# Patient Record
Sex: MALE | Race: BLACK OR AFRICAN AMERICAN | NOT HISPANIC OR LATINO | URBAN - METROPOLITAN AREA
[De-identification: names, ages, dates, MRNs, and addresses within clinical notes are randomized per-mention and may not be internally consistent; named-entity substitution may affect disease eponyms.]

---

## 2019-05-16 ENCOUNTER — INPATIENT (INPATIENT)
Facility: HOSPITAL | Age: 79
LOS: 4 days | Discharge: NURSING, REHAB OR LONG-TERM CARE | End: 2019-05-21
Attending: INTERNAL MEDICINE | Admitting: INTERNAL MEDICINE
Payer: MEDICARE

## 2019-05-16 VITALS
SYSTOLIC BLOOD PRESSURE: 180 MMHG | WEIGHT: 190.04 LBS | HEART RATE: 72 BPM | TEMPERATURE: 97 F | RESPIRATION RATE: 16 BRPM | OXYGEN SATURATION: 100 % | DIASTOLIC BLOOD PRESSURE: 90 MMHG | HEIGHT: 71 IN

## 2019-05-16 DIAGNOSIS — N39.0 URINARY TRACT INFECTION, SITE NOT SPECIFIED: ICD-10-CM

## 2019-05-16 DIAGNOSIS — I63.9 CEREBRAL INFARCTION, UNSPECIFIED: ICD-10-CM

## 2019-05-16 LAB
ALBUMIN SERPL ELPH-MCNC: 3.4 G/DL — SIGNIFICANT CHANGE UP (ref 3.3–5)
ALP SERPL-CCNC: 78 U/L — SIGNIFICANT CHANGE UP (ref 40–120)
ALT FLD-CCNC: 13 U/L — SIGNIFICANT CHANGE UP (ref 12–78)
ANION GAP SERPL CALC-SCNC: 10 MMOL/L — SIGNIFICANT CHANGE UP (ref 5–17)
APPEARANCE UR: ABNORMAL
APTT BLD: 31 SEC — SIGNIFICANT CHANGE UP (ref 28.5–37)
AST SERPL-CCNC: 19 U/L — SIGNIFICANT CHANGE UP (ref 15–37)
BACTERIA # UR AUTO: ABNORMAL
BILIRUB SERPL-MCNC: 0.7 MG/DL — SIGNIFICANT CHANGE UP (ref 0.2–1.2)
BILIRUB UR-MCNC: NEGATIVE — SIGNIFICANT CHANGE UP
BUN SERPL-MCNC: 66 MG/DL — HIGH (ref 7–23)
CALCIUM SERPL-MCNC: 8.2 MG/DL — LOW (ref 8.5–10.1)
CHLORIDE SERPL-SCNC: 116 MMOL/L — HIGH (ref 96–108)
CK MB BLD-MCNC: 2.9 % — SIGNIFICANT CHANGE UP (ref 0–3.5)
CK MB CFR SERPL CALC: 8 NG/ML — HIGH (ref 0.5–3.6)
CK SERPL-CCNC: 272 U/L — SIGNIFICANT CHANGE UP (ref 26–308)
CO2 SERPL-SCNC: 21 MMOL/L — LOW (ref 22–31)
COLOR SPEC: YELLOW — SIGNIFICANT CHANGE UP
CREAT SERPL-MCNC: 5.54 MG/DL — HIGH (ref 0.5–1.3)
DIFF PNL FLD: ABNORMAL
EPI CELLS # UR: NEGATIVE — SIGNIFICANT CHANGE UP
GLUCOSE BLDC GLUCOMTR-MCNC: 90 MG/DL — SIGNIFICANT CHANGE UP (ref 70–99)
GLUCOSE SERPL-MCNC: 102 MG/DL — HIGH (ref 70–99)
GLUCOSE UR QL: NEGATIVE MG/DL — SIGNIFICANT CHANGE UP
HCT VFR BLD CALC: 34.5 % — LOW (ref 39–50)
HGB BLD-MCNC: 10.7 G/DL — LOW (ref 13–17)
INR BLD: 1.28 RATIO — HIGH (ref 0.88–1.16)
KETONES UR-MCNC: NEGATIVE — SIGNIFICANT CHANGE UP
LACTATE SERPL-SCNC: 0.8 MMOL/L — SIGNIFICANT CHANGE UP (ref 0.7–2)
LEUKOCYTE ESTERASE UR-ACNC: ABNORMAL
MCHC RBC-ENTMCNC: 31 GM/DL — LOW (ref 32–36)
MCHC RBC-ENTMCNC: 31.2 PG — SIGNIFICANT CHANGE UP (ref 27–34)
MCV RBC AUTO: 100.6 FL — HIGH (ref 80–100)
NITRITE UR-MCNC: POSITIVE
NRBC # BLD: 0 /100 WBCS — SIGNIFICANT CHANGE UP (ref 0–0)
PH UR: 6 — SIGNIFICANT CHANGE UP (ref 5–8)
PLATELET # BLD AUTO: 140 K/UL — LOW (ref 150–400)
POTASSIUM SERPL-MCNC: 4.7 MMOL/L — SIGNIFICANT CHANGE UP (ref 3.5–5.3)
POTASSIUM SERPL-SCNC: 4.7 MMOL/L — SIGNIFICANT CHANGE UP (ref 3.5–5.3)
PROT SERPL-MCNC: 7.3 GM/DL — SIGNIFICANT CHANGE UP (ref 6–8.3)
PROT UR-MCNC: 500 MG/DL
PROTHROM AB SERPL-ACNC: 14.4 SEC — HIGH (ref 10–12.9)
RBC # BLD: 3.43 M/UL — LOW (ref 4.2–5.8)
RBC # FLD: 14 % — SIGNIFICANT CHANGE UP (ref 10.3–14.5)
RBC CASTS # UR COMP ASSIST: ABNORMAL /HPF (ref 0–4)
SODIUM SERPL-SCNC: 147 MMOL/L — HIGH (ref 135–145)
SP GR SPEC: 1.01 — SIGNIFICANT CHANGE UP (ref 1.01–1.02)
TROPONIN I SERPL-MCNC: 0.12 NG/ML — HIGH (ref 0.01–0.04)
UROBILINOGEN FLD QL: NEGATIVE MG/DL — SIGNIFICANT CHANGE UP
WBC # BLD: 5.31 K/UL — SIGNIFICANT CHANGE UP (ref 3.8–10.5)
WBC # FLD AUTO: 5.31 K/UL — SIGNIFICANT CHANGE UP (ref 3.8–10.5)
WBC UR QL: >50

## 2019-05-16 PROCEDURE — 70450 CT HEAD/BRAIN W/O DYE: CPT | Mod: 26

## 2019-05-16 PROCEDURE — 99291 CRITICAL CARE FIRST HOUR: CPT

## 2019-05-16 PROCEDURE — 93010 ELECTROCARDIOGRAM REPORT: CPT

## 2019-05-16 PROCEDURE — 71045 X-RAY EXAM CHEST 1 VIEW: CPT | Mod: 26

## 2019-05-16 PROCEDURE — 70544 MR ANGIOGRAPHY HEAD W/O DYE: CPT | Mod: 26,59

## 2019-05-16 PROCEDURE — 70547 MR ANGIOGRAPHY NECK W/O DYE: CPT | Mod: 26

## 2019-05-16 PROCEDURE — 70551 MRI BRAIN STEM W/O DYE: CPT | Mod: 26

## 2019-05-16 RX ORDER — CHLORHEXIDINE GLUCONATE 213 G/1000ML
1 SOLUTION TOPICAL
Refills: 0 | Status: DISCONTINUED | OUTPATIENT
Start: 2019-05-16 | End: 2019-05-17

## 2019-05-16 RX ORDER — NICARDIPINE HYDROCHLORIDE 30 MG/1
5 CAPSULE, EXTENDED RELEASE ORAL
Qty: 40 | Refills: 0 | Status: DISCONTINUED | OUTPATIENT
Start: 2019-05-16 | End: 2019-05-17

## 2019-05-16 RX ORDER — ALTEPLASE 100 MG
8 KIT INTRAVENOUS ONCE
Refills: 0 | Status: COMPLETED | OUTPATIENT
Start: 2019-05-16 | End: 2019-05-16

## 2019-05-16 RX ORDER — CEFTRIAXONE 500 MG/1
1 INJECTION, POWDER, FOR SOLUTION INTRAMUSCULAR; INTRAVENOUS ONCE
Refills: 0 | Status: COMPLETED | OUTPATIENT
Start: 2019-05-16 | End: 2019-05-16

## 2019-05-16 RX ORDER — ALTEPLASE 100 MG
70 KIT INTRAVENOUS ONCE
Refills: 0 | Status: COMPLETED | OUTPATIENT
Start: 2019-05-16 | End: 2019-05-16

## 2019-05-16 RX ORDER — CEFTRIAXONE 500 MG/1
1 INJECTION, POWDER, FOR SOLUTION INTRAMUSCULAR; INTRAVENOUS EVERY 24 HOURS
Refills: 0 | Status: DISCONTINUED | OUTPATIENT
Start: 2019-05-17 | End: 2019-05-21

## 2019-05-16 RX ADMIN — ALTEPLASE 480 MILLIGRAM(S): KIT at 13:58

## 2019-05-16 RX ADMIN — ALTEPLASE 70 MILLIGRAM(S): KIT at 13:58

## 2019-05-16 RX ADMIN — ALTEPLASE 70 MILLIGRAM(S): KIT at 15:15

## 2019-05-16 RX ADMIN — ALTEPLASE 8 MILLIGRAM(S): KIT at 14:24

## 2019-05-16 RX ADMIN — CHLORHEXIDINE GLUCONATE 1 APPLICATION(S): 213 SOLUTION TOPICAL at 19:30

## 2019-05-16 RX ADMIN — NICARDIPINE HYDROCHLORIDE 25 MG/HR: 30 CAPSULE, EXTENDED RELEASE ORAL at 13:39

## 2019-05-16 RX ADMIN — CEFTRIAXONE 100 GRAM(S): 500 INJECTION, POWDER, FOR SOLUTION INTRAMUSCULAR; INTRAVENOUS at 18:23

## 2019-05-16 NOTE — ED ADULT NURSE REASSESSMENT NOTE - NS ED NURSE REASSESS COMMENT FT1
tele stroke completed, as per md wylie, and neurologist, pt BP to be 18/110 before TPA can begin. Cardene drip to be initiated, awaiting pharmacy verfication.

## 2019-05-16 NOTE — ED PROVIDER NOTE - CRITICAL CARE PROVIDED
direct patient care (not related to procedure)/consult w/ pt's family directly relating to pts condition/additional history taking/documentation

## 2019-05-16 NOTE — H&P ADULT - NSHPLABSRESULTS_GEN_ALL_CORE
CBC Full  -  ( 16 May 2019 13:16 )  WBC Count : 5.31 K/uL  RBC Count : 3.43 M/uL  Hemoglobin : 10.7 g/dL  Hematocrit : 34.5 %  Platelet Count - Automated : 140 K/uL  Mean Cell Volume : 100.6 fl  Mean Cell Hemoglobin : 31.2 pg  Mean Cell Hemoglobin Concentration : 31.0 gm/dL  Auto Neutrophil # : x  Auto Lymphocyte # : x  Auto Monocyte # : x  Auto Eosinophil # : x  Auto Basophil # : x  Auto Neutrophil % : x  Auto Lymphocyte % : x  Auto Monocyte % : x  Auto Eosinophil % : x  Auto Basophil % : x          147<H>  |  116<H>  |  66<H>  ----------------------------<  102<H>  4.7   |  21<L>  |  5.54<H>    Ca    8.2<L>      16 May 2019 13:16    TPro  7.3  /  Alb  3.4  /  TBili  0.7  /  DBili  x   /  AST  19  /  ALT  13  /  AlkPhos  78  05-16    LIVER FUNCTIONS - ( 16 May 2019 13:16 )  Alb: 3.4 g/dL / Pro: 7.3 gm/dL / ALK PHOS: 78 U/L / ALT: 13 U/L / AST: 19 U/L / GGT: x           CAPILLARY BLOOD GLUCOSE  90 (16 May 2019 12:53)      POCT Blood Glucose.: 90 mg/dL (16 May 2019 12:41)    PT/INR - ( 16 May 2019 13:16 )   PT: 14.4 sec;   INR: 1.28 ratio    PTT - ( 16 May 2019 13:16 )  PTT:31.0 sec  Urinalysis Basic - ( 16 May 2019 15:42 )    Color: Yellow / Appearance: Slightly Turbid / S.010 / pH: x  Gluc: x / Ketone: Negative  / Bili: Negative / Urobili: Negative mg/dL   Blood: x / Protein: 500 mg/dL / Nitrite: Positive   Leuk Esterase: Moderate / RBC: 3-5 /HPF / WBC >50   Sq Epi: x / Non Sq Epi: Negative / Bacteria: Few    < from: CT Head No Cont (19 @ 13:01) >    IMPRESSION:  Age-indeterminate infarct within the left parieto-occipital lobe. Dense   cortical vessel anteriorly which may be thrombosed. Further evaluation   with MRI is recommended.  No intracranial hemorrhage, mass, or midline shift.  < end of copied text >  < from: MR Angio Head No Cont (19 @ 16:56) >  IMPRESSION: Moderate stenosis involving the right PCA P3 segment.   Moderate stenosis involving the distal left vertebral artery intracranial   V4 segment. No large vessel occlusion.  < end of copied text >  < from: MR Head No Cont (19 @ 16:36) >    IMPRESSION:    Small completed subacute left-sided infarctas above. Trace petechial   hemorrhage versus developing laminar necrosis    < end of copied text >

## 2019-05-16 NOTE — ED ADULT NURSE NOTE - CHIEF COMPLAINT QUOTE
Per EMS pt was in [employer] with spouse and stated not feeling well about 12 felt weak brought down to floor no head injury, since then pt is aphasic and not following commands

## 2019-05-16 NOTE — ED PROVIDER NOTE - OBJECTIVE STATEMENT
79 year old male presents today [facility] from [employer] accompanied with his wife who reports that he was in his normal state of health until 12pm, pt only had complaints of fatigue the last two days, while at [employer] pt was hold some walker when his wife noticed he did not appear to be himself, he was staring and not responding to questions, he then appeared to become very weak and assisted to the floor, EMS found him hypertensive and in atrial fibrillation

## 2019-05-16 NOTE — H&P ADULT - PROBLEM SELECTOR PLAN 1
admit to icu as d/w intensivist, neuro checks q1 hour per protocol,   -follow up CT after 24 hours or if neurological deterioration STAT CT,  - MRI/MRA  done  check lipid panel - start statin,   -check A1C, PT, OT, Speech and swallow evaluation,   - start dvt prophylaxis after 24 hours if no bleed, start aspirin if no bleed, 2decho r/o thrombus, carotid doppler r/o stenosis,   Neurology consult ed Dr. Patricio to see patient

## 2019-05-16 NOTE — ED PROVIDER NOTE - CARE PLAN
Principal Discharge DX:	Aphasia Principal Discharge DX:	Aphasia  Secondary Diagnosis:	CVA (cerebral vascular accident)

## 2019-05-16 NOTE — H&P ADULT - HISTORY OF PRESENT ILLNESS
79 years old man was reported to be last known well and around NOON when he was shopping with his wife at [facility]. He was reported to have a normal conversation with his wife at that time. Soon after that, he was reported to have language disturbance in the form of inability to answer any questions or follow any commands. He was brought to ER  further evaluation. Family denies that he had  any episodes of focal neurological symptoms in the past even transient.

## 2019-05-16 NOTE — STROKE CODE NOTE - ASSESSMENT/PLAN
79 years old man with vascular risk factors of age and HTN is evaluated at OSH for an acute onset of language disturbance. He was reported to be last known well and around NOON when he had a normal conversation with his wife. Soon after that, he was noted to have language disturbance in the form of aphasia, prompting his presentation to OSH. Examination through telestroke shows severe mixed aphasia (expressive>receptive), inability to follow some simple commands respecting the midline and disorientation to his age/months. CT brain to my eye showed age indeterminate left MCA distribution infarct (chronic to my eye). Of note he was diagnosed with atrial fibrillation upon presentation to OSH.    Impression:  Cerebral embolism with cerebral infarction. Probable left MCA distribution stroke - likely etiology being cardioembolism in the setting of newly diagnosed atrial fibrillation but possibility of large vessel disease needs to be ruled out

## 2019-05-16 NOTE — STROKE CODE NOTE - NSICDXPROBLEM_GEN_ALL_CORE_FT
Plan:   - Risks, benefits and adverse reactions associated with IV tPA including hemorrhagic stroke were discussed with patient and available family member in detail. After ruling out contraindications and obtaining verbal consent, patient would be treated with IV tPA in the ED  - Cont with IV tPA precautions including BP goal<185/110 mmHg before the bolus and <180/105 mmHg for first 24 hours after bolus followed by gradual normotension  - CTA head and neck to evaluate for candidacy for mechanical embolectomy   - Aspirin and pharmacological DVT prophylaxis to be considered 24 hours after the IV tPA and repeat brain imaging. SCDs for DVT prophylaxis in the interim   - Atorvastatin 80 mg after establishing enteral access or passing swallow evaluation  - TTE with bubble study and telemetry to screen for possible cardiac source of embolism  - LDL and HbA1C, continue with aggressive vascular risk factors modifications   - PT/OT/Speech and swallow evaluation   - MRI brain without contrast     Above mentioned plan was discussed with patient, available family member and [facility] ED MD in detail. All the questions were answered and concerns were addressed.

## 2019-05-16 NOTE — ED ADULT NURSE NOTE - OBJECTIVE STATEMENT
Per [facility] EMS pt was in [employer] with spouse. Sudden onset weakness, pt was eased onto the florr, pt aphasic as per wife unable to speak. pt pmh HTN, no pcp or home medications. Last seen normal 12:00.

## 2019-05-16 NOTE — H&P ADULT - NSHPPHYSICALEXAM_GEN_ALL_CORE
PHYSICAL EXAM:  GENERAL: NAD, well-developed, well nourished  HEAD:  NC/AC  EYES: EOMI, PERRLA, conjunctiva and sclera clear  NECK: Supple, No JVD  CHEST/LUNG: CTAB. No cough, wheeze, rales.   HEART: Regular rate and rhythm. No murmurs, rubs, or gallops.   ABDOMEN: Soft, Nontender, Nondistended. Bowel sounds present  EXTREMITIES:  2+ Peripheral Pulses, No clubbing, cyanosis, or edema  MS: awake and alert, oriented to self and time, with expressive difficulty  NEUROLOGY: non-focal

## 2019-05-16 NOTE — H&P ADULT - ATTENDING COMMENTS
I have seen and examined the patient. I agree with the above history, physical exam, and plan of care except for as detailed below.    78 y/o M admitted for acute ischemic stroke received tPA in ED.    - Admit to ICU for post tPA monitoring  - Appreciate neuro recs  - q1hr neuro checks  - Repeat head CT at 24 hrs or sooner if change in neuro exam    Attending critical care time 35 minutes

## 2019-05-16 NOTE — ED PROVIDER NOTE - PROGRESS NOTE DETAILS
joe called, dr anna informed teleneurology called, dr álvarez is aware of the patient, no change in patients mental status pt was evaluated by dr álvarez, tpa ordered, oral consent given by the family after risks and benefits were told by dr álvarez, pt currently 213/90, cardene ordered icu called for the admission pt is speaking

## 2019-05-16 NOTE — H&P ADULT - ASSESSMENT
78 y/o male admitted with cva sp tpa ,with aphasia and uti, 78 y/o male admitted with cva sp tpa ,with aphasia and uti,   admit to critical care

## 2019-05-16 NOTE — STROKE CODE NOTE - SUBJECTIVE
79 years old man was reported to be last known well and around NOON when he was shopping with his wife at [facility]. He was reported to have a normal conversation with his wife at that time. Soon after that, he was reported to have language disturbance in the form of inability to answer any questions or follow any commands. He was brought to OSH for further evaluation. Of note, he is not reported to have any episodes of focal neurological symptoms in the past even transiently.

## 2019-05-16 NOTE — ED ADULT NURSE NOTE - NSIMPLEMENTINTERV_GEN_ALL_ED
Implemented All Fall with Harm Risk Interventions:  [place] to call system. Call bell, personal items and telephone within reach. Instruct patient to call for assistance. Room bathroom lighting operational. Non-slip footwear when patient is off stretcher. Physically safe environment: no spills, clutter or unnecessary equipment. Stretcher in lowest position, wheels locked, appropriate side rails in place. Provide visual cue, wrist band, yellow gown, etc. Monitor gait and stability. Monitor for mental status changes and reorient to person, place, and time. Review medications for side effects contributing to fall risk. Reinforce activity limits and safety measures with patient and family. Provide visual clues: red socks.

## 2019-05-17 LAB
ANION GAP SERPL CALC-SCNC: 10 MMOL/L — SIGNIFICANT CHANGE UP (ref 5–17)
APTT BLD: 29.6 SEC — SIGNIFICANT CHANGE UP (ref 28.5–37)
BUN SERPL-MCNC: 64 MG/DL — HIGH (ref 7–23)
CALCIUM SERPL-MCNC: 8.7 MG/DL — SIGNIFICANT CHANGE UP (ref 8.5–10.1)
CHLORIDE SERPL-SCNC: 115 MMOL/L — HIGH (ref 96–108)
CHOLEST SERPL-MCNC: 146 MG/DL — SIGNIFICANT CHANGE UP (ref 10–199)
CK MB BLD-MCNC: 2.2 % — SIGNIFICANT CHANGE UP (ref 0–3.5)
CK MB CFR SERPL CALC: 7.9 NG/ML — HIGH (ref 0.5–3.6)
CK SERPL-CCNC: 364 U/L — HIGH (ref 26–308)
CO2 SERPL-SCNC: 20 MMOL/L — LOW (ref 22–31)
CREAT SERPL-MCNC: 5.04 MG/DL — HIGH (ref 0.5–1.3)
CULTURE RESULTS: SIGNIFICANT CHANGE UP
GLUCOSE SERPL-MCNC: 85 MG/DL — SIGNIFICANT CHANGE UP (ref 70–99)
HBA1C BLD-MCNC: 5 % — SIGNIFICANT CHANGE UP (ref 4–5.6)
HCT VFR BLD CALC: 34.2 % — LOW (ref 39–50)
HDLC SERPL-MCNC: 47 MG/DL — SIGNIFICANT CHANGE UP
HGB BLD-MCNC: 10.5 G/DL — LOW (ref 13–17)
INR BLD: 1.31 RATIO — HIGH (ref 0.88–1.16)
LIPID PNL WITH DIRECT LDL SERPL: 91 MG/DL — SIGNIFICANT CHANGE UP
MAGNESIUM SERPL-MCNC: 2.5 MG/DL — SIGNIFICANT CHANGE UP (ref 1.6–2.6)
MCHC RBC-ENTMCNC: 30.7 GM/DL — LOW (ref 32–36)
MCHC RBC-ENTMCNC: 30.8 PG — SIGNIFICANT CHANGE UP (ref 27–34)
MCV RBC AUTO: 100.3 FL — HIGH (ref 80–100)
NRBC # BLD: 0 /100 WBCS — SIGNIFICANT CHANGE UP (ref 0–0)
PHOSPHATE SERPL-MCNC: 4.6 MG/DL — HIGH (ref 2.5–4.5)
PLATELET # BLD AUTO: 138 K/UL — LOW (ref 150–400)
POTASSIUM SERPL-MCNC: 4.3 MMOL/L — SIGNIFICANT CHANGE UP (ref 3.5–5.3)
POTASSIUM SERPL-SCNC: 4.3 MMOL/L — SIGNIFICANT CHANGE UP (ref 3.5–5.3)
PROTHROM AB SERPL-ACNC: 14.8 SEC — HIGH (ref 10–12.9)
RBC # BLD: 3.41 M/UL — LOW (ref 4.2–5.8)
RBC # FLD: 14 % — SIGNIFICANT CHANGE UP (ref 10.3–14.5)
SODIUM SERPL-SCNC: 145 MMOL/L — SIGNIFICANT CHANGE UP (ref 135–145)
SPECIMEN SOURCE: SIGNIFICANT CHANGE UP
TOTAL CHOLESTEROL/HDL RATIO MEASUREMENT: 3.1 RATIO — LOW (ref 3.4–9.6)
TRIGL SERPL-MCNC: 41 MG/DL — SIGNIFICANT CHANGE UP (ref 10–149)
TROPONIN I SERPL-MCNC: 0.17 NG/ML — HIGH (ref 0.01–0.04)
WBC # BLD: 5.38 K/UL — SIGNIFICANT CHANGE UP (ref 3.8–10.5)
WBC # FLD AUTO: 5.38 K/UL — SIGNIFICANT CHANGE UP (ref 3.8–10.5)

## 2019-05-17 PROCEDURE — 70450 CT HEAD/BRAIN W/O DYE: CPT | Mod: 26

## 2019-05-17 PROCEDURE — 93306 TTE W/DOPPLER COMPLETE: CPT | Mod: 26

## 2019-05-17 PROCEDURE — 99233 SBSQ HOSP IP/OBS HIGH 50: CPT

## 2019-05-17 PROCEDURE — 76770 US EXAM ABDO BACK WALL COMP: CPT | Mod: 26

## 2019-05-17 RX ORDER — ASPIRIN/CALCIUM CARB/MAGNESIUM 324 MG
325 TABLET ORAL DAILY
Refills: 0 | Status: DISCONTINUED | OUTPATIENT
Start: 2019-05-17 | End: 2019-05-21

## 2019-05-17 RX ORDER — METOPROLOL TARTRATE 50 MG
12.5 TABLET ORAL
Refills: 0 | Status: DISCONTINUED | OUTPATIENT
Start: 2019-05-17 | End: 2019-05-18

## 2019-05-17 RX ORDER — SIMVASTATIN 20 MG/1
40 TABLET, FILM COATED ORAL AT BEDTIME
Refills: 0 | Status: DISCONTINUED | OUTPATIENT
Start: 2019-05-17 | End: 2019-05-17

## 2019-05-17 RX ORDER — AMLODIPINE BESYLATE 2.5 MG/1
10 TABLET ORAL DAILY
Refills: 0 | Status: DISCONTINUED | OUTPATIENT
Start: 2019-05-17 | End: 2019-05-21

## 2019-05-17 RX ORDER — HEPARIN SODIUM 5000 [USP'U]/ML
5000 INJECTION INTRAVENOUS; SUBCUTANEOUS EVERY 12 HOURS
Refills: 0 | Status: DISCONTINUED | OUTPATIENT
Start: 2019-05-17 | End: 2019-05-21

## 2019-05-17 RX ORDER — HYDRALAZINE HCL 50 MG
10 TABLET ORAL EVERY 6 HOURS
Refills: 0 | Status: DISCONTINUED | OUTPATIENT
Start: 2019-05-17 | End: 2019-05-21

## 2019-05-17 RX ORDER — SIMVASTATIN 20 MG/1
20 TABLET, FILM COATED ORAL AT BEDTIME
Refills: 0 | Status: DISCONTINUED | OUTPATIENT
Start: 2019-05-17 | End: 2019-05-21

## 2019-05-17 RX ORDER — METOPROLOL TARTRATE 50 MG
5 TABLET ORAL ONCE
Refills: 0 | Status: COMPLETED | OUTPATIENT
Start: 2019-05-17 | End: 2019-05-17

## 2019-05-17 RX ADMIN — Medication 12.5 MILLIGRAM(S): at 20:05

## 2019-05-17 RX ADMIN — CHLORHEXIDINE GLUCONATE 1 APPLICATION(S): 213 SOLUTION TOPICAL at 11:35

## 2019-05-17 RX ADMIN — SIMVASTATIN 20 MILLIGRAM(S): 20 TABLET, FILM COATED ORAL at 21:56

## 2019-05-17 RX ADMIN — AMLODIPINE BESYLATE 10 MILLIGRAM(S): 2.5 TABLET ORAL at 11:35

## 2019-05-17 RX ADMIN — Medication 10 MILLIGRAM(S): at 16:33

## 2019-05-17 RX ADMIN — Medication 5 MILLIGRAM(S): at 15:09

## 2019-05-17 RX ADMIN — HEPARIN SODIUM 5000 UNIT(S): 5000 INJECTION INTRAVENOUS; SUBCUTANEOUS at 18:48

## 2019-05-17 RX ADMIN — CEFTRIAXONE 100 GRAM(S): 500 INJECTION, POWDER, FOR SOLUTION INTRAMUSCULAR; INTRAVENOUS at 18:48

## 2019-05-17 RX ADMIN — Medication 325 MILLIGRAM(S): at 16:33

## 2019-05-17 RX ADMIN — Medication 10 MILLIGRAM(S): at 09:09

## 2019-05-17 NOTE — OCCUPATIONAL THERAPY INITIAL EVALUATION ADULT - NS ASR FOLLOW COMMAND OT EVAL
50% of the time/able to follow single-step instructions/Object identification 7/13 accuracy./unable to follow multi-step instructions

## 2019-05-17 NOTE — OCCUPATIONAL THERAPY INITIAL EVALUATION ADULT - GENERAL OBSERVATIONS, REHAB EVAL
Supine in bed, NAD. Patient's daughter and spouse at bed side. Pt noted with expressive aphasia, decreased attention, decreased processing & decreased sequencing.

## 2019-05-17 NOTE — DIETITIAN INITIAL EVALUATION ADULT. - PERTINENT LABORATORY DATA
05-17 Na 145   Glu 85   K+ 4.3   Cr 5.04   BUN 64   Phos 4.6   Alb 3.4   PAB n/a   Hgb 10.5   Hct 34..2   HgA1C 5.0 %  Glucose, Serum: 85 mg/dL, 05-17 Chol 146 LDL 91 HDL 47 Trig 41

## 2019-05-17 NOTE — PROGRESS NOTE ADULT - ASSESSMENT
Subjective Complaints:  Historian:             Vital Signs Last 24 Hrs  T(C): 36.6 (17 May 2019 11:31), Max: 36.6 (17 May 2019 08:10)  T(F): 97.8 (17 May 2019 11:31), Max: 97.9 (17 May 2019 08:10)  HR: 64 (17 May 2019 15:03) (56 - 95)  BP: 188/106 (17 May 2019 15:03) (154/84 - 218/108)  BP(mean): 127 (17 May 2019 15:03) (102 - 135)  RR: 18 (17 May 2019 15:03) (13 - 26)  SpO2: 99% (17 May 2019 15:03) (95% - 100%)    GENERAL PHYSICAL EXAM:  General:  Appears stated age, well-groomed, well-nourished, no distress  HEENT:  NC/AT, patent nares w/ pink mucosa, OP clear w/o lesions, PERRL, EOMI, conjunctivae clear, no thyromegaly, nodules, adenopathy, no JVD  Chest:  Full & symmetric excursion, no increased effort, breath sounds clear  Cardiovascular:  Regular rhythm, S1, S2, no murmur/rub/S3/S4, no carotid/femoral/abdominal bruit, radial/pedal pulses 2+, no edema  Abdomen:  Soft, non-tender, non-distended, normoactive bowel sounds, no HSM  Extremities:  Gait & station:   Digits:   Nails:   Joints, Bones, Muscles:   ROM:   Stability:  Skin:  No rash/erythema/ecchymoses/petechiae/wounds/abscess/warm/dry  Musculoskeletal:  Full ROM in all joints w/o swelling/tenderness/effusion        LABS:                        10.5   5.38  )-----------( 138      ( 17 May 2019 03:21 )             34.2     05-17    145  |  115<H>  |  64<H>  ----------------------------<  85  4.3   |  20<L>  |  5.04<H>    Ca    8.7      17 May 2019 03:21  Phos  4.6     05-  Mg     2.5     05-    TPro  7.3  /  Alb  3.4  /  TBili  0.7  /  DBili  x   /  AST  19  /  ALT  13  /  AlkPhos  78  05-16    PT/INR - ( 17 May 2019 03:21 )   PT: 14.8 sec;   INR: 1.31 ratio         PTT - ( 17 May 2019 03:21 )  PTT:29.6 sec  Urinalysis Basic - ( 16 May 2019 15:42 )    Color: Yellow / Appearance: Slightly Turbid / S.010 / pH: x  Gluc: x / Ketone: Negative  / Bili: Negative / Urobili: Negative mg/dL   Blood: x / Protein: 500 mg/dL / Nitrite: Positive   Leuk Esterase: Moderate / RBC: 3-5 /HPF / WBC >50   Sq Epi: x / Non Sq Epi: Negative / Bacteria: Few        RADIOLOGY & ADDITIONAL STUDIES:        Neurology Progress Note:      Mental Status: awaek alert speech fouent  follws  commands       Cranial Nerves: 2 .12 intact      Motor:   arm leg 4/5         Sensory: intact      Cerebellar:  finger to nose intact      Gait: deefgrd       Assesment/Plan: s/p tpa left cva mri armando noted lef tpost parietal infarct no bleeed  for pt will follow

## 2019-05-17 NOTE — PROGRESS NOTE ADULT - ASSESSMENT
Pt is an 80 yo M with h/o HTN who had an acute language disturbance in the form of inability to answer any questions or follow any commands. Pt was brought to ER  and CT Age-indeterminate infarct within the left parieto-occipital lobe. Pt Rx'd with TPA admitted to the ICU. Pt worked up for elevated Cr with renal US and incidentally found to Enlarged, polycystic kidneys. There is a 9.1 cm solid and cystic  in the lower pole of the right kidney concerning for neoplasm   Resp: Supplemental O2 prn  ID: U/A with >50 WBC; Ceftriaxone  CVS: BP control  Heme: May start Asa today/ If A fib persists pt should be AC if there are no contraindications  FEN: Po diet  Renal: Renal evaluation of polycystic kidneys and R kidney abnormality  Neuro: F/u as per Neuro/ Today's repeat CT head noted  Social: May transfer out of ICU

## 2019-05-17 NOTE — PHYSICAL THERAPY INITIAL EVALUATION ADULT - ADDITIONAL COMMENTS
As per care coordination and corroborated with patient and patient's wife at bedside, patient lives in a private house with 4 steps to enter +rail, no steps once inside. Patient owns a rolling walker but does not use it.

## 2019-05-17 NOTE — DIETITIAN INITIAL EVALUATION ADULT. - EST PROTEIN NEEDS2
Ok to refill the topical med the pt wants x 2    Electronically Signed by: Krishna Santamaria MD, 4/22/2019   58.5

## 2019-05-17 NOTE — PHYSICAL THERAPY INITIAL EVALUATION ADULT - STRENGTHENING, PT EVAL
Patient will improve strength in B LE to 5/5 6-8 weeks to improve overall functional mobility including gait, transfers, bed mobility and decrease risk of falls.

## 2019-05-17 NOTE — PHYSICAL THERAPY INITIAL EVALUATION ADULT - FOLLOWS COMMANDS/ANSWERS QUESTIONS, REHAB EVAL
100% of the time/able to follow single-step instructions able to follow single-step instructions/50% of the time

## 2019-05-17 NOTE — SWALLOW BEDSIDE ASSESSMENT ADULT - H & P REVIEW
yes/79 years old man was reported to be last known well and around NOON when he was shopping with his wife at [facility]. He was reported to have a normal conversation with his wife at that time. Soon after that, he was reported to have language disturbance in the form of inability to answer any questions or follow any commands. He was brought to ER  further evaluation. Family denies that he had  any episodes of focal neurological symptoms in the past even transient.

## 2019-05-17 NOTE — SWALLOW BEDSIDE ASSESSMENT ADULT - COMMENTS
MRI head 5/16/2019 IMPRESSION:Small completed subacute left-sided infarct as above. Trace petechial hemorrhage versus developing laminar necrosis    CXR 5/16/2019 IMPRESSION:Cardiomegaly and small bibasilar atelectasis.

## 2019-05-17 NOTE — CONSULT NOTE ADULT - ASSESSMENT
Subjective Complaints:      Consult requested by ER doctor:                  Attending:     History of Present Illness:  Chief Complaint/Reason for Admission:  History of Present Illness:  HPI:  79 years old man was reported to be last known well and around NOON when he was shopping with his wife at [employer]. He was reported to have a normal conversation with his wife at that time. Soon after that, he was reported to have language disturbance in the form of inability to answer any questions or follow any commands. He was brought to ER  further evaluation. Family denies that he had  any episodes of focal neurological symptoms in the past even transient. (16 May 2019 18:57)        PAST MEDICAL & SURGICAL HISTORY:  Hypertension  No significant past surgical history  79yMale    MEDICATIONS  (STANDING):  cefTRIAXone   IVPB 1 Gram(s) IV Intermittent every 24 hours  chlorhexidine 4% Liquid 1 Application(s) Topical <User Schedule>  niCARdipine Infusion 5 mG/Hr (25 mL/Hr) IV Continuous <Continuous>    MEDICATIONS  (PRN):      Allergies    No Known Allergies    Intolerances      FAMILY HISTORY:      REVIEW OF SYSTEMS:  General:  No wt loss, fevers, chills, night sweats  Eyes:  Good vision, no reported pain  ENT:  No sore throat, pain, runny nose, dysphagia  CV:  No pain, palpitatioins, hypo/hypertension  Resp:  No dyspnea, cough, tachypnea, wheezing  GI:  No pain, nausea, vomiting, diarrhea, constipatiion  :  No pain, bleeding, incontinence, nocturia  Muscle:  No pain, weakness  Breast:  No pain, abscess, mass, discharge  Neuro:  No weakness, tingling, memory problems  Psych:  No fatigue, insomnia, mood problems, depression  Endocrine:  No polyuria, polydypsia, cold/heat intolerance  Heme:  No petechiae, ecchymosis, easy bruisability  Skin:  No rash, tattoos, scars, edema      Vital Signs Last 24 Hrs  T(C): 36.4 (16 May 2019 23:30), Max: 36.4 (16 May 2019 23:30)  T(F): 97.6 (16 May 2019 23:30), Max: 97.6 (16 May 2019 23:30)  HR: 77 (16 May 2019 23:30) (56 - 95)  BP: 162/109 (16 May 2019 23:30) (137/74 - 218/108)  BP(mean): 127 (16 May 2019 23:30) (102 - 128)  RR: 24 (16 May 2019 23:30) (13 - 24)  SpO2: 100% (16 May 2019 23:30) (97% - 100%)    GENERAL PHYSICAL EXAM:  General:  Appears stated age, well-groomed, well-nourished, no distress  HEENT:  NC/AT, patent nares w/ pink mucosa, OP clear w/o lesions, PERRL, EOMI, conjunctivae clear, no thyromegaly, nodules, adenopathy, no JVD  Chest:  Full & symmetric excursion, no increased effort, breath sounds clear  Cardiovascular:  Regular rhythm, S1, S2, no murmur/rub/S3/S4, no carotid/femoral/abdominal bruit, radial/pedal pulses 2+, no edema  Abdomen:  Soft, non-tender, non-distended, normoactive bowel sounds, no HSM  Extremities:  Gait & station:   Digits:   Nails:   Joints, Bones, Muscles:   ROM:   Stability:  Skin:  No rash/erythema/ecchymoses/petechiae/wounds/abscess/warm/dry  Musculoskeletal:  Full ROM in all joints w/o swelling/tenderness/effusion    NEUROLOGICAL EXAM:  HENT:  Normocephalic head; atraumatic head.  Neck supple.  ENT: normal looking.  Mental State:    Alert.  Fully oriented to person, place and date.  Coherent.  Speech clear and intact.  Cooperative.  Responds appropriately.    Cranial Nerves:  II-XII:   Pupils round and reactive to light and accommodation.  Extraocular movements full.  Visual fields full (no homonymous hemianopsia).  Visual acuity wnl.  Facial symmetry intact.  Tongue midline.  Motor Functions:  Moves all extremities.  No pronator drift of UE.  Claps hand well.  Hand  intact bilaterally.  Ambulatory.    Sensory Functions:   Intact to touch and pinprick to face and extremities.    Reflexes:  Deep tendon reflexes normoactive to biceps, knees and ankles.  Babinski absent (present).  Cerebellar Testing:    Finger to nose intact.  Nystagmus absent.  Neurovascular: Carotid auscultation full without bruits.      LABS:                        10.7   5.31  )-----------( 140      ( 16 May 2019 13:16 )             34.5     05-16    147<H>  |  116<H>  |  66<H>  ----------------------------<  102<H>  4.7   |  21<L>  |  5.54<H>    Ca    8.2<L>      16 May 2019 13:16    TPro  7.3  /  Alb  3.4  /  TBili  0.7  /  DBili  x   /  AST  19  /  ALT  13  /  AlkPhos  78  05-16    PT/INR - ( 16 May 2019 13:16 )   PT: 14.4 sec;   INR: 1.28 ratio         PTT - ( 16 May 2019 13:16 )  PTT:31.0 sec    Urinalysis Basic - ( 16 May 2019 15:42 )    Color: Yellow / Appearance: Slightly Turbid / S.010 / pH: x  Gluc: x / Ketone: Negative  / Bili: Negative / Urobili: Negative mg/dL   Blood: x / Protein: 500 mg/dL / Nitrite: Positive   Leuk Esterase: Moderate / RBC: 3-5 /HPF / WBC >50   Sq Epi: x / Non Sq Epi: Negative / Bacteria: Few        RADIOLOGY & ADDITIONAL STUDIES:      Assessment & Opinion: events noted  hx of htn had aphasia  while shopping ct head no acute path arm leg 4/5 sensory intact  tpa given pt improved  after tpa  mri armando noted left pos t  parietal acute infarct  will follow  left cva after tpa     Recommendations:  Brain MRI.  Carotid doppler.  Echocardiogram.  EEG.   DVT prophylaxis as ordered.  Medications:

## 2019-05-17 NOTE — DIETITIAN INITIAL EVALUATION ADULT. - OTHER INFO
Pt seen for ICU admission. Pt lives with wife; pt's wife does cooking & food shopping. Pt with s/p CVA s/p tpa & swallow evaluation 5/17 recommends Regular/thin liquids when medically appropriate. Noted decreased renal function; Spoke with Nephrologist & recommends renal/protein restrictions.  UBW not available. Last BM ?.

## 2019-05-17 NOTE — PHYSICAL THERAPY INITIAL EVALUATION ADULT - PERTINENT HX OF CURRENT PROBLEM, REHAB EVAL
Patient admitted with having normal conversation with wife at [employer] and then language disturbance noted, unable to follow commands or answer questions. CT head shows infarct and swelling in left posterior MCA

## 2019-05-17 NOTE — SWALLOW BEDSIDE ASSESSMENT ADULT - SLP GENERAL OBSERVATIONS
pt seen bedside alert and oriented x4. pt responded to simple autobiographical/orientation questions with good accuracy and verbalized needs however noted effortful, halting, dysfluent conversation ?nonfluent aphasia brocas vs transcortical motor. pt's speech intelligibility WNL, he was able to follow one step directions and noted good eye contact with SLP pt seen bedside alert and oriented x4. pt responded to simple autobiographical/orientation questions with good accuracy and verbalized needs however noted effortful, halting, dysfluent conversation- ?nonfluent aphasia broca vs transcortical motor. pt's speech intelligibility WNL, he was able to follow one step directions and noted good eye contact with SLP

## 2019-05-17 NOTE — OCCUPATIONAL THERAPY INITIAL EVALUATION ADULT - ADDITIONAL COMMENTS
As per patient's wife and daughter at bedside, patient lives in a private house with 4 steps to enter with hand rail. Patient resides on main level. Pt was independent with functional mobility and basic ADLs prior to admission. Patient owns a rolling walker but does not use it.

## 2019-05-17 NOTE — OCCUPATIONAL THERAPY INITIAL EVALUATION ADULT - PLANNED THERAPY INTERVENTIONS, OT EVAL
balance training/cognitive, visual perceptual/neuromuscular re-education/ADL retraining/strengthening/fine motor coordination training/transfer training/stretching

## 2019-05-17 NOTE — CONSULT NOTE ADULT - SUBJECTIVE AND OBJECTIVE BOX
Information from chart:  "Patient is a 79y old  Male who presents with a chief complaint of cva sp tpa (17 May 2019 00:19)    HPI:  79 years old man was reported to be last known well and around NOON when he was shopping with his wife at [employer]. He was reported to have a normal conversation with his wife at that time. Soon after that, he was reported to have language disturbance in the form of inability to answer any questions or follow any commands. He was brought to ER  further evaluation. Family denies that he had  any episodes of focal neurological symptoms in the past even transient. (16 May 2019 18:57)   "    Patient is post TPA;   Noted Cr 5.5 on admission; US with large polycystic kidneys,   Patient poor historian, no hx of renal disease; no hx of renal disease in child or family;  Noted possible renal mass;       PAST MEDICAL & SURGICAL HISTORY:  Hypertension  No significant past surgical history    FAMILY HISTORY:    Allergies    No Known Allergies    Intolerances      Home Medications:    MEDICATIONS  (STANDING):  amLODIPine   Tablet 10 milliGRAM(s) Oral daily  cefTRIAXone   IVPB 1 Gram(s) IV Intermittent every 24 hours  chlorhexidine 4% Liquid 1 Application(s) Topical <User Schedule>    MEDICATIONS  (PRN):  hydrALAZINE Injectable 10 milliGRAM(s) IV Push every 6 hours PRN SBP >180    Vital Signs Last 24 Hrs  T(C): 36.6 (17 May 2019 11:31), Max: 36.6 (17 May 2019 08:10)  T(F): 97.8 (17 May 2019 11:31), Max: 97.9 (17 May 2019 08:10)  HR: 74 (17 May 2019 12:00) (56 - 95)  BP: 188/110 (17 May 2019 12:00) (137/74 - 218/108)  BP(mean): 131 (17 May 2019 12:00) (102 - 135)  RR: 23 (17 May 2019 12:00) (13 - 26)  SpO2: 100% (17 May 2019 12:00) (95% - 100%)    Daily     Daily Weight in k (17 May 2019 05:00)    19 @ 07:01  -  19 @ 07:00  --------------------------------------------------------  IN: 362.5 mL / OUT: 1875 mL / NET: -1512.5 mL    19 @ 07:01  -  19 @ 14:32  --------------------------------------------------------  IN: 0 mL / OUT: 820 mL / NET: -820 mL      CAPILLARY BLOOD GLUCOSE        PHYSICAL EXAM:      T(C): 36.6 (19 @ 11:31), Max: 36.6 (19 @ 08:10)  HR: 74 (19 @ 12:00) (56 - 95)  BP: 188/110 (19 @ 12:00) (137/74 - 218/108)  RR: 23 (19 @ 12:00) (13 - 26)  SpO2: 100% (19 @ 12:00) (95% - 100%)  Wt(kg): --  Respiratory: clear anteriorly, decreased BS at bases  Cardiovascular: S1 S2  Gastrointestinal: soft NT ND +BS  Extremities:   1 edema                  145  |  115<H>  |  64<H>  ----------------------------<  85  4.3   |  20<L>  |  5.04<H>    Ca    8.7      17 May 2019 03:21  Phos  4.6       Mg     2.5         TPro  7.3  /  Alb  3.4  /  TBili  0.7  /  DBili  x   /  AST  19  /  ALT  13  /  AlkPhos  78                            10.5   5.38  )-----------( 138      ( 17 May 2019 03:21 )             34.2     Creatinine Trend: 5.04<--, 5.54<--  Urinalysis Basic - ( 16 May 2019 15:42 )    Color: Yellow / Appearance: Slightly Turbid / S.010 / pH: x  Gluc: x / Ketone: Negative  / Bili: Negative / Urobili: Negative mg/dL   Blood: x / Protein: 500 mg/dL / Nitrite: Positive   Leuk Esterase: Moderate / RBC: 3-5 /HPF / WBC >50   Sq Epi: x / Non Sq Epi: Negative / Bacteria: Few        Assessment and Plan    EZEKIEL degree of CKD unclear; radiologically suspected polycystic kidney disease variant as patient without ESRD at current age;   HTN crisis, CVA post TPA;   Bp medication adjustments;   Supportive care for now; work up renal lesion once stable and GFR at its maximum;   Will follow course.

## 2019-05-17 NOTE — OCCUPATIONAL THERAPY INITIAL EVALUATION ADULT - TRANSFER TRAINING, PT EVAL
Pt will perform functional transfers independently with rolling walker to increase performance with ADLs.

## 2019-05-17 NOTE — PHARMACOTHERAPY INTERVENTION NOTE - COMMENTS
Spoke with NP regarding use fo both amlodipine and simvastatin, recommended to lower simvastatin dose to 20mg tablet.

## 2019-05-17 NOTE — PHYSICAL THERAPY INITIAL EVALUATION ADULT - CRITERIA FOR SKILLED THERAPEUTIC INTERVENTIONS
impairments found/therapy frequency/risk reduction/prevention/functional limitations in following categories/predicted duration of therapy intervention/rehab potential/anticipated discharge recommendation

## 2019-05-17 NOTE — PHYSICAL THERAPY INITIAL EVALUATION ADULT - ORIENTATION, REHAB EVAL
situation/person/Pt. is able to do complex problem solving but is limited owing to limited flexibility, insight, social behavior, and endurance. Pt. is susceptible to breakdown of behavior outside of a structured setting (e.g., school or work). In stressful situations, shows reserved cognitive functions. Cognitive processing is slow./place place/person/situation/Pt. is able to sustain and to switch attention sufficiently to integrate small amounts of information. Pt. initiates Activities but may still show some perseveration and impulsivity. Behavior can be partly modified by feedback. Recall over time is improved. s

## 2019-05-18 LAB
ANION GAP SERPL CALC-SCNC: 11 MMOL/L — SIGNIFICANT CHANGE UP (ref 5–17)
BUN SERPL-MCNC: 56 MG/DL — HIGH (ref 7–23)
CALCIUM SERPL-MCNC: 8.8 MG/DL — SIGNIFICANT CHANGE UP (ref 8.5–10.1)
CHLORIDE SERPL-SCNC: 115 MMOL/L — HIGH (ref 96–108)
CO2 SERPL-SCNC: 19 MMOL/L — LOW (ref 22–31)
CREAT SERPL-MCNC: 4.94 MG/DL — HIGH (ref 0.5–1.3)
EOSINOPHIL NFR URNS MANUAL: POSITIVE
GLUCOSE SERPL-MCNC: 84 MG/DL — SIGNIFICANT CHANGE UP (ref 70–99)
HCT VFR BLD CALC: 37.7 % — LOW (ref 39–50)
HGB BLD-MCNC: 11.8 G/DL — LOW (ref 13–17)
MAGNESIUM SERPL-MCNC: 2.4 MG/DL — SIGNIFICANT CHANGE UP (ref 1.6–2.6)
MCHC RBC-ENTMCNC: 31.2 PG — SIGNIFICANT CHANGE UP (ref 27–34)
MCHC RBC-ENTMCNC: 31.3 GM/DL — LOW (ref 32–36)
MCV RBC AUTO: 99.7 FL — SIGNIFICANT CHANGE UP (ref 80–100)
NRBC # BLD: 0 /100 WBCS — SIGNIFICANT CHANGE UP (ref 0–0)
PHOSPHATE SERPL-MCNC: 4.2 MG/DL — SIGNIFICANT CHANGE UP (ref 2.5–4.5)
PLATELET # BLD AUTO: 157 K/UL — SIGNIFICANT CHANGE UP (ref 150–400)
POTASSIUM SERPL-MCNC: 4.2 MMOL/L — SIGNIFICANT CHANGE UP (ref 3.5–5.3)
POTASSIUM SERPL-SCNC: 4.2 MMOL/L — SIGNIFICANT CHANGE UP (ref 3.5–5.3)
RBC # BLD: 3.78 M/UL — LOW (ref 4.2–5.8)
RBC # FLD: 14 % — SIGNIFICANT CHANGE UP (ref 10.3–14.5)
SODIUM SERPL-SCNC: 145 MMOL/L — SIGNIFICANT CHANGE UP (ref 135–145)
WBC # BLD: 5.74 K/UL — SIGNIFICANT CHANGE UP (ref 3.8–10.5)
WBC # FLD AUTO: 5.74 K/UL — SIGNIFICANT CHANGE UP (ref 3.8–10.5)

## 2019-05-18 PROCEDURE — 99233 SBSQ HOSP IP/OBS HIGH 50: CPT

## 2019-05-18 RX ORDER — METOPROLOL TARTRATE 50 MG
25 TABLET ORAL
Refills: 0 | Status: DISCONTINUED | OUTPATIENT
Start: 2019-05-18 | End: 2019-05-21

## 2019-05-18 RX ADMIN — AMLODIPINE BESYLATE 10 MILLIGRAM(S): 2.5 TABLET ORAL at 06:14

## 2019-05-18 RX ADMIN — Medication 12.5 MILLIGRAM(S): at 06:14

## 2019-05-18 RX ADMIN — HEPARIN SODIUM 5000 UNIT(S): 5000 INJECTION INTRAVENOUS; SUBCUTANEOUS at 17:40

## 2019-05-18 RX ADMIN — Medication 25 MILLIGRAM(S): at 17:40

## 2019-05-18 RX ADMIN — HEPARIN SODIUM 5000 UNIT(S): 5000 INJECTION INTRAVENOUS; SUBCUTANEOUS at 06:15

## 2019-05-18 RX ADMIN — Medication 325 MILLIGRAM(S): at 12:00

## 2019-05-18 RX ADMIN — CEFTRIAXONE 100 GRAM(S): 500 INJECTION, POWDER, FOR SOLUTION INTRAMUSCULAR; INTRAVENOUS at 17:40

## 2019-05-18 RX ADMIN — SIMVASTATIN 20 MILLIGRAM(S): 20 TABLET, FILM COATED ORAL at 22:12

## 2019-05-18 NOTE — CONSULT NOTE ADULT - SUBJECTIVE AND OBJECTIVE BOX
HPI:  79 years old man was reported to be last known well and around NOON when he was shopping with his wife at [facility]. He was reported to have a normal conversation with his wife at that time. Soon after that, he was reported to have language disturbance in the form of inability to answer any questions or follow any commands. He was brought to ER  further evaluation. Family denies that he had any episodes of focal neurological symptoms in the past even transient. (16 May 2019 18:57)    Pt was seen seated in chair with family present. He denies any h/o kidney problems.   Pt was downgraded from ICU yesterday.  Denies dysuria, hematuria, hesitancy, nocturia. Denies abdominal pain.      PAST MEDICAL & SURGICAL HISTORY:  Hypertension  Total hip replacement     Review of Systems:  as above    MEDICATIONS  (STANDING):  amLODIPine   Tablet 10 milliGRAM(s) Oral daily  aspirin 325 milliGRAM(s) Oral daily  cefTRIAXone   IVPB 1 Gram(s) IV Intermittent every 24 hours  heparin  Injectable 5000 Unit(s) SubCutaneous every 12 hours  metoprolol tartrate 25 milliGRAM(s) Oral two times a day  simvastatin 20 milliGRAM(s) Oral at bedtime    MEDICATIONS  (PRN):  hydrALAZINE Injectable 10 milliGRAM(s) IV Push every 6 hours PRN SBP >180    Allergies  No Known Allergies          Vital Signs Last 24 Hrs  T(C): 36.6 (18 May 2019 16:27), Max: 36.8 (18 May 2019 11:52)  T(F): 97.8 (18 May 2019 16:27), Max: 98.2 (18 May 2019 11:52)  HR: 69 (18 May 2019 16:27) (63 - 73)  BP: 143/92 (18 May 2019 16:27) (138/90 - 163/95)  BP(mean): --  RR: 16 (18 May 2019 16:27) (16 - 18)  SpO2: 99% (18 May 2019 16:27) (97% - 100%)    Physical Exam:  General:  Appears stated age, well-groomed, well-nourished, no distress  Eyes: EOMI, conjunctiva clear  HENT: WNL, no JVD  Chest: clear breath sounds  Cardiovascular:  Regular rate & rhythm  Abdomen:  soft nontender   : adequate meatus, testes descended b/l, no suprapubic tenderness or CVAT b/l  Extremities:  no pedal edema or calf tenderness noted  Skin:  No rash  Neuro/Psych:  Alert, oriented to time, place and person     LABS:                        11.8   5.74  )-----------( 157      ( 18 May 2019 07:15 )             37.7     05-18    145  |  115<H>  |  56<H>  ----------------------------<  84  4.2   |  19<L>  |  4.94<H>    Ca    8.8      18 May 2019 07:15  Phos  4.2     05-18  Mg     2.4     05-18      PT/INR - ( 17 May 2019 03:21 )   PT: 14.8 sec;   INR: 1.31 ratio         PTT - ( 17 May 2019 03:21 )  PTT:29.6 sec    Culture Results:   >=3 organisms. Probable collection contamination. (05-16 @ 21:13)      RADIOLOGY & ADDITIONAL STUDIES: < from: US Kidney and Bladder (05.17.19 @ 09:27) >  IMPRESSION:     Enlarged, polycystic kidneys.    There is a 9.1 cm solid and cystic mass in the lower pole of the right   kidney concerning for neoplasm. Renal protocol CT or MRI is recommended   for further evaluation.    Enlarged prostate gland.    < end of copied text >    Impression/Plan: 80yo male admitted with CVA s/p TPA found to have right renal mass and PKD  -Continue present medical management and renal Follow up  -recommend CTAP to evaluate kidneys. Will follow HPI:  79 years old man was reported to be last known well and around NOON when he was shopping with his wife at [facility]. He was reported to have a normal conversation with his wife at that time. Soon after that, he was reported to have language disturbance in the form of inability to answer any questions or follow any commands. He was brought to ER  further evaluation. Family denies that he had any episodes of focal neurological symptoms in the past even transient. (16 May 2019 18:57)    Pt was seen seated in chair with family present. He denies any h/o kidney problems.   Pt was downgraded from ICU yesterday.  Denies dysuria, hematuria, hesitancy, nocturia. Denies abdominal pain.      PAST MEDICAL & SURGICAL HISTORY:  Hypertension  Total hip replacement     Review of Systems:  as above    MEDICATIONS  (STANDING):  amLODIPine   Tablet 10 milliGRAM(s) Oral daily  aspirin 325 milliGRAM(s) Oral daily  cefTRIAXone   IVPB 1 Gram(s) IV Intermittent every 24 hours  heparin  Injectable 5000 Unit(s) SubCutaneous every 12 hours  metoprolol tartrate 25 milliGRAM(s) Oral two times a day  simvastatin 20 milliGRAM(s) Oral at bedtime    MEDICATIONS  (PRN):  hydrALAZINE Injectable 10 milliGRAM(s) IV Push every 6 hours PRN SBP >180    Allergies  No Known Allergies          Vital Signs Last 24 Hrs  T(C): 36.6 (18 May 2019 16:27), Max: 36.8 (18 May 2019 11:52)  T(F): 97.8 (18 May 2019 16:27), Max: 98.2 (18 May 2019 11:52)  HR: 69 (18 May 2019 16:27) (63 - 73)  BP: 143/92 (18 May 2019 16:27) (138/90 - 163/95)  BP(mean): --  RR: 16 (18 May 2019 16:27) (16 - 18)  SpO2: 99% (18 May 2019 16:27) (97% - 100%)    Physical Exam:  General:  Appears stated age, well-groomed, well-nourished, no distress  Eyes: EOMI, conjunctiva clear  HENT: WNL, no JVD  Chest: clear breath sounds  Cardiovascular:  Regular rate & rhythm  Abdomen: soft nontender palpable, nontender and reducible umbilical hernia  : adequate meatus, testes descended b/l, no suprapubic tenderness or CVAT b/l  Extremities:  no pedal edema or calf tenderness noted  Skin:  No rash  Neuro/Psych:  Alert, oriented to time, place and person     LABS:                        11.8   5.74  )-----------( 157      ( 18 May 2019 07:15 )             37.7     05-18    145  |  115<H>  |  56<H>  ----------------------------<  84  4.2   |  19<L>  |  4.94<H>    Ca    8.8      18 May 2019 07:15  Phos  4.2     05-18  Mg     2.4     05-18      PT/INR - ( 17 May 2019 03:21 )   PT: 14.8 sec;   INR: 1.31 ratio         PTT - ( 17 May 2019 03:21 )  PTT:29.6 sec    Culture Results:   >=3 organisms. Probable collection contamination. (05-16 @ 21:13)      RADIOLOGY & ADDITIONAL STUDIES: < from: US Kidney and Bladder (05.17.19 @ 09:27) >  IMPRESSION:     Enlarged, polycystic kidneys.    There is a 9.1 cm solid and cystic mass in the lower pole of the right   kidney concerning for neoplasm. Renal protocol CT or MRI is recommended   for further evaluation.    Enlarged prostate gland.    < end of copied text >    Impression/Plan: 78yo male admitted with CVA s/p TPA found to have right renal mass and PKD  -Continue present medical management and renal Follow up  -recommend CTAP to evaluate kidneys. Will follow

## 2019-05-18 NOTE — PROGRESS NOTE ADULT - ASSESSMENT
Pt is an 78 yo M with h/o HTN who had an acute language disturbance in the form of inability to answer any questions or follow any commands. Pt was brought to ER  and CT Age-indeterminate infarct within the left parieto-occipital lobe. Pt Rx'd with TPA admitted to the ICU. Pt worked up for elevated Cr with renal US and incidentally found to Enlarged, polycystic kidneys. There is a 9.1 cm solid and cystic  in the lower pole of the right kidney concerning for neoplasm  Resp: Supplemental O2 prn  ID: U/A with >50 WBC; Ceftriaxone   urine cx >3 organism treat for 3 days and then consider d/c   CVS: BP control  AFIB:   Heme: started on ASA  per ICU staff  on 5/17/19    FEN: Po diet  Renal: Renal evaluation of polycystic kidneys and R kidney abnormality showing a solid cystic mass I will consult urology dr. el   Neuro: neurology note reviewed and ct head from 5/17/19 noted .   Social:  PT eval , OT eval

## 2019-05-19 LAB
ANION GAP SERPL CALC-SCNC: 12 MMOL/L — SIGNIFICANT CHANGE UP (ref 5–17)
BUN SERPL-MCNC: 62 MG/DL — HIGH (ref 7–23)
CALCIUM SERPL-MCNC: 8.4 MG/DL — LOW (ref 8.5–10.1)
CHLORIDE SERPL-SCNC: 114 MMOL/L — HIGH (ref 96–108)
CO2 SERPL-SCNC: 19 MMOL/L — LOW (ref 22–31)
CREAT SERPL-MCNC: 5.27 MG/DL — HIGH (ref 0.5–1.3)
FOLATE SERPL-MCNC: 7.4 NG/ML — SIGNIFICANT CHANGE UP
GLUCOSE SERPL-MCNC: 77 MG/DL — SIGNIFICANT CHANGE UP (ref 70–99)
HCT VFR BLD CALC: 35.8 % — LOW (ref 39–50)
HGB BLD-MCNC: 10.9 G/DL — LOW (ref 13–17)
MAGNESIUM SERPL-MCNC: 2.5 MG/DL — SIGNIFICANT CHANGE UP (ref 1.6–2.6)
MCHC RBC-ENTMCNC: 30.4 GM/DL — LOW (ref 32–36)
MCHC RBC-ENTMCNC: 30.6 PG — SIGNIFICANT CHANGE UP (ref 27–34)
MCV RBC AUTO: 100.6 FL — HIGH (ref 80–100)
NRBC # BLD: 0 /100 WBCS — SIGNIFICANT CHANGE UP (ref 0–0)
PHOSPHATE SERPL-MCNC: 4.7 MG/DL — HIGH (ref 2.5–4.5)
PLATELET # BLD AUTO: 157 K/UL — SIGNIFICANT CHANGE UP (ref 150–400)
POTASSIUM SERPL-MCNC: 4.3 MMOL/L — SIGNIFICANT CHANGE UP (ref 3.5–5.3)
POTASSIUM SERPL-SCNC: 4.3 MMOL/L — SIGNIFICANT CHANGE UP (ref 3.5–5.3)
RBC # BLD: 3.56 M/UL — LOW (ref 4.2–5.8)
RBC # FLD: 14 % — SIGNIFICANT CHANGE UP (ref 10.3–14.5)
SODIUM SERPL-SCNC: 145 MMOL/L — SIGNIFICANT CHANGE UP (ref 135–145)
T4 FREE SERPL-MCNC: 1.3 NG/DL — SIGNIFICANT CHANGE UP (ref 0.9–1.8)
TSH SERPL-MCNC: 2.62 UU/ML — SIGNIFICANT CHANGE UP (ref 0.36–3.74)
VIT B12 SERPL-MCNC: 489 PG/ML — SIGNIFICANT CHANGE UP (ref 232–1245)
WBC # BLD: 5.31 K/UL — SIGNIFICANT CHANGE UP (ref 3.8–10.5)
WBC # FLD AUTO: 5.31 K/UL — SIGNIFICANT CHANGE UP (ref 3.8–10.5)

## 2019-05-19 PROCEDURE — 99233 SBSQ HOSP IP/OBS HIGH 50: CPT

## 2019-05-19 PROCEDURE — 74176 CT ABD & PELVIS W/O CONTRAST: CPT | Mod: 26

## 2019-05-19 RX ADMIN — HEPARIN SODIUM 5000 UNIT(S): 5000 INJECTION INTRAVENOUS; SUBCUTANEOUS at 05:43

## 2019-05-19 RX ADMIN — SIMVASTATIN 20 MILLIGRAM(S): 20 TABLET, FILM COATED ORAL at 23:19

## 2019-05-19 RX ADMIN — HEPARIN SODIUM 5000 UNIT(S): 5000 INJECTION INTRAVENOUS; SUBCUTANEOUS at 18:55

## 2019-05-19 RX ADMIN — AMLODIPINE BESYLATE 10 MILLIGRAM(S): 2.5 TABLET ORAL at 05:43

## 2019-05-19 RX ADMIN — Medication 325 MILLIGRAM(S): at 12:25

## 2019-05-19 RX ADMIN — Medication 25 MILLIGRAM(S): at 18:55

## 2019-05-19 RX ADMIN — CEFTRIAXONE 100 GRAM(S): 500 INJECTION, POWDER, FOR SOLUTION INTRAMUSCULAR; INTRAVENOUS at 18:55

## 2019-05-19 NOTE — PROGRESS NOTE ADULT - ASSESSMENT
Pt is an 78 yo M with h/o HTN who had an acute language disturbance in the form of inability to answer any questions or follow any commands. Pt was brought to ER  and CT Age-indeterminate infarct within the left parieto-occipital lobe. Pt Rx'd with TPA admitted to the ICU. Pt worked up for elevated Cr with renal US and incidentally found to Enlarged, polycystic kidneys. There is a 9.1 cm solid and cystic  in the lower pole of the right kidney concerning for neoplasm  Resp: Supplemental O2 prn  ID: U/A with >50 WBC; Ceftriaxone   urine cx >3 organism treat for 3 days and then consider d/c   CVS: BP control bp better with the increase in Metoprolol titrate as needed  AFIB:  I  d/w neurology if can start AC given ct head findings and recent use of TPA. already on ASA. neurology recommenders starting AC 2 weeks post cva if still required  Heme: started on ASA  per ICU staff  on 5/17/19    FEN: Po diet  Renal: Renal evaluation of polycystic kidneys and R kidney abnormality showing a solid cystic mass , consulted t urology dr. el  and reviewed ct scan done w/o contrast due to EZEKIEL no renal mass noted .  may need MRI will need to d/w nephrology  Neuro: neurology note reviewed and ct head from 5/17/19 noted .   Social:  PT eval , OT eval    PT recommend acute rehab  weekend  CM/SW  are aware Pt is an 80 yo M with h/o HTN who had an acute language disturbance in the form of inability to answer any questions or follow any commands. Pt was brought to ER  and CT Age-indeterminate infarct within the left parieto-occipital lobe. Pt Rx'd with TPA admitted to the ICU. Pt worked up for elevated Cr with renal US and incidentally found to Enlarged, polycystic kidneys. There is a 9.1 cm solid and cystic  in the lower pole of the right kidney concerning for neoplasm  Resp: Supplemental O2 prn  ID: U/A with >50 WBC; Ceftriaxone   urine cx >3 organism treat for 3 days and then consider d/c   CVS: BP control bp better with the increase in Metoprolol titrate as needed  AFIB: lamine controlled.   I  d/w neurology if can start AC given ct head findings and recent use of TPA. already on ASA. neurology recommended starting AC 2 weeks post CVA if still required   CHF diastolic dysfunction presumed chronic currently stable. can consider  inpt cardiology consult as needed i have provided family with number Dr. Wolff  Heme: started on ASA  per ICU staff  on 5/17/19    FEN: Po diet  Renal: Renal evaluation of polycystic kidneys and R kidney abnormality showing a solid cystic mass , consulted t urology dr. el  and reviewed ct scan done w/o contrast due to EZEKIEL no renal mass noted .  may need MRI will need to d/w nephrology  Neuro: neurology note reviewed and ct head from 5/17/19 noted .   Social:  PT eval , OT eval    PT recommend acute rehab  weekend  CM/SW  are aware Pt is an 78 yo M with h/o HTN who had an acute language disturbance in the form of inability to answer any questions or follow any commands. Pt was brought to ER  and CT Age-indeterminate infarct within the left parieto-occipital lobe. Pt Rx'd with TPA admitted to the ICU. Pt worked up for elevated Cr with renal US and incidentally found to Enlarged, polycystic kidneys. There is a 9.1 cm solid and cystic  in the lower pole of the right kidney concerning for neoplasm  Resp: Supplemental O2 prn  ID: U/A with >50 WBC; Ceftriaxone   urine cx >3 organism treat for 3 days and then consider d/c  STOP ON 5/20/19  CVS: BP control bp better with the increase in Metoprolol titrate as needed  AFIB: lamine controlled.   I  d/w neurology if can start AC given ct head findings and recent use of TPA. already on ASA. neurology recommended starting AC 2 weeks post CVA if still required   CHF diastolic dysfunction presumed chronic currently stable. can consider  inpt cardiology consult as needed i have provided family with number Dr. Wolff  Heme: started on ASA  per ICU staff  on 5/17/19    FEN: Po diet  Renal: Renal evaluation of polycystic kidneys and R kidney abnormality showing a solid cystic mass , consulted t urology dr. el  and reviewed ct scan done w/o contrast due to EZEKIEL no renal mass noted .  may need MRI will need to d/w nephrology  Neuro: neurology note reviewed and ct head from 5/17/19 noted .   Social:  PT eval , OT eval    PT recommend acute rehab  weekend  CM/SW  are aware

## 2019-05-20 ENCOUNTER — TRANSCRIPTION ENCOUNTER (OUTPATIENT)
Age: 79
End: 2019-05-20

## 2019-05-20 DIAGNOSIS — I50.32 CHRONIC DIASTOLIC (CONGESTIVE) HEART FAILURE: ICD-10-CM

## 2019-05-20 DIAGNOSIS — G93.6 CEREBRAL EDEMA: ICD-10-CM

## 2019-05-20 DIAGNOSIS — Q61.3 POLYCYSTIC KIDNEY, UNSPECIFIED: ICD-10-CM

## 2019-05-20 DIAGNOSIS — I48.2 CHRONIC ATRIAL FIBRILLATION: ICD-10-CM

## 2019-05-20 LAB
ANION GAP SERPL CALC-SCNC: 14 MMOL/L — SIGNIFICANT CHANGE UP (ref 5–17)
BUN SERPL-MCNC: 69 MG/DL — HIGH (ref 7–23)
CALCIUM SERPL-MCNC: 8.6 MG/DL — SIGNIFICANT CHANGE UP (ref 8.5–10.1)
CHLORIDE SERPL-SCNC: 110 MMOL/L — HIGH (ref 96–108)
CO2 SERPL-SCNC: 19 MMOL/L — LOW (ref 22–31)
CREAT SERPL-MCNC: 5.57 MG/DL — HIGH (ref 0.5–1.3)
GLUCOSE SERPL-MCNC: 80 MG/DL — SIGNIFICANT CHANGE UP (ref 70–99)
POTASSIUM SERPL-MCNC: 4.2 MMOL/L — SIGNIFICANT CHANGE UP (ref 3.5–5.3)
POTASSIUM SERPL-SCNC: 4.2 MMOL/L — SIGNIFICANT CHANGE UP (ref 3.5–5.3)
SODIUM SERPL-SCNC: 143 MMOL/L — SIGNIFICANT CHANGE UP (ref 135–145)

## 2019-05-20 PROCEDURE — 99233 SBSQ HOSP IP/OBS HIGH 50: CPT

## 2019-05-20 RX ORDER — ISOSORBIDE MONONITRATE 60 MG/1
1 TABLET, EXTENDED RELEASE ORAL
Qty: 0 | Refills: 0 | DISCHARGE
Start: 2019-05-20

## 2019-05-20 RX ORDER — ISOSORBIDE MONONITRATE 60 MG/1
30 TABLET, EXTENDED RELEASE ORAL DAILY
Refills: 0 | Status: DISCONTINUED | OUTPATIENT
Start: 2019-05-20 | End: 2019-05-21

## 2019-05-20 RX ORDER — SIMVASTATIN 20 MG/1
1 TABLET, FILM COATED ORAL
Qty: 0 | Refills: 0 | DISCHARGE
Start: 2019-05-20

## 2019-05-20 RX ORDER — METOPROLOL TARTRATE 50 MG
1 TABLET ORAL
Qty: 0 | Refills: 0 | DISCHARGE
Start: 2019-05-20

## 2019-05-20 RX ORDER — HYDRALAZINE HCL 50 MG
1 TABLET ORAL
Qty: 60 | Refills: 0
Start: 2019-05-20 | End: 2019-06-18

## 2019-05-20 RX ORDER — AMLODIPINE BESYLATE 2.5 MG/1
1 TABLET ORAL
Qty: 0 | Refills: 0 | DISCHARGE
Start: 2019-05-20

## 2019-05-20 RX ORDER — ASPIRIN/CALCIUM CARB/MAGNESIUM 324 MG
1 TABLET ORAL
Qty: 0 | Refills: 0 | DISCHARGE
Start: 2019-05-20

## 2019-05-20 RX ORDER — HYDRALAZINE HCL 50 MG
10 TABLET ORAL
Refills: 0 | Status: DISCONTINUED | OUTPATIENT
Start: 2019-05-20 | End: 2019-05-21

## 2019-05-20 RX ADMIN — CEFTRIAXONE 100 GRAM(S): 500 INJECTION, POWDER, FOR SOLUTION INTRAMUSCULAR; INTRAVENOUS at 18:40

## 2019-05-20 RX ADMIN — AMLODIPINE BESYLATE 10 MILLIGRAM(S): 2.5 TABLET ORAL at 06:29

## 2019-05-20 RX ADMIN — Medication 325 MILLIGRAM(S): at 11:07

## 2019-05-20 RX ADMIN — HEPARIN SODIUM 5000 UNIT(S): 5000 INJECTION INTRAVENOUS; SUBCUTANEOUS at 06:29

## 2019-05-20 RX ADMIN — HEPARIN SODIUM 5000 UNIT(S): 5000 INJECTION INTRAVENOUS; SUBCUTANEOUS at 17:44

## 2019-05-20 RX ADMIN — Medication 10 MILLIGRAM(S): at 18:41

## 2019-05-20 RX ADMIN — ISOSORBIDE MONONITRATE 30 MILLIGRAM(S): 60 TABLET, EXTENDED RELEASE ORAL at 18:40

## 2019-05-20 RX ADMIN — SIMVASTATIN 20 MILLIGRAM(S): 20 TABLET, FILM COATED ORAL at 21:11

## 2019-05-20 RX ADMIN — Medication 25 MILLIGRAM(S): at 18:40

## 2019-05-20 NOTE — DISCHARGE NOTE PROVIDER - NSDCCPCAREPLAN_GEN_ALL_CORE_FT
PRINCIPAL DISCHARGE DIAGNOSIS  Diagnosis: Cerebrovascular accident (CVA)  Assessment and Plan of Treatment: received tPA, monitored in ICU, evaluated and followed by neurology, PT eval, speech eval, OT eval.      SECONDARY DISCHARGE DIAGNOSES  Diagnosis: Chronic kidney disease (CKD)  Assessment and Plan of Treatment: - Evaluated by nephrology.  - Avoid all nephrotoxic agents      Diagnosis: CVA (cerebral vascular accident)  Assessment and Plan of Treatment:

## 2019-05-20 NOTE — PROGRESS NOTE ADULT - ASSESSMENT
Pt is an 80 yo M with h/o HTN who had an acute language disturbance in the form of inability to answer any questions or follow any commands. Pt was brought to ER  and CT Age-indeterminate infarct within the left parieto-occipital lobe. Pt Rx'd with TPA admitted to the ICU. Pt worked up for elevated Cr with renal US and incidentally found to Enlarged, polycystic kidneys. There is a 9.1 cm solid and cystic  in the lower pole of the right kidney concerning for neoplasm.    Acute CVA.  -MRI brain- acute left parietal stroke with petechial hemorrhage.  -MRA head/neck- moderate left vertebral and right PCA stenosis.  -Echo- normal EF  -Neurology following  -Acute Rehab    AFIB: rate controlled.    - can start AC given ct head findings and recent use of TPA.   - already on ASA.   - neurology recommended starting AC 2 weeks post CVA if still required.    CHF diastolic dysfunction presumed chronic - currently stable.   - can consider inpt cardiology consult as needed. (provided family with number Dr. Peraltanirmalderick)    Polycystic kidneys and R kidney abnormality showing a solid cystic mass.  -  was consulted.  - no renal mass noted on CT.  - I spoke with SHARITA Cornelius to d/c physical therapy with outpatient follow up.    ID: U/A with >50 WBC; Ceftriaxone   urine cx >3 organism treat for 3 days and then consider d/c  STOP ON 5/20/19.    CVS: BP control bp better with the increase in Metoprolol titrate as needed     PT recommend acute rehab Pt is an 80 yo M with h/o HTN who had an acute language disturbance in the form of inability to answer any questions or follow any commands. Pt was brought to ER  and CT Age-indeterminate infarct within the left parieto-occipital lobe. Pt Rx'd with TPA admitted to the ICU. Pt worked up for elevated Cr with renal US and incidentally found to Enlarged, polycystic kidneys. There is a 9.1 cm solid and cystic  in the lower pole of the right kidney concerning for neoplasm.    Acute CVA.  -MRI brain- acute left parietal stroke with petechial hemorrhage.  - S/p tPA  -MRA head/neck- moderate left vertebral and right PCA stenosis.  -Echo- normal EF  -Neurology following  -Acute Rehab    AFIB: rate controlled.    - can start AC given ct head findings and recent use of TPA.   - already on ASA.   - neurology recommended starting AC 2 weeks post CVA if still required.    CHF diastolic dysfunction presumed chronic - currently stable.   - can consider inpt cardiology consult as needed. (provided family with number Dr. Wolff)    Polycystic kidneys and R kidney abnormality showing a solid cystic mass.  -  was consulted.  - no renal mass noted on CT.  - I spoke with SHARITA Cornelius to d/c physical therapy with outpatient follow up.    ID: U/A with >50 WBC; Ceftriaxone   urine cx >3 organism treat for 3 days and then consider d/c  STOP ON 5/20/19.    CVS: BP control bp better with the increase in Metoprolol titrate as needed     PT recommend acute rehab Pt is an 78 yo M with h/o HTN who had an acute language disturbance in the form of inability to answer any questions or follow any commands. Pt was brought to ER  and CT Age-indeterminate infarct within the left parieto-occipital lobe. Pt Rx'd with TPA admitted to the ICU. Pt worked up for elevated Cr with renal US and incidentally found to Enlarged, polycystic kidneys. There is a 9.1 cm solid and cystic  in the lower pole of the right kidney concerning for neoplasm.                     PT recommend acute rehab

## 2019-05-20 NOTE — PROGRESS NOTE ADULT - PROVIDER SPECIALTY LIST ADULT
Critical Care
Hospitalist
Hospitalist
Nephrology
Neurology
Urology
Hospitalist

## 2019-05-20 NOTE — DISCHARGE NOTE PROVIDER - HOSPITAL COURSE
79 years old man was reported to be last known well and around NOON when he was shopping with his wife at [employer]. He was reported to have a normal conversation with his wife at that time. Soon after that, he was reported to have language disturbance in the form of inability to answer any questions or follow any commands. He was brought to ER  further evaluation.      CT head: age indeterminate infarct left parietal-occipital lobe.    He had received tPA,     MR head : Small completed subacute left-sided infarct. Trace petechial hemorrhage versus developing laminar necrosis .    MR angio head:  Moderate stenosis involving the right PCA P3 segment. Moderate stenosis involving the distal left vertebral artery intracranial V4 segment.     Neurology was consulted.    Patient admitted to ICU for observation.          Repeat CT head done 5/17 : infarct extension and increasing swelling in the left posterior MCA  distribution, as compared to the prior CT study. No micro hemorrhage  cannot be excluded, there is no obvious hematoma. No midline shift. 2)  the infarct is somewhat more prominent, when compared with the prior  MR dated 5/16/2019.        Patient started on antihypertensive, to maintain sbp around 140's for now. Started on aspirin and Zocor. 2 D-echo done.         After being observe in ICU, he was transferred to [facility].  He was evaluated by  for renal cyst, and Renal for CKD-5.        At present, patient is stable and will be transferred to acute rehab.  He will will follow with neurology, renal, , cardiology as outpatient.        Summary:    Problem/Plan - 1:    ·  Problem: Cerebral edema.  Plan: as a result of acute CVA.          Problem/Plan - 2:    ·  Problem: CVA (cerebral vascular accident).  Plan: Acute CVA.    -MRI brain- acute left parietal stroke with petechial hemorrhage.    -cytotoxic cerebral edema.    - on zocor, lopressor    - S/p tPA    -MRA head/neck- moderate left vertebral and right PCA stenosis.    -Echo- normal EF    -Neurology following    -Acute Rehab.          Problem/Plan - 3:    ·  Problem: Atrial fibrillation, chronic.  Plan: AFIB: rate controlled.      - already on ASA.     - As per neurology recommendation, AC can be restarted 2 weeks post CVA, as of 5/30/19.          Problem/Plan - 4:    ·  Problem: Polycystic kidney disease.  Plan: Polycystic kidneys and R kidney abnormality showing a solid cystic mass.    -  was consulted.    - no renal mass noted on CT.    - I spoke with SHARITA Cornelius to d/c physical therapy with outpatient follow up.          Problem/Plan - 5:    ·  Problem: Chronic diastolic congestive heart failure.  Plan: -currently stable.     - on lopressor    - follow up with cardiology as outpatient.    - CKD-5, not ACE or ARB.  Start low dose imdur, hydralazine.         It took 35 minutes to discharge the patient.

## 2019-05-20 NOTE — DISCHARGE NOTE PROVIDER - CARE PROVIDERS DIRECT ADDRESSES
,DirectAddress_Unknown,DirectAddress_Unknown,[email or web address],DirectAddress_Unknown

## 2019-05-20 NOTE — PROGRESS NOTE ADULT - SUBJECTIVE AND OBJECTIVE BOX
HPI:  Pt is an 78 yo M with h/o HTN who had an acute language disturbance in the form of inability to answer any questions or follow any commands. Pt was brought to ER  and CT Age-indeterminate infarct within the left parieto-occipital lobe. Pt Rx'd with TPA admitted to the ICU. Pt worked up for elevated Cr with renal US and incidentally found to Enlarged, polycystic kidneys. There is a 9.1 cm solid and cystic  in the lower pole of the right kidney concerning for neoplasm       MEDICATIONS  (STANDING):  amLODIPine   Tablet 10 milliGRAM(s) Oral daily  aspirin 325 milliGRAM(s) Oral daily  cefTRIAXone   IVPB 1 Gram(s) IV Intermittent every 24 hours  heparin  Injectable 5000 Unit(s) SubCutaneous every 12 hours  metoprolol tartrate 25 milliGRAM(s) Oral two times a day  simvastatin 20 milliGRAM(s) Oral at bedtime    MEDICATIONS  (PRN):  hydrALAZINE Injectable 10 milliGRAM(s) IV Push every 6 hours PRN SBP >180      Vital Signs Last 24 Hrs  T(C): 37.6 (19 May 2019 11:19), Max: 37.6 (19 May 2019 11:19)  T(F): 99.6 (19 May 2019 11:19), Max: 99.6 (19 May 2019 11:19)  HR: 60 (19 May 2019 11:19) (57 - 69)  BP: 136/76 (19 May 2019 11:19) (127/77 - 150/77)  BP(mean): --  RR: 18 (19 May 2019 11:19) (15 - 18)  SpO2: 100% (19 May 2019 11:19) (99% - 100%)      Gen: lying comfortably in bed in no apparent distress  Lungs: CTA  Heart: Irreg  Abd: Soft/+BS  Ext: No edema  Neuro: Motor strength = b/l but pt with some word finding difficulty    LABS:                              10.9   5.31  )-----------( 157      ( 19 May 2019 06:49 )             35.8   05-19    145  |  114<H>  |  62<H>  ----------------------------<  77  4.3   |  19<L>  |  5.27<H>    Ca    8.4<L>      19 May 2019 06:49  Phos  4.7     05-19  Mg     2.5     05-19        RAD:  < from: CT Head No Cont (05.17.19 @ 14:31) >    IMPRESSION:    1)  infarct extension and increasing swelling in the left posterior MCA   distribution, as compared tothe prior CT study. No microhemorrhage   cannot be excluded, there is no obvious hematoma. No midline shift.  2)  the infarct is somewhat more prominent, when compared with the prior   MR dated 5/16/2019.    < from: CT Abdomen and Pelvis No Cont (05.19.19 @ 10:50) >    IMPRESSION:    Enlarged bilateral polycystic kidneys. No hydronephrosis. No definite   renal mass is seen however evaluation limited without intravenous   contrast.        < end of copied text >
HPI:  Pt is an 80 yo M with h/o HTN who had an acute language disturbance in the form of inability to answer any questions or follow any commands. Pt was brought to ER  and CT Age-indeterminate infarct within the left parieto-occipital lobe. Pt Rx'd with TPA admitted to the ICU. Pt worked up for elevated Cr with renal US and incidentally found to Enlarged, polycystic kidneys. There is a 9.1 cm solid and cystic  in the lower pole of the right kidney concerning for neoplasm           MEDICATIONS  (STANDING):  amLODIPine   Tablet 10 milliGRAM(s) Oral daily  aspirin 325 milliGRAM(s) Oral daily  cefTRIAXone   IVPB 1 Gram(s) IV Intermittent every 24 hours  heparin  Injectable 5000 Unit(s) SubCutaneous every 12 hours  metoprolol tartrate 12.5 milliGRAM(s) Oral two times a day  simvastatin 20 milliGRAM(s) Oral at bedtime    MEDICATIONS  (PRN):  hydrALAZINE Injectable 10 milliGRAM(s) IV Push every 6 hours PRN SBP >180             Vital Signs Last 24 Hrs  T(C): 36.4 (18 May 2019 05:00), Max: 36.6 (17 May 2019 11:31)  T(F): 97.6 (18 May 2019 05:00), Max: 97.9 (18 May 2019 00:00)  HR: 71 (18 May 2019 08:56) (62 - 75)  BP: 152/85 (18 May 2019 08:57) (152/85 - 188/110)  BP(mean): 111 (17 May 2019 16:45) (111 - 138)  RR: 18 (18 May 2019 08:56) (17 - 23)  SpO2: 98% (18 May 2019 08:56) (95% - 100%)        Gen: lying comfortably in bed in no apparent distress  Lungs: CTA  Heart: Irreg  Abd: Soft/+BS  Ext: No edema  Neuro: Motor strength = b/l but pt with some word finding difficulty    LABS:                        11.8   5.74  )-----------( 157      ( 18 May 2019 07:15 )             37.7     05-18    145  |  115<H>  |  56<H>  ----------------------------<  84  4.2   |  19<L>  |  4.94<H>    Ca    8.8      18 May 2019 07:15  Phos  4.2     05-18  Mg     2.4     05-18    TPro  7.3  /  Alb  3.4  /  TBili  0.7  /  DBili  x   /  AST  19  /  ALT  13  /  AlkPhos  78  05-16     RAD:  < from: CT Head No Cont (05.17.19 @ 14:31) >    IMPRESSION:    1)  infarct extension and increasing swelling in the left posterior MCA   distribution, as compared tothe prior CT study. No microhemorrhage   cannot be excluded, there is no obvious hematoma. No midline shift.  2)  the infarct is somewhat more prominent, when compared with the prior   MR dated 5/16/2019.
HPI:  Pt is an 80 yo M with h/o HTN who had an acute language disturbance in the form of inability to answer any questions or follow any commands. Pt was brought to ER  and CT Age-indeterminate infarct within the left parieto-occipital lobe. Pt Rx'd with TPA admitted to the ICU. Pt worked up for elevated Cr with renal US and incidentally found to Enlarged, polycystic kidneys. There is a 9.1 cm solid and cystic  in the lower pole of the right kidney concerning for neoplasm       ## Labs:  CBC:                        10.5   5.38  )-----------( 138      ( 17 May 2019 03:21 )             34.2     Chem:      145  |  115<H>  |  64<H>  ----------------------------<  85  4.3   |  20<L>  |  5.04<H>    Ca    8.7      17 May 2019 03:21  Phos  4.6       Mg     2.5         TPro  7.3  /  Alb  3.4  /  TBili  0.7  /  DBili  x   /  AST  19  /  ALT  13  /  AlkPhos  78  -    Coags:  PT/INR - ( 17 May 2019 03:21 )   PT: 14.8 sec;   INR: 1.31 ratio         PTT - ( 17 May 2019 03:21 )  PTT:29.6 sec        ## Imaging:    ## Medications:  cefTRIAXone   IVPB 1 Gram(s) IV Intermittent every 24 hours    amLODIPine   Tablet 10 milliGRAM(s) Oral daily  hydrALAZINE Injectable 10 milliGRAM(s) IV Push every 6 hours PRN  metoprolol tartrate 12.5 milliGRAM(s) Oral two times a day                ## Vitals:  T(C): 36.6 (19 @ 11:31), Max: 36.6 (19 @ 08:10)  HR: 64 (19 @ 15:03) (56 - 95)  BP: 188/106 (19 @ 15:03) (144/69 - 218/108)  BP(mean): 127 (19 @ 15:03) (102 - 135)  RR: 18 (19 @ 15:03) (13 - 26)  SpO2: 99% (19 @ 15:03) (95% - 100%)  Wt(kg): --  Vent:   AB-16 @ 07:01  -   @ 07:00  --------------------------------------------------------  IN: 362.5 mL / OUT: 1875 mL / NET: -1512.5 mL     @ 07:01  -   @ 15:56  --------------------------------------------------------  IN: 0 mL / OUT: 820 mL / NET: -820 mL          ## P/E:  Gen: lying comfortably in bed in no apparent distress  Lungs: CTA  Heart: Irreg  Abd: Soft/+BS  Ext: No edema  Neuro: Motor strength = b/l but pt with some word finding difficulty    CENTRAL LINE: [ ] YES [ ] NO  LOCATION:   DATE INSERTED:  REMOVE: [ ] YES [ ] NO      ALANNA: [ ] YES [ ] NO    DATE INSERTED:  REMOVE:  [ ] YES [ ] NO      A-LINE:  [ ] YES [ ] NO  LOCATION:   DATE INSERTED:  REMOVE:  [ ] YES [ ] NO  EXPLAIN:    CODE STATUS: [x ] full code  [ ] DNR  [ ] DNI  [ ] [facility]  Goals of care discussion: [ ] yes
Neurology Progress Note    No acute events.     Neuro Exam: AOx2. Mild expressive aphasia. Subtle right facial droop. Moving all four extremities. Finger to nose intact.    MRI brain- acute left parietal stroke with petechial hemorrhage  Echo- normal EF  MRA head/neck- moderate left vertebral and right PCA stenosis  LDL- 91  HgbA1c- 5.0    A/P:  Acute left parietal stroke s/p IV tPA  A-fib  HTN  UTI    - continue aspirin and Zocor for secondary stroke prevention.  - May start anticoagulation for A-fib in 2 weeks from time of stroke (5/30/19)  - D/C planning to rehab.
Neurology Progress Note    No acute events.     Neuro Exam: AOx2. Mild expressive aphasia. Subtle right facial droop. Moving all four extremities. Finger to nose intact.    MRI brain- acute left parietal stroke with petechial hemorrhage  Echo- normal EF  MRA head/neck- moderate left vertebral and right PCA stenosis  LDL- 91  HgbA1c- 5.0    A/P:  Acute left parietal stroke s/p IV tPA  HTN  UTI    - continue aspirin and Zocor for secondary stroke prevention  - D/C planning to rehab.
Patient feels well no new complaints today.  Family at bedside; son who reveals he has " cysts" on his kidneys but kidney function " good"; He is 57.    MEDICATIONS  (STANDING):  amLODIPine   Tablet 10 milliGRAM(s) Oral daily  aspirin 325 milliGRAM(s) Oral daily  cefTRIAXone   IVPB 1 Gram(s) IV Intermittent every 24 hours  heparin  Injectable 5000 Unit(s) SubCutaneous every 12 hours  metoprolol tartrate 25 milliGRAM(s) Oral two times a day  simvastatin 20 milliGRAM(s) Oral at bedtime    MEDICATIONS  (PRN):  hydrALAZINE Injectable 10 milliGRAM(s) IV Push every 6 hours PRN SBP >180      05-19-19 @ 07:01  -  05-20-19 @ 07:00  --------------------------------------------------------  IN: 240 mL / OUT: 500 mL / NET: -260 mL    05-20-19 @ 07:01  -  05-20-19 @ 13:34  --------------------------------------------------------  IN: 360 mL / OUT: 0 mL / NET: 360 mL      PHYSICAL EXAM:      T(C): 36.2 (05-20-19 @ 11:45), Max: 36.7 (05-19-19 @ 23:47)  HR: 63 (05-20-19 @ 11:45) (57 - 72)  BP: 127/84 (05-20-19 @ 11:45) (127/84 - 166/81)  RR: 17 (05-20-19 @ 11:45) (16 - 18)  SpO2: 99% (05-20-19 @ 11:45) (96% - 99%)  Wt(kg): --  Respiratory: clear anteriorly, decreased BS at bases  Cardiovascular: S1 S2  Gastrointestinal: soft NT ND +BS  Extremities:   tr edema                                    10.9   5.31  )-----------( 157      ( 19 May 2019 06:49 )             35.8     05-20    143  |  110<H>  |  69<H>  ----------------------------<  80  4.2   |  19<L>  |  5.57<H>    Ca    8.6      20 May 2019 06:20  Phos  4.7     05-19  Mg     2.5     05-19          Creatinine Trend: 5.57<--, 5.27<--, 4.94<--, 5.04<--, 5.54<--      Assessment and Plan:    EZEKIEL degree of CKD unclear; suspected CKD 4 with radiologically suspected polycystic kidney disease variant as patient without ESRD at current age;   HTN crisis, CVA post TPA; Afib;   Continue current rx; goal  DBP 70 range;   Discussed with wife and son at bedside.
Patient seen and examined bedside resting comfortably.  Seated in chair.  No new complaints offered.  Denies abdominal pain, fever, chills, dysuria.    T(F): 97.2 (05-20-19 @ 11:45), Max: 98 (05-19-19 @ 23:47)  HR: 63 (05-20-19 @ 11:45) (57 - 72)  BP: 127/84 (05-20-19 @ 11:45) (127/84 - 166/81)  RR: 17 (05-20-19 @ 11:45) (16 - 18)  SpO2: 99% (05-20-19 @ 11:45) (96% - 99%)    PHYSICAL EXAM:  General: NAD, A&O  HEENT: NCAT, EOMI, conjunctiva clear  Chest: nonlabored respirations, good inspiratory effort  Abdomen: soft, NTND.   Extremities: no pedal edema or calf tenderness noted   : no suprapubic tenderness or CVAT b/l    LABS:                        10.9   5.31  )-----------( 157      ( 19 May 2019 06:49 )             35.8   05-20    143  |  110<H>  |  69<H>  ----------------------------<  80  4.2   |  19<L>  |  5.57<H>    Ca    8.6      20 May 2019 06:20  Phos  4.7     05-19  Mg     2.5     05-19      I&O's Detail    19 May 2019 07:01  -  20 May 2019 07:00  --------------------------------------------------------  IN:    Oral Fluid: 240 mL  Total IN: 240 mL    OUT:    Voided: 500 mL  Total OUT: 500 mL    Total NET: -260 mL      20 May 2019 07:01  -  20 May 2019 12:09  --------------------------------------------------------  IN:    Oral Fluid: 360 mL  Total IN: 360 mL    OUT:  Total OUT: 0 mL    Total NET: 360 mL    < from: CT Abdomen and Pelvis No Cont (05.19.19 @ 10:50) >  IMPRESSION:    Enlarged bilateral polycystic kidneys. No hydronephrosis. No definite   renal mass is seen however evaluation limited without intravenous   contrast.    < end of copied text >      Impression/Plan: 80yo male PMH HTN admitted with CVA s/p TPA with PKD and EZEKIEL on CKD  Neuro Follow up noted: May start anticoagulation for A-fib in 2 weeks from time of stroke (5/30/19), D/C planning to rehab.  Continue present medical management and renal Follow up  No renal mass identified on CT, renal function disallows use of IV contrast. Will continue to follow. Trend renal function  Discuss with Dr Osborn.
Subjective: no complaints.       MEDICATIONS  (STANDING):  amLODIPine   Tablet 10 milliGRAM(s) Oral daily  aspirin 325 milliGRAM(s) Oral daily  cefTRIAXone   IVPB 1 Gram(s) IV Intermittent every 24 hours  heparin  Injectable 5000 Unit(s) SubCutaneous every 12 hours  metoprolol tartrate 12.5 milliGRAM(s) Oral two times a day  simvastatin 20 milliGRAM(s) Oral at bedtime    MEDICATIONS  (PRN):  hydrALAZINE Injectable 10 milliGRAM(s) IV Push every 6 hours PRN SBP >180          T(C): 36.4 (19 @ 05:00), Max: 36.6 (19 @ 11:31)  HR: 71 (19 @ 08:56) (62 - 75)  BP: 152/85 (19 @ 08:57) (152/85 - 188/110)  RR: 18 (19 @ 08:56) (17 - 23)  SpO2: 98% (19 @ 08:56) (95% - 100%)  Wt(kg): --        I&O's Detail    17 May 2019 07:01  -  18 May 2019 07:00  --------------------------------------------------------  IN:    Oral Fluid: 100 mL  Total IN: 100 mL    OUT:    Voided: 1140 mL  Total OUT: 1140 mL    Total NET: -1040 mL               PHYSICAL EXAM:    GENERAL: comfortable.   EYES: EOMI, PERRLA, conjunctiva and sclera clear  NECK: Supple, no inc in JVP  CHEST/LUNG: Clear  HEART: S1S2  ABDOMEN: Soft, Nontender, Nondistended; Bowel sounds present  EXTREMITIES:  no edema  NEURO: no asterixis      LABS:  CBC Full  -  ( 18 May 2019 07:15 )  WBC Count : 5.74 K/uL  RBC Count : 3.78 M/uL  Hemoglobin : 11.8 g/dL  Hematocrit : 37.7 %  Platelet Count - Automated : 157 K/uL  Mean Cell Volume : 99.7 fl  Mean Cell Hemoglobin : 31.2 pg  Mean Cell Hemoglobin Concentration : 31.3 gm/dL  Auto Neutrophil # : x  Auto Lymphocyte # : x  Auto Monocyte # : x  Auto Eosinophil # : x  Auto Basophil # : x  Auto Neutrophil % : x  Auto Lymphocyte % : x  Auto Monocyte % : x  Auto Eosinophil % : x  Auto Basophil % : x        145  |  115<H>  |  56<H>  ----------------------------<  84  4.2   |  19<L>  |  4.94<H>    Ca    8.8      18 May 2019 07:15  Phos  4.2       Mg     2.4         TPro  7.3  /  Alb  3.4  /  TBili  0.7  /  DBili  x   /  AST  19  /  ALT  13  /  AlkPhos  78      PT/INR - ( 17 May 2019 03:21 )   PT: 14.8 sec;   INR: 1.31 ratio         PTT - ( 17 May 2019 03:21 )  PTT:29.6 sec  Urinalysis Basic - ( 16 May 2019 15:42 )    Color: Yellow / Appearance: Slightly Turbid / S.010 / pH: x  Gluc: x / Ketone: Negative  / Bili: Negative / Urobili: Negative mg/dL   Blood: x / Protein: 500 mg/dL / Nitrite: Positive   Leuk Esterase: Moderate / RBC: 3-5 /HPF / WBC >50   Sq Epi: x / Non Sq Epi: Negative / Bacteria: Few      Culture Results:   >=3 organisms. Probable collection contamination. ( @ 21:13)      Impression:  * EZEKIEL -- pt is unaware of CKD hx. Renal US with large polycystic kidneys s/o PKD  * CVA -- s/p tPA  * Hypertensive emergency. Improved.   * R lower pole renal mass.     Recommendations:   * Maintain -160  * Monitor Cr trend  * Obtain outpt Cr if/when available.   * Urology eval of the renal mass in v/o increased risk of RCCA in PKD
Subjective: no complaints.       MEDICATIONS  (STANDING):  amLODIPine   Tablet 10 milliGRAM(s) Oral daily  aspirin 325 milliGRAM(s) Oral daily  cefTRIAXone   IVPB 1 Gram(s) IV Intermittent every 24 hours  heparin  Injectable 5000 Unit(s) SubCutaneous every 12 hours  metoprolol tartrate 25 milliGRAM(s) Oral two times a day  simvastatin 20 milliGRAM(s) Oral at bedtime    MEDICATIONS  (PRN):  hydrALAZINE Injectable 10 milliGRAM(s) IV Push every 6 hours PRN SBP >180          T(C): 36.4 (05-19-19 @ 05:37), Max: 36.8 (05-18-19 @ 11:52)  HR: 59 (05-19-19 @ 05:37) (57 - 70)  BP: 127/77 (05-19-19 @ 05:37) (127/77 - 150/77)  RR: 15 (05-19-19 @ 05:37) (15 - 17)  SpO2: 99% (05-19-19 @ 05:37) (99% - 100%)  Wt(kg): --        I&O's Detail    18 May 2019 07:01  -  19 May 2019 07:00  --------------------------------------------------------  IN:    IV PiggyBack: 50 mL    Oral Fluid: 600 mL  Total IN: 650 mL    OUT:  Total OUT: 0 mL    Total NET: 650 mL               PHYSICAL EXAM:    GENERAL: comfortable.   EYES: EOMI, PERRLA, conjunctiva and sclera clear  NECK: Supple, no inc in JVP  CHEST/LUNG: Clear  HEART: S1S2  ABDOMEN: Soft, Nontender, Nondistended; Bowel sounds present  EXTREMITIES:  no edema  NEURO: no asterixis        LABS:  CBC Full  -  ( 19 May 2019 06:49 )  WBC Count : 5.31 K/uL  RBC Count : 3.56 M/uL  Hemoglobin : 10.9 g/dL  Hematocrit : 35.8 %  Platelet Count - Automated : 157 K/uL  Mean Cell Volume : 100.6 fl  Mean Cell Hemoglobin : 30.6 pg  Mean Cell Hemoglobin Concentration : 30.4 gm/dL  Auto Neutrophil # : x  Auto Lymphocyte # : x  Auto Monocyte # : x  Auto Eosinophil # : x  Auto Basophil # : x  Auto Neutrophil % : x  Auto Lymphocyte % : x  Auto Monocyte % : x  Auto Eosinophil % : x  Auto Basophil % : x    05-19    145  |  114<H>  |  62<H>  ----------------------------<  77  4.3   |  19<L>  |  5.27<H>    Ca    8.4<L>      19 May 2019 06:49  Phos  4.7     05-19  Mg     2.5     05-19          Culture Results:   >=3 organisms. Probable collection contamination. (05-16 @ 21:13)        Impression:  * EZEKIEL -- pt is unaware of CKD hx. Renal US with large polycystic kidneys s/o PKD  * CVA -- s/p tPA  * Hypertensive emergency. Improved.   * R lower pole renal mass.     Recommendations:   * Maintain -160  * Monitor Cr trend  * Obtain outpt Cr if/when available.   * Urology eval of the renal mass in v/o increased risk of RCCA in PKD
Patient is a 79y old  Male who presents with a chief complaint of cva sp tpa (20 May 2019 12:09), has no chest pain, no SOB    OVERNIGHT EVENTS: NONE    MEDICATIONS  (STANDING):  amLODIPine   Tablet 10 milliGRAM(s) Oral daily  aspirin 325 milliGRAM(s) Oral daily  cefTRIAXone   IVPB 1 Gram(s) IV Intermittent every 24 hours  heparin  Injectable 5000 Unit(s) SubCutaneous every 12 hours  metoprolol tartrate 25 milliGRAM(s) Oral two times a day  simvastatin 20 milliGRAM(s) Oral at bedtime    MEDICATIONS  (PRN):  hydrALAZINE Injectable 10 milliGRAM(s) IV Push every 6 hours PRN SBP >180        REVIEW OF SYSTEMS:  CONSTITUTIONAL: No fever, weight loss, or fatigue  EYES: No eye pain, visual disturbances, or discharge  ENMT:  No difficulty hearing, tinnitus, vertigo; No sinus or throat pain  NECK: No pain or stiffness  RESPIRATORY: No cough, wheezing, chills or hemoptysis; No shortness of breath  CARDIOVASCULAR: No chest pain, palpitations, dizziness, or leg swelling  GASTROINTESTINAL: No abdominal or epigastric pain. No nausea, vomiting, or hematemesis; No diarrhea or constipation. No melena or hematochezia.  GENITOURINARY: No dysuria, frequency, hematuria, or incontinence  NEUROLOGICAL: No headaches, memory loss, loss of strength, numbness, or tremors  SKIN: No itching, burning, rashes, or lesions      Vital Signs Last 24 Hrs  T(C): 36.2 (20 May 2019 11:45), Max: 36.7 (19 May 2019 23:47)  T(F): 97.2 (20 May 2019 11:45), Max: 98 (19 May 2019 23:47)  HR: 63 (20 May 2019 11:45) (57 - 72)  BP: 127/84 (20 May 2019 11:45) (127/84 - 166/81)  BP(mean): --  RR: 17 (20 May 2019 11:45) (16 - 18)  SpO2: 99% (20 May 2019 11:45) (96% - 99%)    PHYSICAL EXAM:  GENERAL: NAD, well-groomed, well-developed  HEAD:  Atraumatic, Normocephalic  EYES: EOMI, PERRLA, conjunctiva and sclera clear  ENMT: No tonsillar erythema, exudates, or enlargement; Moist mucous membranes   NECK: Supple, No JVD   NERVOUS SYSTEM:  Alert & Oriented X3, Good concentration; decreased motor Strength b/l lower extremities, good upper strength both UE; DTRs 2+ intact and symmetric  CHEST/LUNG: Clear to auscultation bilaterally; No rales, rhonchi, wheezing, or rubs  HEART: Regular rate and rhythm; No murmurs, rubs, or gallops  ABDOMEN: Soft, Nontender, Nondistended; Bowel sounds present  EXTREMITIES:  2+ Peripheral Pulses, No clubbing, cyanosis, or edema  LYMPH: No lymphadenopathy noted  SKIN: No rashes or lesions    LABS:                        10.9   5.31  )-----------( 157      ( 19 May 2019 06:49 )             35.8     05-20    143  |  110<H>  |  69<H>  ----------------------------<  80  4.2   |  19<L>  |  5.57<H>    Ca    8.6      20 May 2019 06:20  Phos  4.7     05-19  Mg     2.5     05-19         cardiac markers     CAPILLARY BLOOD GLUCOSE        Cultures    RADIOLOGY & ADDITIONAL TESTS:    Imaging Personally Reviewed:  [ ] YES  [ ] NO    Consultant(s) Notes Reviewed:  [ ] YES  [ ] NO    Care Discussed with Consultants/Other Providers [ ] YES  [ ] NO

## 2019-05-20 NOTE — PROGRESS NOTE ADULT - PROBLEM SELECTOR PLAN 4
Polycystic kidneys and R kidney abnormality showing a solid cystic mass.  -  was consulted.  - no renal mass noted on CT.  - I spoke with SHARITA Cornelius to d/c physical therapy with outpatient follow up.

## 2019-05-20 NOTE — PROGRESS NOTE ADULT - REASON FOR ADMISSION
cva sp tpa

## 2019-05-20 NOTE — DISCHARGE NOTE PROVIDER - PROVIDER TOKENS
PROVIDER:[TOKEN:[[id number]],FOLLOWUP:[1 week]],PROVIDER:[TOKEN:[[id number]],FOLLOWUP:[1-3 days]],PROVIDER:[TOKEN:[[id number]],FOLLOWUP:[1 week]],FREE:[LAST:[.],PHONE:[(   )    -],FAX:[(   )    -],ADDRESS:[follow up with primary care provider, and primary cardiologist in 3-5 days.]]

## 2019-05-20 NOTE — PROGRESS NOTE ADULT - PROBLEM SELECTOR PLAN 5
-currently stable.   - on lopressor  - follow up with cardiology as outpatient.  - CKD-5, not ACE or ARB.  Start low dose imdur, hydralazine.

## 2019-05-20 NOTE — PROGRESS NOTE ADULT - PROBLEM SELECTOR PLAN 2
Acute CVA.  -MRI brain- acute left parietal stroke with petechial hemorrhage.  -cytotoxic cerebral edema.  - on zocor, lopressor  - S/p tPA  -MRA head/neck- moderate left vertebral and right PCA stenosis.  -Echo- normal EF  -Neurology following  -Acute Rehab

## 2019-05-20 NOTE — PROGRESS NOTE ADULT - PROBLEM SELECTOR PLAN 3
AFIB: rate controlled.    - can start AC given ct head findings and recent use of TPA.   - already on ASA.   - neurology recommended starting AC 2 weeks post CVA if still required.

## 2019-05-20 NOTE — DISCHARGE NOTE PROVIDER - CARE PROVIDER_API CALL
Delaney Patricio (DO)  Neurology  [address] [place] [address]  Phone: [phone]  Fax: [phone]  Follow Up Time: 1 week    Bradford Osborn (MD)  Urology  [address] [place] [address]  Phone: [phone]  Fax: [phone]  Follow Up Time: 1-3 days    Abbe Jerry)  Internal Medicine; Nephrology  [address] [facility], [address] [place] [id number]  Phone: [phone]  Fax: [phone]  Follow Up Time: 1 week    .,   follow up with primary care provider, and primary cardiologist in 3-5 days.  Phone: (   )    -  Fax: (   )    -  Follow Up Time:

## 2019-05-21 ENCOUNTER — TRANSCRIPTION ENCOUNTER (OUTPATIENT)
Age: 79
End: 2019-05-21

## 2019-05-21 VITALS
OXYGEN SATURATION: 100 % | HEART RATE: 63 BPM | DIASTOLIC BLOOD PRESSURE: 72 MMHG | RESPIRATION RATE: 17 BRPM | SYSTOLIC BLOOD PRESSURE: 147 MMHG | TEMPERATURE: 97 F

## 2019-05-21 PROCEDURE — 99239 HOSP IP/OBS DSCHRG MGMT >30: CPT

## 2019-05-21 RX ADMIN — HEPARIN SODIUM 5000 UNIT(S): 5000 INJECTION INTRAVENOUS; SUBCUTANEOUS at 05:23

## 2019-05-21 RX ADMIN — ISOSORBIDE MONONITRATE 30 MILLIGRAM(S): 60 TABLET, EXTENDED RELEASE ORAL at 11:10

## 2019-05-21 RX ADMIN — Medication 325 MILLIGRAM(S): at 11:10

## 2019-05-21 RX ADMIN — Medication 10 MILLIGRAM(S): at 05:23

## 2019-05-21 NOTE — DISCHARGE NOTE NURSING/CASE MANAGEMENT/SOCIAL WORK - NSDCDPATPORTLINK_GEN_ALL_CORE
You can access the [facility] Patient Portal, offered by [facility], by registering with the following website: [email or web address]

## 2019-05-21 NOTE — DISCHARGE NOTE NURSING/CASE MANAGEMENT/SOCIAL WORK - NSDCPEEMAIL_GEN_ALL_CORE
[facility] for Tobacco Control email [email or web address]

## 2019-05-21 NOTE — DISCHARGE NOTE NURSING/CASE MANAGEMENT/SOCIAL WORK - NSDCPEWEB_GEN_ALL_CORE
[facility] for Tobacco Control website --- [email or web address] website --- [email or web address]

## 2019-05-24 DIAGNOSIS — I13.2 HYPERTENSIVE HEART AND CHRONIC KIDNEY DISEASE WITH HEART FAILURE AND WITH STAGE 5 CHRONIC KIDNEY DISEASE, OR END STAGE RENAL DISEASE: ICD-10-CM

## 2019-05-24 DIAGNOSIS — G93.6 CEREBRAL EDEMA: ICD-10-CM

## 2019-05-24 DIAGNOSIS — N28.89 OTHER SPECIFIED DISORDERS OF KIDNEY AND URETER: ICD-10-CM

## 2019-05-24 DIAGNOSIS — Q61.3 POLYCYSTIC KIDNEY, UNSPECIFIED: ICD-10-CM

## 2019-05-24 DIAGNOSIS — I61.8 OTHER NONTRAUMATIC INTRACEREBRAL HEMORRHAGE: ICD-10-CM

## 2019-05-24 DIAGNOSIS — I48.91 UNSPECIFIED ATRIAL FIBRILLATION: ICD-10-CM

## 2019-05-24 DIAGNOSIS — N18.5 CHRONIC KIDNEY DISEASE, STAGE 5: ICD-10-CM

## 2019-05-24 DIAGNOSIS — I69.192 FACIAL WEAKNESS FOLLOWING NONTRAUMATIC INTRACEREBRAL HEMORRHAGE: ICD-10-CM

## 2019-05-24 DIAGNOSIS — N39.0 URINARY TRACT INFECTION, SITE NOT SPECIFIED: ICD-10-CM

## 2019-05-24 DIAGNOSIS — N17.9 ACUTE KIDNEY FAILURE, UNSPECIFIED: ICD-10-CM

## 2019-05-24 DIAGNOSIS — N28.1 CYST OF KIDNEY, ACQUIRED: ICD-10-CM

## 2019-05-24 DIAGNOSIS — I16.1 HYPERTENSIVE EMERGENCY: ICD-10-CM

## 2019-05-24 DIAGNOSIS — I50.32 CHRONIC DIASTOLIC (CONGESTIVE) HEART FAILURE: ICD-10-CM

## 2019-05-24 DIAGNOSIS — I69.820 APHASIA FOLLOWING OTHER CEREBROVASCULAR DISEASE: ICD-10-CM

## 2019-06-13 ENCOUNTER — CHART COPY (OUTPATIENT)
Age: 79
End: 2019-06-13

## 2019-10-28 ENCOUNTER — INPATIENT (INPATIENT)
Facility: HOSPITAL | Age: 79
LOS: 2 days | Discharge: HOME | End: 2019-10-31
Attending: INTERNAL MEDICINE | Admitting: INTERNAL MEDICINE
Payer: MEDICARE

## 2019-10-28 VITALS
WEIGHT: 197.98 LBS | OXYGEN SATURATION: 99 % | TEMPERATURE: 98 F | HEART RATE: 70 BPM | SYSTOLIC BLOOD PRESSURE: 146 MMHG | RESPIRATION RATE: 17 BRPM | HEIGHT: 73 IN | DIASTOLIC BLOOD PRESSURE: 68 MMHG

## 2019-10-28 DIAGNOSIS — I10 ESSENTIAL (PRIMARY) HYPERTENSION: ICD-10-CM

## 2019-10-28 DIAGNOSIS — Z29.9 ENCOUNTER FOR PROPHYLACTIC MEASURES, UNSPECIFIED: ICD-10-CM

## 2019-10-28 DIAGNOSIS — E78.5 HYPERLIPIDEMIA, UNSPECIFIED: ICD-10-CM

## 2019-10-28 DIAGNOSIS — N19 UNSPECIFIED KIDNEY FAILURE: ICD-10-CM

## 2019-10-28 DIAGNOSIS — Z96.649 PRESENCE OF UNSPECIFIED ARTIFICIAL HIP JOINT: Chronic | ICD-10-CM

## 2019-10-28 DIAGNOSIS — Z95.0 PRESENCE OF CARDIAC PACEMAKER: Chronic | ICD-10-CM

## 2019-10-28 LAB
ALBUMIN SERPL ELPH-MCNC: 3.7 G/DL — SIGNIFICANT CHANGE UP (ref 3.3–5)
ALP SERPL-CCNC: 73 U/L — SIGNIFICANT CHANGE UP (ref 40–120)
ALT FLD-CCNC: 10 U/L — LOW (ref 12–78)
ANION GAP SERPL CALC-SCNC: 6 MMOL/L — SIGNIFICANT CHANGE UP (ref 5–17)
APTT BLD: 36.3 SEC — SIGNIFICANT CHANGE UP (ref 28.5–37)
AST SERPL-CCNC: 18 U/L — SIGNIFICANT CHANGE UP (ref 15–37)
BASOPHILS # BLD AUTO: 0.02 K/UL — SIGNIFICANT CHANGE UP (ref 0–0.2)
BASOPHILS NFR BLD AUTO: 0.3 % — SIGNIFICANT CHANGE UP (ref 0–2)
BILIRUB SERPL-MCNC: 0.6 MG/DL — SIGNIFICANT CHANGE UP (ref 0.2–1.2)
BLD GP AB SCN SERPL QL: SIGNIFICANT CHANGE UP
BUN SERPL-MCNC: 66 MG/DL — HIGH (ref 7–23)
CALCIUM SERPL-MCNC: 9 MG/DL — SIGNIFICANT CHANGE UP (ref 8.5–10.1)
CHLORIDE SERPL-SCNC: 111 MMOL/L — HIGH (ref 96–108)
CO2 SERPL-SCNC: 23 MMOL/L — SIGNIFICANT CHANGE UP (ref 22–31)
CREAT SERPL-MCNC: 5.12 MG/DL — HIGH (ref 0.5–1.3)
EOSINOPHIL # BLD AUTO: 0.2 K/UL — SIGNIFICANT CHANGE UP (ref 0–0.5)
EOSINOPHIL NFR BLD AUTO: 3.4 % — SIGNIFICANT CHANGE UP (ref 0–6)
GLUCOSE SERPL-MCNC: 90 MG/DL — SIGNIFICANT CHANGE UP (ref 70–99)
HCT VFR BLD CALC: 31.5 % — LOW (ref 39–50)
HGB BLD-MCNC: 9.8 G/DL — LOW (ref 13–17)
IMM GRANULOCYTES NFR BLD AUTO: 0.2 % — SIGNIFICANT CHANGE UP (ref 0–1.5)
INR BLD: 1.65 RATIO — HIGH (ref 0.88–1.16)
LYMPHOCYTES # BLD AUTO: 1.16 K/UL — SIGNIFICANT CHANGE UP (ref 1–3.3)
LYMPHOCYTES # BLD AUTO: 20 % — SIGNIFICANT CHANGE UP (ref 13–44)
MCHC RBC-ENTMCNC: 31 PG — SIGNIFICANT CHANGE UP (ref 27–34)
MCHC RBC-ENTMCNC: 31.1 GM/DL — LOW (ref 32–36)
MCV RBC AUTO: 99.7 FL — SIGNIFICANT CHANGE UP (ref 80–100)
MONOCYTES # BLD AUTO: 0.54 K/UL — SIGNIFICANT CHANGE UP (ref 0–0.9)
MONOCYTES NFR BLD AUTO: 9.3 % — SIGNIFICANT CHANGE UP (ref 2–14)
NEUTROPHILS # BLD AUTO: 3.87 K/UL — SIGNIFICANT CHANGE UP (ref 1.8–7.4)
NEUTROPHILS NFR BLD AUTO: 66.8 % — SIGNIFICANT CHANGE UP (ref 43–77)
NRBC # BLD: 0 /100 WBCS — SIGNIFICANT CHANGE UP (ref 0–0)
PLATELET # BLD AUTO: 160 K/UL — SIGNIFICANT CHANGE UP (ref 150–400)
POTASSIUM SERPL-MCNC: 5.1 MMOL/L — SIGNIFICANT CHANGE UP (ref 3.5–5.3)
POTASSIUM SERPL-SCNC: 5.1 MMOL/L — SIGNIFICANT CHANGE UP (ref 3.5–5.3)
PROT SERPL-MCNC: 8.4 GM/DL — HIGH (ref 6–8.3)
PROTHROM AB SERPL-ACNC: 18.8 SEC — HIGH (ref 10–12.9)
RBC # BLD: 3.16 M/UL — LOW (ref 4.2–5.8)
RBC # FLD: 14.5 % — SIGNIFICANT CHANGE UP (ref 10.3–14.5)
SODIUM SERPL-SCNC: 140 MMOL/L — SIGNIFICANT CHANGE UP (ref 135–145)
WBC # BLD: 5.8 K/UL — SIGNIFICANT CHANGE UP (ref 3.8–10.5)
WBC # FLD AUTO: 5.8 K/UL — SIGNIFICANT CHANGE UP (ref 3.8–10.5)

## 2019-10-28 PROCEDURE — 71045 X-RAY EXAM CHEST 1 VIEW: CPT | Mod: 26

## 2019-10-28 PROCEDURE — 77001 FLUOROGUIDE FOR VEIN DEVICE: CPT | Mod: 26

## 2019-10-28 PROCEDURE — 93010 ELECTROCARDIOGRAM REPORT: CPT

## 2019-10-28 PROCEDURE — 76937 US GUIDE VASCULAR ACCESS: CPT | Mod: 26

## 2019-10-28 PROCEDURE — 99285 EMERGENCY DEPT VISIT HI MDM: CPT

## 2019-10-28 PROCEDURE — 99221 1ST HOSP IP/OBS SF/LOW 40: CPT | Mod: 25

## 2019-10-28 PROCEDURE — 99223 1ST HOSP IP/OBS HIGH 75: CPT | Mod: AI

## 2019-10-28 PROCEDURE — 36556 INSERT NON-TUNNEL CV CATH: CPT

## 2019-10-28 RX ORDER — HYDRALAZINE HCL 50 MG
10 TABLET ORAL EVERY 12 HOURS
Refills: 0 | Status: DISCONTINUED | OUTPATIENT
Start: 2019-10-28 | End: 2019-10-31

## 2019-10-28 RX ORDER — AMLODIPINE BESYLATE 2.5 MG/1
10 TABLET ORAL DAILY
Refills: 0 | Status: DISCONTINUED | OUTPATIENT
Start: 2019-10-28 | End: 2019-10-31

## 2019-10-28 RX ORDER — ISOSORBIDE MONONITRATE 60 MG/1
30 TABLET, EXTENDED RELEASE ORAL DAILY
Refills: 0 | Status: DISCONTINUED | OUTPATIENT
Start: 2019-10-28 | End: 2019-10-31

## 2019-10-28 RX ORDER — SIMVASTATIN 20 MG/1
20 TABLET, FILM COATED ORAL AT BEDTIME
Refills: 0 | Status: DISCONTINUED | OUTPATIENT
Start: 2019-10-28 | End: 2019-10-31

## 2019-10-28 RX ORDER — CHLORHEXIDINE GLUCONATE 213 G/1000ML
1 SOLUTION TOPICAL
Refills: 0 | Status: DISCONTINUED | OUTPATIENT
Start: 2019-10-28 | End: 2019-10-31

## 2019-10-28 RX ORDER — METOPROLOL TARTRATE 50 MG
25 TABLET ORAL
Refills: 0 | Status: DISCONTINUED | OUTPATIENT
Start: 2019-10-28 | End: 2019-10-31

## 2019-10-28 RX ORDER — ASPIRIN/CALCIUM CARB/MAGNESIUM 324 MG
325 TABLET ORAL DAILY
Refills: 0 | Status: DISCONTINUED | OUTPATIENT
Start: 2019-10-28 | End: 2019-10-29

## 2019-10-28 RX ADMIN — Medication 25 MILLIGRAM(S): at 17:41

## 2019-10-28 RX ADMIN — Medication 10 MILLIGRAM(S): at 17:41

## 2019-10-28 NOTE — H&P ADULT - PROBLEM SELECTOR PLAN 1
Admit to med- surg  Renal consult- seen by Dr. Jerry, HD via andrews catheter today and tomorrow;   Perm cath Wednesday;   Discharge planning to [facility] [place]

## 2019-10-28 NOTE — CONSULT NOTE ADULT - PROBLEM SELECTOR RECOMMENDATION 9
-procedure to be performed today  -labs, vitals and labs are acceptable for procedure  -consent obtained from patient and also d/w spouse

## 2019-10-28 NOTE — ED ADULT TRIAGE NOTE - CHIEF COMPLAINT QUOTE
as per spouse " My  was referred to MD Jerry by MD Kennedy for initial treatment of dialysis." hx htn, renal failure, CVA 5/19 no deficits. pt has no complaints at the moment. Pacemaker placed in September.

## 2019-10-28 NOTE — H&P ADULT - NSHPPHYSICALEXAM_GEN_ALL_CORE
ICU Vital Signs Last 24 Hrs  T(C): 36.7 (28 Oct 2019 10:34), Max: 36.7 (28 Oct 2019 10:34)  T(F): 98 (28 Oct 2019 10:34), Max: 98 (28 Oct 2019 10:34)  HR: 70 (28 Oct 2019 10:34) (70 - 70)  BP: 146/68 (28 Oct 2019 10:34) (146/68 - 146/68)  BP(mean): --  ABP: --  ABP(mean): --  RR: 17 (28 Oct 2019 10:34) (17 - 17)  SpO2: 99% (28 Oct 2019 10:34) (99% - 99%)

## 2019-10-28 NOTE — H&P ADULT - HISTORY OF PRESENT ILLNESS
79 yr old M with PMH HTN, HLD, CVA, PPM sent in to ED by his nephrologist Dr. Kennedy for worsening uremic sx,  Hx of CKD 5; being followed as an outpatient. He has been having increasing fatigue, weakness and anorexia and unintentional progressive weight loss x several months. Discussions with Dr. Kennedy, patient and family to initiate HD this week. Patient has no AV access. Pt denies urinary changes, denies pruritis, denies back pain.   In ED BUN/ Cr 66/5.12

## 2019-10-28 NOTE — ED ADULT NURSE NOTE - OBJECTIVE STATEMENT
Pt was sent to the Emergency Department for evaluation of his kidney disease, pt was informed by Dr. Jerry that he needs to be evaluated for dialysis. Pt has no pain, no distress, no discomfort noted.

## 2019-10-28 NOTE — H&P ADULT - NSICDXPASTMEDICALHX_GEN_ALL_CORE_FT
PAST MEDICAL HISTORY:  Cerebrovascular accident (CVA)     Hyperlipidemia, unspecified hyperlipidemia type     Hypertension     Renal insufficiency

## 2019-10-28 NOTE — ED ADULT NURSE REASSESSMENT NOTE - NS ED NURSE REASSESS COMMENT FT1
Report given to receiving RN Lindsay Brice, pts history, current condition and reason for admission discussed, safety concerns addressed and reviewed, pt currently in stable condition, IV flushes for patency and site shows no signs or symptoms of infiltrate, dressing is clean dry and intact, pt is aware of plan of care. Pt education deemed successful at time of report after patient demonstrates successful teach back for proficiency.

## 2019-10-28 NOTE — H&P ADULT - ASSESSMENT
79 yr old M with PMH HTN, HLD, CVA, PPM with renal failure    IMPROVE VTE Individual Risk Assessment          RISK                                                          Points    [  ] Previous VTE                                                3    [  ] Thrombophilia                                             2    [  ] Lower limb paralysis                                    2        (unable to hold up >15 seconds)      [  ] Current Cancer                                             2         (within 6 months)    [  ] Immobilization > 24 hrs                              1    [  ] ICU/CCU stay > 24 hours                            1    [X  ] Age > 60                                                    1    IMPROVE VTE Score ___1______    Low risk 0-1: [  ] Early ambulation                          [ X ] Intermittent Compression Device    High Risk 2-12: [  ] Heparin 5,000 units SC Q8 H                              [  ] Heparin 5,000 units SC Q 12 H                              [  ] LMWH 40 mg SC daily (CrCl > 30 ml)

## 2019-10-28 NOTE — PROCEDURE NOTE - ADDITIONAL PROCEDURE DETAILS
pt s/p temporary dialysis catheter placement inserted into right IJ via US guidance and proper placement of tip at the cavoatrial junction via fluoro.  Pt tolerated procedure well.  Hemostasis achieved.  Minimal blood loss  -Catheter can be accessed  -will convert to tunneled before discharge if needed

## 2019-10-28 NOTE — CONSULT NOTE ADULT - SUBJECTIVE AND OBJECTIVE BOX
[facility] NEPHROLOGY SERVICES, [facility]  NEPHROLOGY AND HYPERTENSION  [address]  [address]  [place] [address]  [phone]    MD BLANCA SCHMIDT MD ANDREY GONCHARUK, MD MADHU KORRAPATI, MD YELENA ROSENBERG, MD BINNY KOSHY, MD CHRISTOPHER CAPUTO, MD KARENA RODRIGUEZ MD      HPI Patient sent in by Dr. Kennedy for worsening uremic sx; Hx of CKD 5; being followed as an outpatient. He has been having increasing fatigue, weakness and anorexia. Discussions with Dr. Kennedy, patient and family to initiate HD this week. Patient has no AV access.        PAST MEDICAL & SURGICAL HISTORY:  Renal insufficiency  Hyperlipidemia, unspecified hyperlipidemia type  Hypertension  History of pacemaker    FAMILY HISTORY:    Allergies    No Known Allergies    Intolerances      Home Medications:  amLODIPine 10 mg oral tablet: 1 tab(s) orally once a day (20 May 2019 17:14)  aspirin 325 mg oral tablet: 1 tab(s) orally once a day (20 May 2019 17:14)  isosorbide mononitrate 30 mg oral tablet, extended release: 1 tab(s) orally once a day (20 May 2019 17:14)  metoprolol tartrate 25 mg oral tablet: 1 tab(s) orally 2 times a day (20 May 2019 17:14)  simvastatin 20 mg oral tablet: 1 tab(s) orally once a day (at bedtime) (20 May 2019 17:14)    MEDICATIONS  (STANDING):    MEDICATIONS  (PRN):    Vital Signs Last 24 Hrs  T(C): 36.7 (28 Oct 2019 10:34), Max: 36.7 (28 Oct 2019 10:34)  T(F): 98 (28 Oct 2019 10:34), Max: 98 (28 Oct 2019 10:34)  HR: 70 (28 Oct 2019 10:34) (70 - 70)  BP: 146/68 (28 Oct 2019 10:34) (146/68 - 146/68)  BP(mean): --  RR: 17 (28 Oct 2019 10:34) (17 - 17)  SpO2: 99% (28 Oct 2019 10:34) (99% - 99%)    Daily Height in cm: 185.42 (28 Oct 2019 10:34)    Daily     CAPILLARY BLOOD GLUCOSE        PHYSICAL EXAM:      T(C): 36.7 (10-28-19 @ 10:34), Max: 36.7 (10-28-19 @ 10:34)  HR: 70 (10-28-19 @ 10:34) (70 - 70)  BP: 146/68 (10-28-19 @ 10:34) (146/68 - 146/68)  RR: 17 (10-28-19 @ 10:34) (17 - 17)  SpO2: 99% (10-28-19 @ 10:34) (99% - 99%)  Wt(kg): --  Respiratory: clear anteriorly, decreased BS at bases  Cardiovascular: S1 S2  Gastrointestinal: soft NT ND +BS  Extremities:  no edema              10-28    140  |  111<H>  |  66<H>  ----------------------------<  90  5.1   |  23  |  5.12<H>    Ca    9.0      28 Oct 2019 12:08    TPro  8.4<H>  /  Alb  3.7  /  TBili  0.6  /  DBili  x   /  AST  18  /  ALT  10<L>  /  AlkPhos  73  10-28                          9.8    5.80  )-----------( 160      ( 28 Oct 2019 12:08 )             31.5     Creatinine Trend: 5.12<--          Assessment   CKD 6; ESRD; uremic sx;      Plan  HD via andrews catheter today and tomorrow;   Perm cath Wednesday;   Discharge planning to [facility] HD center ;      Abbe Jerry MD

## 2019-10-28 NOTE — H&P ADULT - NSHPSOCIALHISTORY_GEN_ALL_CORE
, lives with wife  tobacco use at age 10, 11. No recent tobacco use  denies etoh or illicit drug use  retired, previously worked as a

## 2019-10-28 NOTE — CONSULT NOTE ADULT - SUBJECTIVE AND OBJECTIVE BOX
Patient is a 79y old  Male who presents with a chief complaint of Kidney disease (28 Oct 2019 14:18)    IR consulted for temporary dialysis catheter placement.    79 yr old M with PMH HTN, HLD, CVA, PPM sent in to ED by his nephrologist Dr. Kennedy for worsening uremic sx,  Hx of CKD 5; being followed as an outpatient. He has been having increasing fatigue, weakness and anorexia and unintentional progressive weight loss x several months. Discussions with Dr. Kennedy, patient and family to initiate HD this week. Patient has no AV access. Pt denies urinary changes, denies pruritis, denies back pain.   In ED BUN/ Cr 66/5.12         PAST MEDICAL & SURGICAL HISTORY:  Cerebrovascular accident (CVA)  Renal insufficiency  Hyperlipidemia, unspecified hyperlipidemia type  Hypertension  S/P hip replacement: BL 2010, 2011  History of pacemaker      Allergies    No Known Allergies    Intolerances        MEDICATIONS  (STANDING):  amLODIPine   Tablet 10 milliGRAM(s) Oral daily  aspirin 325 milliGRAM(s) Oral daily  hydrALAZINE 10 milliGRAM(s) Oral every 12 hours  isosorbide   mononitrate ER Tablet (IMDUR) 30 milliGRAM(s) Oral daily  metoprolol tartrate 25 milliGRAM(s) Oral two times a day  simvastatin 20 milliGRAM(s) Oral at bedtime    MEDICATIONS  (PRN):        SOCIAL HISTORY:    FAMILY HISTORY:        PHYSICAL EXAM:    Vital Signs Last 24 Hrs  T(C): 36.7 (28 Oct 2019 10:34), Max: 36.7 (28 Oct 2019 10:34)  T(F): 98 (28 Oct 2019 10:34), Max: 98 (28 Oct 2019 10:34)  HR: 70 (28 Oct 2019 10:34) (70 - 70)  BP: 146/68 (28 Oct 2019 10:34) (146/68 - 146/68)  BP(mean): --  RR: 17 (28 Oct 2019 10:34) (17 - 17)  SpO2: 99% (28 Oct 2019 10:34) (99% - 99%)    General:  no distress  Lungs:  CTAB  Cardiovascular:  good S1, S2,   Abdomen:  Soft, non-tender, non-distended,   Extremities:  no calf tenderness/swelling b/l  Neuro/Psych:  Alert    LABS:                        9.8    5.80  )-----------( 160      ( 28 Oct 2019 12:08 )             31.5     10-28    140  |  111<H>  |  66<H>  ----------------------------<  90  5.1   |  23  |  5.12<H>    Ca    9.0      28 Oct 2019 12:08    TPro  8.4<H>  /  Alb  3.7  /  TBili  0.6  /  DBili  x   /  AST  18  /  ALT  10<L>  /  AlkPhos  73  10-28    PT/INR - ( 28 Oct 2019 12:08 )   PT: 18.8 sec;   INR: 1.65 ratio         PTT - ( 28 Oct 2019 12:08 )  PTT:36.3 sec      RADIOLOGY & ADDITIONAL STUDIES:  < from: Xray Chest 1 View-PORTABLE IMMEDIATE (10.28.19 @ 11:21) >  INTERPRETATION:  CLINICAL INFORMATION: Preop    EXAM: AP view of chest performed on 10/28/2019    COMPARISON: 5/16/2019.    FINDINGS:  Left chest pacer is present with its leads overlying the right atrium and   ventricle.  The lungs are clear. No pleural effusion or pneumothorax.  The cardiac silhouette is magnified on this projection.  The osseous structures are unremarkable.    IMPRESSION:  Clearlungs    < end of copied text >

## 2019-10-29 LAB
ANION GAP SERPL CALC-SCNC: 7 MMOL/L — SIGNIFICANT CHANGE UP (ref 5–17)
BUN SERPL-MCNC: 49 MG/DL — HIGH (ref 7–23)
CALCIUM SERPL-MCNC: 8.8 MG/DL — SIGNIFICANT CHANGE UP (ref 8.5–10.1)
CHLORIDE SERPL-SCNC: 109 MMOL/L — HIGH (ref 96–108)
CO2 SERPL-SCNC: 23 MMOL/L — SIGNIFICANT CHANGE UP (ref 22–31)
CREAT SERPL-MCNC: 4.27 MG/DL — HIGH (ref 0.5–1.3)
GLUCOSE SERPL-MCNC: 80 MG/DL — SIGNIFICANT CHANGE UP (ref 70–99)
HBV CORE AB SER-ACNC: SIGNIFICANT CHANGE UP
HBV SURFACE AB SER-ACNC: <3 MIU/ML — LOW
HBV SURFACE AG SER-ACNC: SIGNIFICANT CHANGE UP
HCT VFR BLD CALC: 30.9 % — LOW (ref 39–50)
HCV AB S/CO SERPL IA: 0.11 S/CO — SIGNIFICANT CHANGE UP (ref 0–0.99)
HCV AB SERPL-IMP: SIGNIFICANT CHANGE UP
HGB BLD-MCNC: 9.7 G/DL — LOW (ref 13–17)
MCHC RBC-ENTMCNC: 30.8 PG — SIGNIFICANT CHANGE UP (ref 27–34)
MCHC RBC-ENTMCNC: 31.4 GM/DL — LOW (ref 32–36)
MCV RBC AUTO: 98.1 FL — SIGNIFICANT CHANGE UP (ref 80–100)
NRBC # BLD: 0 /100 WBCS — SIGNIFICANT CHANGE UP (ref 0–0)
PLATELET # BLD AUTO: 150 K/UL — SIGNIFICANT CHANGE UP (ref 150–400)
POTASSIUM SERPL-MCNC: 4.6 MMOL/L — SIGNIFICANT CHANGE UP (ref 3.5–5.3)
POTASSIUM SERPL-SCNC: 4.6 MMOL/L — SIGNIFICANT CHANGE UP (ref 3.5–5.3)
RBC # BLD: 3.15 M/UL — LOW (ref 4.2–5.8)
RBC # FLD: 14.3 % — SIGNIFICANT CHANGE UP (ref 10.3–14.5)
SODIUM SERPL-SCNC: 139 MMOL/L — SIGNIFICANT CHANGE UP (ref 135–145)
WBC # BLD: 5.34 K/UL — SIGNIFICANT CHANGE UP (ref 3.8–10.5)
WBC # FLD AUTO: 5.34 K/UL — SIGNIFICANT CHANGE UP (ref 3.8–10.5)

## 2019-10-29 PROCEDURE — 99232 SBSQ HOSP IP/OBS MODERATE 35: CPT

## 2019-10-29 RX ADMIN — SIMVASTATIN 20 MILLIGRAM(S): 20 TABLET, FILM COATED ORAL at 21:46

## 2019-10-29 RX ADMIN — CHLORHEXIDINE GLUCONATE 1 APPLICATION(S): 213 SOLUTION TOPICAL at 05:58

## 2019-10-29 RX ADMIN — Medication 10 MILLIGRAM(S): at 17:55

## 2019-10-29 RX ADMIN — SIMVASTATIN 20 MILLIGRAM(S): 20 TABLET, FILM COATED ORAL at 01:08

## 2019-10-29 RX ADMIN — Medication 25 MILLIGRAM(S): at 06:51

## 2019-10-29 RX ADMIN — Medication 25 MILLIGRAM(S): at 17:55

## 2019-10-30 LAB
ANION GAP SERPL CALC-SCNC: 8 MMOL/L — SIGNIFICANT CHANGE UP (ref 5–17)
BUN SERPL-MCNC: 29 MG/DL — HIGH (ref 7–23)
CALCIUM SERPL-MCNC: 8.8 MG/DL — SIGNIFICANT CHANGE UP (ref 8.5–10.1)
CHLORIDE SERPL-SCNC: 106 MMOL/L — SIGNIFICANT CHANGE UP (ref 96–108)
CO2 SERPL-SCNC: 27 MMOL/L — SIGNIFICANT CHANGE UP (ref 22–31)
CREAT SERPL-MCNC: 3.33 MG/DL — HIGH (ref 0.5–1.3)
GLUCOSE SERPL-MCNC: 81 MG/DL — SIGNIFICANT CHANGE UP (ref 70–99)
HBV CORE IGM SER-ACNC: SIGNIFICANT CHANGE UP
HCT VFR BLD CALC: 32 % — LOW (ref 39–50)
HGB BLD-MCNC: 10.2 G/DL — LOW (ref 13–17)
INR BLD: 1.45 RATIO — HIGH (ref 0.88–1.16)
MCHC RBC-ENTMCNC: 31 PG — SIGNIFICANT CHANGE UP (ref 27–34)
MCHC RBC-ENTMCNC: 31.9 GM/DL — LOW (ref 32–36)
MCV RBC AUTO: 97.3 FL — SIGNIFICANT CHANGE UP (ref 80–100)
NRBC # BLD: 0 /100 WBCS — SIGNIFICANT CHANGE UP (ref 0–0)
PLATELET # BLD AUTO: 147 K/UL — LOW (ref 150–400)
POTASSIUM SERPL-MCNC: 4 MMOL/L — SIGNIFICANT CHANGE UP (ref 3.5–5.3)
POTASSIUM SERPL-SCNC: 4 MMOL/L — SIGNIFICANT CHANGE UP (ref 3.5–5.3)
PROTHROM AB SERPL-ACNC: 16.4 SEC — HIGH (ref 10–12.9)
RBC # BLD: 3.29 M/UL — LOW (ref 4.2–5.8)
RBC # FLD: 13.8 % — SIGNIFICANT CHANGE UP (ref 10.3–14.5)
SODIUM SERPL-SCNC: 141 MMOL/L — SIGNIFICANT CHANGE UP (ref 135–145)
WBC # BLD: 4.53 K/UL — SIGNIFICANT CHANGE UP (ref 3.8–10.5)
WBC # FLD AUTO: 4.53 K/UL — SIGNIFICANT CHANGE UP (ref 3.8–10.5)

## 2019-10-30 PROCEDURE — 77001 FLUOROGUIDE FOR VEIN DEVICE: CPT | Mod: 26

## 2019-10-30 PROCEDURE — 36558 INSERT TUNNELED CV CATH: CPT

## 2019-10-30 PROCEDURE — 99232 SBSQ HOSP IP/OBS MODERATE 35: CPT

## 2019-10-30 RX ORDER — ONDANSETRON 8 MG/1
4 TABLET, FILM COATED ORAL ONCE
Refills: 0 | Status: COMPLETED | OUTPATIENT
Start: 2019-10-30 | End: 2019-10-30

## 2019-10-30 RX ADMIN — Medication 10 MILLIGRAM(S): at 17:05

## 2019-10-30 RX ADMIN — ONDANSETRON 4 MILLIGRAM(S): 8 TABLET, FILM COATED ORAL at 12:25

## 2019-10-30 RX ADMIN — CHLORHEXIDINE GLUCONATE 1 APPLICATION(S): 213 SOLUTION TOPICAL at 06:24

## 2019-10-30 RX ADMIN — SIMVASTATIN 20 MILLIGRAM(S): 20 TABLET, FILM COATED ORAL at 21:35

## 2019-10-30 RX ADMIN — Medication 25 MILLIGRAM(S): at 17:06

## 2019-10-30 NOTE — PROGRESS NOTE ADULT - PROBLEM SELECTOR PLAN 1
-Will convert to tunneled when nephrology approves
ESRD requiring HD   Nephrology on board.   s/p permacath placement.   Discharge planning.
ESRD requiring HD   s/p andrews catheter placement, tentative Perm cath for Wednesday;

## 2019-10-30 NOTE — PROGRESS NOTE ADULT - ASSESSMENT
80 yo male found to have CKD 6; ESRD; uremic sx;  s/p right IJ vascath placement.    Catheter working optimally
79 yr old M with PMH HTN, HLD, CVA, PPM with renal failure    IMPROVE VTE Individual Risk Assessment          RISK                                                          Points    [  ] Previous VTE                                                3    [  ] Thrombophilia                                             2    [  ] Lower limb paralysis                                    2        (unable to hold up >15 seconds)      [  ] Current Cancer                                             2         (within 6 months)    [  ] Immobilization > 24 hrs                              1    [  ] ICU/CCU stay > 24 hours                            1    [X  ] Age > 60                                                    1    IMPROVE VTE Score ___1______    Low risk 0-1: [  ] Early ambulation                          [ X ] Intermittent Compression Device    High Risk 2-12: [  ] Heparin 5,000 units SC Q8 H                              [  ] Heparin 5,000 units SC Q 12 H                              [  ] LMWH 40 mg SC daily (CrCl > 30 ml)
79 yr old M with PMH HTN, HLD, CVA, PPM with renal failure    IMPROVE VTE Individual Risk Assessment          RISK                                                          Points    [  ] Previous VTE                                                3    [  ] Thrombophilia                                             2    [  ] Lower limb paralysis                                    2        (unable to hold up >15 seconds)      [  ] Current Cancer                                             2         (within 6 months)    [  ] Immobilization > 24 hrs                              1    [  ] ICU/CCU stay > 24 hours                            1    [X  ] Age > 60                                                    1    IMPROVE VTE Score ___1______    Low risk 0-1: [  ] Early ambulation                          [ X ] Intermittent Compression Device    High Risk 2-12: [  ] Heparin 5,000 units SC Q8 H                              [  ] Heparin 5,000 units SC Q 12 H                              [  ] LMWH 40 mg SC daily (CrCl > 30 ml)

## 2019-10-30 NOTE — PROGRESS NOTE ADULT - PROBLEM SELECTOR PROBLEM 1
Renal failure, unspecified chronicity

## 2019-10-30 NOTE — CHART NOTE - NSCHARTNOTEFT_GEN_A_CORE
Vascular & Interventional Radiology Pre-Procedure Note    Procedure Name: _Conversion of temporary to tunneled dialysis catheter placement______    HPI: 79y Male with ___HTN, HLD, PPM, CVA found to have CKD now needs HD____    Allergies:   Medications (Abx/Cardiac/Anticoagulation/Blood Products)    hydrALAZINE: 10 milliGRAM(s) Oral (10-29 @ 17:55)  metoprolol tartrate: 25 milliGRAM(s) Oral (10-29 @ 17:55)    Data:    T(C): 36.7  HR: 69  BP: 120/90  RR: 17  SpO2: 100%    Exam  General: _A&Ox3______  Chest: ___CTAB____  Abdomen: __Soft, NT, ND_____  Extremities: ___b/l edema____    -WBC 4.53 / HgB 10.2 / Hct 32.0 / Plt 147  -Na 141 / Cl 106 / BUN 29 / Glucose 81  -K 4.0 / CO2 27 / Cr 3.33  -ALT -- / Alk Phos -- / T.Bili --  -INR1.45      Plan:   -79y Male presents for _____Conversion of temporary to tunneled dialysis catheter placement.  Pt has a h/o __HTN, HLD, PPM, CVA found to have CKD now needs HD  -meds, labs and vitals reviewed  -Will obtain consent from patient

## 2019-10-31 ENCOUNTER — TRANSCRIPTION ENCOUNTER (OUTPATIENT)
Age: 79
End: 2019-10-31

## 2019-10-31 VITALS — SYSTOLIC BLOOD PRESSURE: 121 MMHG | HEART RATE: 69 BPM | DIASTOLIC BLOOD PRESSURE: 74 MMHG | TEMPERATURE: 98 F

## 2019-10-31 PROCEDURE — 99239 HOSP IP/OBS DSCHRG MGMT >30: CPT

## 2019-10-31 RX ADMIN — Medication 10 MILLIGRAM(S): at 06:44

## 2019-10-31 RX ADMIN — CHLORHEXIDINE GLUCONATE 1 APPLICATION(S): 213 SOLUTION TOPICAL at 06:45

## 2019-10-31 RX ADMIN — AMLODIPINE BESYLATE 10 MILLIGRAM(S): 2.5 TABLET ORAL at 06:44

## 2019-10-31 RX ADMIN — ISOSORBIDE MONONITRATE 30 MILLIGRAM(S): 60 TABLET, EXTENDED RELEASE ORAL at 12:28

## 2019-10-31 RX ADMIN — Medication 25 MILLIGRAM(S): at 06:44

## 2019-10-31 NOTE — PROGRESS NOTE ADULT - SUBJECTIVE AND OBJECTIVE BOX
Patient s/p right IJ vascath.  Pt had some discomfort to site last night, today is better.  Insertion site is CDI, there is no swelling to right neck, chest or upper extremity.  As per dialysis nursing notes, the catheter worked well.  Vitals stable, no new labs      PHYSICAL EXAM:    Vital Signs Last 24 Hrs  T(C): 36.6 (29 Oct 2019 11:52), Max: 36.9 (28 Oct 2019 21:05)  T(F): 97.9 (29 Oct 2019 11:52), Max: 98.5 (28 Oct 2019 21:05)  HR: 69 (29 Oct 2019 11:52) (64 - 70)  BP: 138/85 (29 Oct 2019 11:52) (114/66 - 145/83)  BP(mean): --  RR: 17 (29 Oct 2019 11:52) (16 - 20)  SpO2: 98% (29 Oct 2019 11:52) (98% - 100%)    General:  A & O x3, NAD  Neck/chest:  as above  Lungs:  CTAB  Cardiovascular:  good S1, S2,   Abdomen:  Soft, non-tender, non-distended, normoactive bowel sounds, no HSM  Extremities:  no calf swelling/tenderness b/l        LABS:                        9.8    5.80  )-----------( 160      ( 28 Oct 2019 12:08 )             31.5     10-28    140  |  111<H>  |  66<H>  ----------------------------<  90  5.1   |  23  |  5.12<H>    Ca    9.0      28 Oct 2019 12:08    TPro  8.4<H>  /  Alb  3.7  /  TBili  0.6  /  DBili  x   /  AST  18  /  ALT  10<L>  /  AlkPhos  73  10-28    PT/INR - ( 28 Oct 2019 12:08 )   PT: 18.8 sec;   INR: 1.65 ratio         PTT - ( 28 Oct 2019 12:08 )  PTT:36.3 sec      RADIOLOGY & ADDITIONAL STUDIES:
[facility] NEPHROLOGY SERVICES, [facility]  NEPHROLOGY AND HYPERTENSION  [address]  [address]  [place] [address]  [phone]    MD BLANCA SCHMIDT, MD LORRAINE IWLLINGHAM, MD CHRISTIAN BELL, MD TAMY WELDON, MD KARENA RODRIGUEZ MD          Patient feels well no complaints today.    MEDICATIONS  (STANDING):  amLODIPine   Tablet 10 milliGRAM(s) Oral daily  aspirin 325 milliGRAM(s) Oral daily  chlorhexidine 4% Liquid 1 Application(s) Topical <User Schedule>  hydrALAZINE 10 milliGRAM(s) Oral every 12 hours  isosorbide   mononitrate ER Tablet (IMDUR) 30 milliGRAM(s) Oral daily  metoprolol tartrate 25 milliGRAM(s) Oral two times a day  simvastatin 20 milliGRAM(s) Oral at bedtime    MEDICATIONS  (PRN):      10-28-19 @ 07:01  -  10-29-19 @ 07:00  --------------------------------------------------------  IN: 0 mL / OUT: 0 mL / NET: 0 mL      PHYSICAL EXAM:      T(C): 36.2 (10-29-19 @ 15:54), Max: 36.9 (10-28-19 @ 21:05)  HR: 69 (10-29-19 @ 15:54) (64 - 70)  BP: 146/91 (10-29-19 @ 15:54) (114/66 - 146/91)  RR: 17 (10-29-19 @ 15:54) (16 - 20)  SpO2: 100% (10-29-19 @ 15:54) (98% - 100%)  Wt(kg): --  Respiratory: clear anteriorly, decreased BS at bases  Cardiovascular: S1 S2  Gastrointestinal: soft NT ND +BS  Extremities: tr  edema                                    9.7    5.34  )-----------( 150      ( 29 Oct 2019 13:14 )             30.9     10-29    139  |  109<H>  |  49<H>  ----------------------------<  80  4.6   |  23  |  4.27<H>    Ca    8.8      29 Oct 2019 13:14    TPro  8.4<H>  /  Alb  3.7  /  TBili  0.6  /  DBili  x   /  AST  18  /  ALT  10<L>  /  AlkPhos  73  10-28      LIVER FUNCTIONS - ( 28 Oct 2019 12:08 )  Alb: 3.7 g/dL / Pro: 8.4 gm/dL / ALK PHOS: 73 U/L / ALT: 10 U/L / AST: 18 U/L / GGT: x           Creatinine Trend: 4.27<--, 5.12<--      Assessment   CKD 6; new to HD;     Plan:  Perm cath tomorrow;   HD tomorrow ( 3);   Discharge planning [facility] HD center;     Abbe Jerry MD
[facility] NEPHROLOGY SERVICES, [facility]  NEPHROLOGY AND HYPERTENSION  [address]  [address]  [place] [address]  [phone]    MD BLANCA SCHMIDT, MD MARY LOU DOVER, MD LORRAINE TUTTLE, MD CHRISTIAN BELL, MD TAMY WELDON, MD KARENA RODRIGUEZ MD          Patient feels well post perm cath    MEDICATIONS  (STANDING):  amLODIPine   Tablet 10 milliGRAM(s) Oral daily  chlorhexidine 4% Liquid 1 Application(s) Topical <User Schedule>  hydrALAZINE 10 milliGRAM(s) Oral every 12 hours  isosorbide   mononitrate ER Tablet (IMDUR) 30 milliGRAM(s) Oral daily  metoprolol tartrate 25 milliGRAM(s) Oral two times a day  simvastatin 20 milliGRAM(s) Oral at bedtime    MEDICATIONS  (PRN):      10-29-19 @ 07:01  -  10-30-19 @ 07:00  --------------------------------------------------------  IN: 360 mL / OUT: 0 mL / NET: 360 mL    10-30-19 @ 07:01  -  10-30-19 @ 19:14  --------------------------------------------------------  IN: 0 mL / OUT: 0 mL / NET: 0 mL      PHYSICAL EXAM:      T(C): 35.9 (10-30-19 @ 18:00), Max: 37 (10-29-19 @ 23:05)  HR: 69 (10-30-19 @ 18:00) (61 - 69)  BP: 126/69 (10-30-19 @ 18:00) (120/90 - 161/116)  RR: 18 (10-30-19 @ 18:00) (16 - 20)  SpO2: 98% (10-30-19 @ 18:00) (97% - 100%)  Wt(kg): --  Respiratory: clear anteriorly, decreased BS at bases  Cardiovascular: S1 S2  Gastrointestinal: soft NT ND +BS  Extremities: tr  edema                                    10.2   4.53  )-----------( 147      ( 30 Oct 2019 06:28 )             32.0     10-30    141  |  106  |  29<H>  ----------------------------<  81  4.0   |  27  |  3.33<H>    Ca    8.8      30 Oct 2019 06:28          Creatinine Trend: 3.33<--, 4.27<--, 5.12<--      Assessment   CKD progression to ESRD.    Plan:  Maintenance HD today;   Outpatient slot verified, [facility] HD center, [place], [place] and Sat;   Stable from renal view.  Outpatient access placement.    Abbe Jerry MD
Patient is a 79y old  Male who presents with a chief complaint of Kidney disease (29 Oct 2019 12:02)      INTERVAL HPI/ OVERNIGHT EVENTS: Pt was seen and examined at bedside today, No significant overnight events, pt denies any complaints.      MEDICATIONS  (STANDING):  amLODIPine   Tablet 10 milliGRAM(s) Oral daily  aspirin 325 milliGRAM(s) Oral daily  chlorhexidine 4% Liquid 1 Application(s) Topical <User Schedule>  hydrALAZINE 10 milliGRAM(s) Oral every 12 hours  isosorbide   mononitrate ER Tablet (IMDUR) 30 milliGRAM(s) Oral daily  metoprolol tartrate 25 milliGRAM(s) Oral two times a day  simvastatin 20 milliGRAM(s) Oral at bedtime    MEDICATIONS  (PRN):      Allergies    No Known Allergies    Intolerances        REVIEW OF SYSTEMS:    Unable to examine due to [ ] Encephalopathy [ ] Advanced Dementia [ ] Expressive Aphasia [ ] Non-verbal patient    CONSTITUTIONAL: No fever, NO generalized weakness/Fatigue, No weight loss  EYES: No eye pain, visual disturbances, or discharge  ENMT:  No difficulty hearing, tinnitus, vertigo; No sinus or throat pain  NECK: No pain or stiffness  RESPIRATORY: No shortness of breath,  cough, wheezing, sputum or hemoptysis   CARDIOVASCULAR: No chest pain, palpitations, or leg swelling  GASTROINTESTINAL: No abdominal pain. No nausea, vomiting, diarrhea or constipation. No melena or hematochezia.  GENITOURINARY: No dysuria, frequency, hematuria, or incontinence  NEUROLOGICAL: No headaches, Dizziness, memory loss, loss of strength, numbness, or tremors  SKIN: No itching, burning, rashes, or lesions   MUSCULOSKELETAL: No joint pain or swelling; No muscle, back, or extremity pain  PSYCHIATRIC: No depression, anxiety, mood swings, or difficulty sleeping  HEME/LYMPH: No easy bruising, or bleeding gums      Vital Signs Last 24 Hrs  T(C): 36.6 (29 Oct 2019 12:14), Max: 36.9 (28 Oct 2019 21:05)  T(F): 97.9 (29 Oct 2019 12:14), Max: 98.5 (28 Oct 2019 21:05)  HR: 69 (29 Oct 2019 12:10) (64 - 70)  BP: 140/81 (29 Oct 2019 12:10) (114/66 - 145/83)  BP(mean): --  RR: 16 (29 Oct 2019 12:10) (16 - 20)  SpO2: 99% (29 Oct 2019 12:10) (98% - 100%)    PHYSICAL EXAM:  GENERAL: NAD, well-developed, well-groomed  HEAD:  Atraumatic, Normocephalic  EYES: conjunctiva and sclera clear  ENMT: Moist mucous membranes  NECK: Supple, No JVD, Normal thyroid  CHEST/LUNG: Clear to Auscultation bilaterally; No rales, rhonchi, wheezing, or rubs  HEART: Regular rate and rhythm; No murmurs, rubs, or gallops  ABDOMEN: Soft, Nontender, Nondistended; Bowel sounds present  EXTREMITIES:  2+ Peripheral Pulses, No clubbing, cyanosis, or edema  SKIN: No rashes or lesions  NERVOUS SYSTEM:  Alert & Oriented X3, Good concentration; Motor Strength 5/5 B/L upper and lower extremities    LABS:                        9.7    5.34  )-----------( 150      ( 29 Oct 2019 13:14 )             30.9     10-29    139  |  109<H>  |  49<H>  ----------------------------<  80  4.6   |  23  |  4.27<H>    Ca    8.8      29 Oct 2019 13:14    TPro  8.4<H>  /  Alb  3.7  /  TBili  0.6  /  DBili  x   /  AST  18  /  ALT  10<L>  /  AlkPhos  73  10-28    PT/INR - ( 28 Oct 2019 12:08 )   PT: 18.8 sec;   INR: 1.65 ratio         PTT - ( 28 Oct 2019 12:08 )  PTT:36.3 sec    CAPILLARY BLOOD GLUCOSE              RADIOLOGY & ADDITIONAL TESTS:          Imaging Personally Reviewed:  [ ] YES  [ ] NO    Consultant(s) Notes Reviewed:  [x ] YES  [ ] NO    Care Discussed with Consultants/Other Providers [x ] YES  [ ] NO
Patient is a 79y old  Male who presents with a chief complaint of Kidney disease (30 Oct 2019 12:12)      INTERVAL HPI/ OVERNIGHT EVENTS: Pt was seen and examined at bedside today, No significant overnight events, pt denies any complaints.     MEDICATIONS  (STANDING):  amLODIPine   Tablet 10 milliGRAM(s) Oral daily  chlorhexidine 4% Liquid 1 Application(s) Topical <User Schedule>  hydrALAZINE 10 milliGRAM(s) Oral every 12 hours  isosorbide   mononitrate ER Tablet (IMDUR) 30 milliGRAM(s) Oral daily  metoprolol tartrate 25 milliGRAM(s) Oral two times a day  ondansetron Injectable 4 milliGRAM(s) IV Push once  simvastatin 20 milliGRAM(s) Oral at bedtime    MEDICATIONS  (PRN):      Allergies    No Known Allergies    Intolerances        REVIEW OF SYSTEMS:    Unable to examine due to [ ] Encephalopathy [ ] Advanced Dementia [ ] Expressive Aphasia [ ] Non-verbal patient    CONSTITUTIONAL: No fever, NO generalized weakness/Fatigue, No weight loss  EYES: No eye pain, visual disturbances, or discharge  ENMT:  No difficulty hearing, tinnitus, vertigo; No sinus or throat pain  NECK: No pain or stiffness  RESPIRATORY: No shortness of breath,  cough, wheezing, sputum or hemoptysis   CARDIOVASCULAR: No chest pain, palpitations, or leg swelling  GASTROINTESTINAL: No abdominal pain. No nausea, vomiting, diarrhea or constipation. No melena or hematochezia.  GENITOURINARY: No dysuria, frequency, hematuria, or incontinence  NEUROLOGICAL: No headaches, Dizziness, memory loss, loss of strength, numbness, or tremors  SKIN: No itching, burning, rashes, or lesions   MUSCULOSKELETAL: No joint pain or swelling; No muscle, back, or extremity pain  PSYCHIATRIC: No depression, anxiety, mood swings, or difficulty sleeping  HEME/LYMPH: No easy bruising, or bleeding gums      Vital Signs Last 24 Hrs  T(C): 36.7 (30 Oct 2019 05:35), Max: 37 (29 Oct 2019 23:05)  T(F): 98 (30 Oct 2019 05:35), Max: 98.6 (29 Oct 2019 23:05)  HR: 69 (30 Oct 2019 05:35) (60 - 69)  BP: 120/90 (30 Oct 2019 05:35) (120/90 - 156/89)  BP(mean): --  RR: 17 (30 Oct 2019 05:35) (16 - 17)  SpO2: 100% (30 Oct 2019 05:35) (99% - 100%)    PHYSICAL EXAM:  GENERAL: NAD, well-developed, well-groomed  HEAD:  Atraumatic, Normocephalic  EYES: conjunctiva and sclera clear  ENMT: Moist mucous membranes  NECK: Supple, No JVD, Normal thyroid  CHEST/LUNG: Clear to Auscultation bilaterally; No rales, rhonchi, wheezing, or rubs  HEART: Regular rate and rhythm; No murmurs, rubs, or gallops  ABDOMEN: Soft, Nontender, Nondistended; Bowel sounds present  EXTREMITIES:  2+ Peripheral Pulses, No clubbing, cyanosis, or edema  SKIN: No rashes or lesions  NERVOUS SYSTEM:  Alert & Oriented X3, Good concentration; Motor Strength 5/5 B/L upper and lower extremities    LABS:                        10.2   4.53  )-----------( 147      ( 30 Oct 2019 06:28 )             32.0     10-30    141  |  106  |  29<H>  ----------------------------<  81  4.0   |  27  |  3.33<H>    Ca    8.8      30 Oct 2019 06:28      PT/INR - ( 30 Oct 2019 06:28 )   PT: 16.4 sec;   INR: 1.45 ratio             CAPILLARY BLOOD GLUCOSE              RADIOLOGY & ADDITIONAL TESTS:          Imaging Personally Reviewed:  [ ] YES  [ ] NO    Consultant(s) Notes Reviewed:  [x ] YES  [ ] NO    Care Discussed with Consultants/Other Providers [x ] YES  [ ] NO
Patient s/p conversion of temporary to tunneled dialysis catheter placement    Operators: Nisa MA, Margarita Kaye MD    Findings:  A 14.5fr x 19cm tip to cuff dialysis catheter placement after removal over a wire of a temporary dialysis catheter, (removed in its entirety without incident).  Catheter tip confirmed at the cavo-atrial junction via fluoroscopy.  Hemostasis achieved.  Pt tolerated procedure well.  Vitals remained stable    Complications: None.    Blood loss:  minimal    Anesthesiologist present      ASSESSMENT:  78 yo male s/p conversion of temporary to tunneled dialysis catheter placement.  Pt found to have CKD.    PLAN:  -Catheter can be accessed
Subjective: comfortable.  No complaints.        MEDICATIONS  (STANDING):  amLODIPine   Tablet 10 milliGRAM(s) Oral daily  chlorhexidine 4% Liquid 1 Application(s) Topical <User Schedule>  hydrALAZINE 10 milliGRAM(s) Oral every 12 hours  isosorbide   mononitrate ER Tablet (IMDUR) 30 milliGRAM(s) Oral daily  metoprolol tartrate 25 milliGRAM(s) Oral two times a day  simvastatin 20 milliGRAM(s) Oral at bedtime    MEDICATIONS  (PRN):          T(C): 36.8 (10-31-19 @ 04:48), Max: 37.2 (10-30-19 @ 23:18)  HR: 70 (10-31-19 @ 04:48) (61 - 72)  BP: 139/75 (10-31-19 @ 04:48) (115/70 - 161/116)  RR: 17 (10-31-19 @ 04:48) (16 - 20)  SpO2: 100% (10-31-19 @ 04:48) (97% - 100%)  Wt(kg): --        I&O's Detail    30 Oct 2019 07:01  -  31 Oct 2019 07:00  --------------------------------------------------------  IN:  Total IN: 0 mL    OUT:    Voided: 650 mL  Total OUT: 650 mL    Total NET: -650 mL               PHYSICAL EXAM:    GENERAL: NAD  EYES: EOMI, PERRLA, conjunctiva and sclera clear  NECK: Supple, no inc in JVP  CHEST/LUNG: Clear  HEART: S1S2  ABDOMEN: Soft, Nontender, Nondistended; Bowel sounds present  EXTREMITIES:  no edema  NEURO: no asterixis  R chest PC.       LABS:  CBC Full  -  ( 30 Oct 2019 06:28 )  WBC Count : 4.53 K/uL  RBC Count : 3.29 M/uL  Hemoglobin : 10.2 g/dL  Hematocrit : 32.0 %  Platelet Count - Automated : 147 K/uL  Mean Cell Volume : 97.3 fl  Mean Cell Hemoglobin : 31.0 pg  Mean Cell Hemoglobin Concentration : 31.9 gm/dL  Auto Neutrophil # : x  Auto Lymphocyte # : x  Auto Monocyte # : x  Auto Eosinophil # : x  Auto Basophil # : x  Auto Neutrophil % : x  Auto Lymphocyte % : x  Auto Monocyte % : x  Auto Eosinophil % : x  Auto Basophil % : x    10-30    141  |  106  |  29<H>  ----------------------------<  81  4.0   |  27  |  3.33<H>    Ca    8.8      30 Oct 2019 06:28      PT/INR - ( 30 Oct 2019 06:28 )   PT: 16.4 sec;   INR: 1.45 ratio           Impression:  * New HD start during this admission for progressive CKD to ESRD.   * Dialyzing via tunneled cath now.     Recommendations:   * No objection to dc if outpt HD arrangement are in place  * Next dialysis tomorrow as Rxed.   * AV access as outpt.

## 2019-10-31 NOTE — DISCHARGE NOTE NURSING/CASE MANAGEMENT/SOCIAL WORK - PATIENT PORTAL LINK FT
You can access the FollowMyHealth Patient Portal offered by [facility] by registering at the following website: [email or web address]. By joining [facility]’s FollowMyHealth portal, you will also be able to view your health information using other applications (apps) compatible with our system.

## 2019-10-31 NOTE — DISCHARGE NOTE PROVIDER - HOSPITAL COURSE
79 yr old M with PMH HTN, HLD, CVA, PPM with renal failure            Problem/Plan - 1:    ·  Problem: Renal failure, unspecified chronicity.  Plan: ESRD requiring HD     Nephrology on board.     s/p permacath placement.     Discharge planning.         Problem/Plan - 2:    ·  Problem: Essential hypertension.  Plan: cont w/ same mgmt.         Problem/Plan - 3:    ·  Problem: Hyperlipidemia, unspecified hyperlipidemia type.  Plan: cont simvastatin.                  Attending Attestation:     40 minutes spent on total dc encounter; more than 50% of the visit was spent counseling and/or coordinating care by the attending physician.

## 2019-10-31 NOTE — DISCHARGE NOTE PROVIDER - CARE PROVIDER_API CALL
your primary doctor,   Phone: (   )    -  Fax: (   )    -  Follow Up Time:     Abbe Jerry)  Internal Medicine; Nephrology  [address] [facility], [address] [place] [address] [id number]  Phone: [phone]  Fax: [phone]  Follow Up Time:

## 2019-10-31 NOTE — DISCHARGE NOTE PROVIDER - NSDCCPCAREPLAN_GEN_ALL_CORE_FT
PRINCIPAL DISCHARGE DIAGNOSIS  Diagnosis: Renal failure, unspecified chronicity  Assessment and Plan of Treatment: f//u with renal, start dialysis

## 2019-10-31 NOTE — PROGRESS NOTE ADULT - REASON FOR ADMISSION
Kidney disease

## 2019-10-31 NOTE — DISCHARGE NOTE NURSING/CASE MANAGEMENT/SOCIAL WORK - NSDCPEPTSTRK_GEN_ALL_CORE
Stroke warning signs and symptoms/Signs and symptoms of stroke/Stroke education booklet/Call 911 for stroke/Stroke support groups for patients, families, and friends/Need for follow up after discharge/Risk factors for stroke/Prescribed medications

## 2019-10-31 NOTE — DISCHARGE NOTE PROVIDER - PROVIDER TOKENS
FREE:[LAST:[your primary doctor],PHONE:[(   )    -],FAX:[(   )    -]],PROVIDER:[TOKEN:[[id number]]]
(4) rarely moist

## 2019-10-31 NOTE — DISCHARGE NOTE PROVIDER - NSDCMRMEDTOKEN_GEN_ALL_CORE_FT
amLODIPine 10 mg oral tablet: 1 tab(s) orally once a day  aspirin 325 mg oral tablet: 1 tab(s) orally once a day  hydrALAZINE 10 mg oral tablet: 1 tab(s) orally 2 times a day   isosorbide mononitrate 30 mg oral tablet, extended release: 1 tab(s) orally once a day  metoprolol tartrate 25 mg oral tablet: 1 tab(s) orally 2 times a day  simvastatin 20 mg oral tablet: 1 tab(s) orally once a day (at bedtime)

## 2019-11-04 DIAGNOSIS — I12.0 HYPERTENSIVE CHRONIC KIDNEY DISEASE WITH STAGE 5 CHRONIC KIDNEY DISEASE OR END STAGE RENAL DISEASE: ICD-10-CM

## 2019-11-04 DIAGNOSIS — E78.5 HYPERLIPIDEMIA, UNSPECIFIED: ICD-10-CM

## 2019-11-04 DIAGNOSIS — N18.6 END STAGE RENAL DISEASE: ICD-10-CM

## 2019-11-04 DIAGNOSIS — Z86.73 PERSONAL HISTORY OF TRANSIENT ISCHEMIC ATTACK (TIA), AND CEREBRAL INFARCTION WITHOUT RESIDUAL DEFICITS: ICD-10-CM

## 2019-11-04 DIAGNOSIS — Z95.0 PRESENCE OF CARDIAC PACEMAKER: ICD-10-CM

## 2019-11-04 DIAGNOSIS — Z96.643 PRESENCE OF ARTIFICIAL HIP JOINT, BILATERAL: ICD-10-CM

## 2019-11-04 DIAGNOSIS — Z87.891 PERSONAL HISTORY OF NICOTINE DEPENDENCE: ICD-10-CM

## 2020-01-01 NOTE — PHYSICAL THERAPY INITIAL EVALUATION ADULT - DIAGNOSIS, PT EVAL
VSS, maintaining temperature in open crib, voiding and having meconium stools, infant is having bilirubin levels drawn every 6 hours as ordered, does not appear jaundice at this time, mother is bottle/formula feeding her infant without difficulty, encouraged frequent feedings, verbalized understanding.   weakness

## 2021-02-05 NOTE — ED ADULT TRIAGE NOTE - CHIEF COMPLAINT QUOTE
Per EMS pt was in [employer] with spouse and stated not feeling well about 12 felt weak brought down to floor no head injury, since then pt is aphasic and not following commands Normal vision: sees adequately in most situations; can see medication labels, newsprint

## 2021-08-02 NOTE — H&P ADULT - RESPIRATORY AND THORAX
details… Nsaids Pregnancy And Lactation Text: These medications are considered safe up to 30 weeks gestation. It is excreted in breast milk.

## 2021-11-09 NOTE — PATIENT PROFILE ADULT - FUNCTIONAL SCREEN CURRENT LEVEL: COMMUNICATION, MLM
The patient had quit smoking last November however when the metastatic cancer was found she resume smoking.  Currently she is smoking 0.5-1 pack per day.  Smoking cessation was once again discussed.  The patient would like to quit at this time.  She had quit successfully with Chantix in the past and wants to try this again.  A prescription for Chantix was sent to her pharmacy.  Side effects discussed.  She is to call if she has any further questions or concerns.  She was strongly encouraged to quit.   0 = understands/communicates without difficulty

## 2022-01-01 ENCOUNTER — INPATIENT (INPATIENT)
Facility: HOSPITAL | Age: 82
LOS: 7 days | End: 2022-03-16
Attending: STUDENT IN AN ORGANIZED HEALTH CARE EDUCATION/TRAINING PROGRAM | Admitting: STUDENT IN AN ORGANIZED HEALTH CARE EDUCATION/TRAINING PROGRAM
Payer: MEDICARE

## 2022-01-01 ENCOUNTER — TRANSCRIPTION ENCOUNTER (OUTPATIENT)
Age: 82
End: 2022-01-01

## 2022-01-01 ENCOUNTER — INPATIENT (INPATIENT)
Facility: HOSPITAL | Age: 82
LOS: 7 days | Discharge: HOME HEALTH | End: 2022-02-25
Attending: STUDENT IN AN ORGANIZED HEALTH CARE EDUCATION/TRAINING PROGRAM | Admitting: STUDENT IN AN ORGANIZED HEALTH CARE EDUCATION/TRAINING PROGRAM
Payer: MEDICARE

## 2022-01-01 ENCOUNTER — APPOINTMENT (OUTPATIENT)
Dept: SURGERY | Facility: CLINIC | Age: 82
End: 2022-01-01

## 2022-01-01 VITALS
OXYGEN SATURATION: 96 % | DIASTOLIC BLOOD PRESSURE: 72 MMHG | RESPIRATION RATE: 19 BRPM | HEART RATE: 72 BPM | HEIGHT: 73 IN | TEMPERATURE: 100 F | WEIGHT: 210.1 LBS | SYSTOLIC BLOOD PRESSURE: 145 MMHG

## 2022-01-01 VITALS
OXYGEN SATURATION: 97 % | HEART RATE: 69 BPM | SYSTOLIC BLOOD PRESSURE: 112 MMHG | TEMPERATURE: 98 F | RESPIRATION RATE: 18 BRPM | DIASTOLIC BLOOD PRESSURE: 69 MMHG

## 2022-01-01 VITALS
SYSTOLIC BLOOD PRESSURE: 140 MMHG | HEIGHT: 73 IN | WEIGHT: 199.96 LBS | DIASTOLIC BLOOD PRESSURE: 95 MMHG | HEART RATE: 83 BPM

## 2022-01-01 VITALS
SYSTOLIC BLOOD PRESSURE: 81 MMHG | OXYGEN SATURATION: 65 % | HEART RATE: 69 BPM | RESPIRATION RATE: 26 BRPM | DIASTOLIC BLOOD PRESSURE: 49 MMHG

## 2022-01-01 DIAGNOSIS — E78.5 HYPERLIPIDEMIA, UNSPECIFIED: ICD-10-CM

## 2022-01-01 DIAGNOSIS — Z95.0 PRESENCE OF CARDIAC PACEMAKER: ICD-10-CM

## 2022-01-01 DIAGNOSIS — Z79.82 LONG TERM (CURRENT) USE OF ASPIRIN: ICD-10-CM

## 2022-01-01 DIAGNOSIS — N18.6 END STAGE RENAL DISEASE: ICD-10-CM

## 2022-01-01 DIAGNOSIS — K85.10 BILIARY ACUTE PANCREATITIS WITHOUT NECROSIS OR INFECTION: ICD-10-CM

## 2022-01-01 DIAGNOSIS — R18.8 OTHER ASCITES: ICD-10-CM

## 2022-01-01 DIAGNOSIS — I50.32 CHRONIC DIASTOLIC (CONGESTIVE) HEART FAILURE: ICD-10-CM

## 2022-01-01 DIAGNOSIS — Z20.822 CONTACT WITH AND (SUSPECTED) EXPOSURE TO COVID-19: ICD-10-CM

## 2022-01-01 DIAGNOSIS — Z96.649 PRESENCE OF UNSPECIFIED ARTIFICIAL HIP JOINT: Chronic | ICD-10-CM

## 2022-01-01 DIAGNOSIS — Z99.2 DEPENDENCE ON RENAL DIALYSIS: ICD-10-CM

## 2022-01-01 DIAGNOSIS — E44.0 MODERATE PROTEIN-CALORIE MALNUTRITION: ICD-10-CM

## 2022-01-01 DIAGNOSIS — B95.4 OTHER STREPTOCOCCUS AS THE CAUSE OF DISEASES CLASSIFIED ELSEWHERE: ICD-10-CM

## 2022-01-01 DIAGNOSIS — Z95.0 PRESENCE OF CARDIAC PACEMAKER: Chronic | ICD-10-CM

## 2022-01-01 DIAGNOSIS — K80.62 CALCULUS OF GALLBLADDER AND BILE DUCT WITH ACUTE CHOLECYSTITIS WITHOUT OBSTRUCTION: ICD-10-CM

## 2022-01-01 DIAGNOSIS — I69.320 APHASIA FOLLOWING CEREBRAL INFARCTION: ICD-10-CM

## 2022-01-01 DIAGNOSIS — Z96.643 PRESENCE OF ARTIFICIAL HIP JOINT, BILATERAL: ICD-10-CM

## 2022-01-01 DIAGNOSIS — Z79.01 LONG TERM (CURRENT) USE OF ANTICOAGULANTS: ICD-10-CM

## 2022-01-01 DIAGNOSIS — I12.0 HYPERTENSIVE CHRONIC KIDNEY DISEASE WITH STAGE 5 CHRONIC KIDNEY DISEASE OR END STAGE RENAL DISEASE: ICD-10-CM

## 2022-01-01 DIAGNOSIS — E87.8 OTHER DISORDERS OF ELECTROLYTE AND FLUID BALANCE, NOT ELSEWHERE CLASSIFIED: ICD-10-CM

## 2022-01-01 DIAGNOSIS — I47.2 VENTRICULAR TACHYCARDIA: ICD-10-CM

## 2022-01-01 DIAGNOSIS — I48.20 CHRONIC ATRIAL FIBRILLATION, UNSPECIFIED: ICD-10-CM

## 2022-01-01 DIAGNOSIS — J98.11 ATELECTASIS: ICD-10-CM

## 2022-01-01 LAB
-  CEFTRIAXONE: 0.38 — SIGNIFICANT CHANGE UP
-  CLINDAMYCIN: SIGNIFICANT CHANGE UP
-  ERYTHROMYCIN: SIGNIFICANT CHANGE UP
-  PENICILLIN: SIGNIFICANT CHANGE UP
-  VANCOMYCIN: SIGNIFICANT CHANGE UP
ALBUMIN SERPL ELPH-MCNC: 2 G/DL — LOW (ref 3.3–5)
ALBUMIN SERPL ELPH-MCNC: 2 G/DL — LOW (ref 3.3–5)
ALBUMIN SERPL ELPH-MCNC: 2.1 G/DL — LOW (ref 3.3–5)
ALBUMIN SERPL ELPH-MCNC: 2.3 G/DL — LOW (ref 3.3–5)
ALBUMIN SERPL ELPH-MCNC: 2.3 G/DL — LOW (ref 3.3–5)
ALBUMIN SERPL ELPH-MCNC: 2.4 G/DL — LOW (ref 3.3–5)
ALBUMIN SERPL ELPH-MCNC: 2.4 G/DL — LOW (ref 3.3–5)
ALBUMIN SERPL ELPH-MCNC: 2.5 G/DL — LOW (ref 3.3–5)
ALBUMIN SERPL ELPH-MCNC: 2.5 G/DL — LOW (ref 3.3–5)
ALBUMIN SERPL ELPH-MCNC: 2.9 G/DL — LOW (ref 3.3–5)
ALBUMIN SERPL ELPH-MCNC: 2.9 G/DL — LOW (ref 3.3–5)
ALBUMIN SERPL ELPH-MCNC: 3.1 G/DL — LOW (ref 3.3–5)
ALBUMIN SERPL ELPH-MCNC: 3.3 G/DL — SIGNIFICANT CHANGE UP (ref 3.3–5)
ALP SERPL-CCNC: 103 U/L — SIGNIFICANT CHANGE UP (ref 40–120)
ALP SERPL-CCNC: 110 U/L — SIGNIFICANT CHANGE UP (ref 40–120)
ALP SERPL-CCNC: 129 U/L — HIGH (ref 40–120)
ALP SERPL-CCNC: 57 U/L — SIGNIFICANT CHANGE UP (ref 40–120)
ALP SERPL-CCNC: 62 U/L — SIGNIFICANT CHANGE UP (ref 40–120)
ALP SERPL-CCNC: 67 U/L — SIGNIFICANT CHANGE UP (ref 40–120)
ALP SERPL-CCNC: 69 U/L — SIGNIFICANT CHANGE UP (ref 40–120)
ALP SERPL-CCNC: 75 U/L — SIGNIFICANT CHANGE UP (ref 40–120)
ALP SERPL-CCNC: 76 U/L — SIGNIFICANT CHANGE UP (ref 40–120)
ALP SERPL-CCNC: 83 U/L — SIGNIFICANT CHANGE UP (ref 40–120)
ALP SERPL-CCNC: 90 U/L — SIGNIFICANT CHANGE UP (ref 40–120)
ALP SERPL-CCNC: 98 U/L — SIGNIFICANT CHANGE UP (ref 40–120)
ALP SERPL-CCNC: 99 U/L — SIGNIFICANT CHANGE UP (ref 40–120)
ALT FLD-CCNC: 10 U/L — LOW (ref 12–78)
ALT FLD-CCNC: 10 U/L — LOW (ref 12–78)
ALT FLD-CCNC: 11 U/L — LOW (ref 12–78)
ALT FLD-CCNC: 15 U/L — SIGNIFICANT CHANGE UP (ref 12–78)
ALT FLD-CCNC: 16 U/L — SIGNIFICANT CHANGE UP (ref 12–78)
ALT FLD-CCNC: 18 U/L — SIGNIFICANT CHANGE UP (ref 12–78)
ALT FLD-CCNC: 22 U/L — SIGNIFICANT CHANGE UP (ref 12–78)
ALT FLD-CCNC: 29 U/L — SIGNIFICANT CHANGE UP (ref 12–78)
ALT FLD-CCNC: 32 U/L — SIGNIFICANT CHANGE UP (ref 12–78)
ALT FLD-CCNC: 39 U/L — SIGNIFICANT CHANGE UP (ref 12–78)
ALT FLD-CCNC: 49 U/L — SIGNIFICANT CHANGE UP (ref 12–78)
ALT FLD-CCNC: 9 U/L — LOW (ref 12–78)
ALT FLD-CCNC: 9 U/L — LOW (ref 12–78)
ANION GAP SERPL CALC-SCNC: 10 MMOL/L — SIGNIFICANT CHANGE UP (ref 5–17)
ANION GAP SERPL CALC-SCNC: 11 MMOL/L — SIGNIFICANT CHANGE UP (ref 5–17)
ANION GAP SERPL CALC-SCNC: 12 MMOL/L — SIGNIFICANT CHANGE UP (ref 5–17)
ANION GAP SERPL CALC-SCNC: 13 MMOL/L — SIGNIFICANT CHANGE UP (ref 5–17)
ANION GAP SERPL CALC-SCNC: 15 MMOL/L — SIGNIFICANT CHANGE UP (ref 5–17)
ANION GAP SERPL CALC-SCNC: 15 MMOL/L — SIGNIFICANT CHANGE UP (ref 5–17)
ANION GAP SERPL CALC-SCNC: 17 MMOL/L — SIGNIFICANT CHANGE UP (ref 5–17)
ANION GAP SERPL CALC-SCNC: 17 MMOL/L — SIGNIFICANT CHANGE UP (ref 5–17)
ANION GAP SERPL CALC-SCNC: 6 MMOL/L — SIGNIFICANT CHANGE UP (ref 5–17)
ANION GAP SERPL CALC-SCNC: 8 MMOL/L — SIGNIFICANT CHANGE UP (ref 5–17)
ANISOCYTOSIS BLD QL: SLIGHT — SIGNIFICANT CHANGE UP
APPEARANCE UR: ABNORMAL
APTT BLD: 32.3 SEC — SIGNIFICANT CHANGE UP (ref 27.5–35.5)
APTT BLD: 34.3 SEC — SIGNIFICANT CHANGE UP (ref 27.5–35.5)
APTT BLD: 35.2 SEC — SIGNIFICANT CHANGE UP (ref 27.5–35.5)
APTT BLD: 49.3 SEC — HIGH (ref 27.5–35.5)
APTT BLD: 70.5 SEC — HIGH (ref 27.5–35.5)
APTT BLD: 75.5 SEC — HIGH (ref 27.5–35.5)
APTT BLD: 76.2 SEC — HIGH (ref 27.5–35.5)
AST SERPL-CCNC: 16 U/L — SIGNIFICANT CHANGE UP (ref 15–37)
AST SERPL-CCNC: 20 U/L — SIGNIFICANT CHANGE UP (ref 15–37)
AST SERPL-CCNC: 21 U/L — SIGNIFICANT CHANGE UP (ref 15–37)
AST SERPL-CCNC: 33 U/L — SIGNIFICANT CHANGE UP (ref 15–37)
AST SERPL-CCNC: 34 U/L — SIGNIFICANT CHANGE UP (ref 15–37)
AST SERPL-CCNC: 35 U/L — SIGNIFICANT CHANGE UP (ref 15–37)
AST SERPL-CCNC: 38 U/L — HIGH (ref 15–37)
AST SERPL-CCNC: 43 U/L — HIGH (ref 15–37)
AST SERPL-CCNC: 50 U/L — HIGH (ref 15–37)
AST SERPL-CCNC: 51 U/L — HIGH (ref 15–37)
AST SERPL-CCNC: 59 U/L — HIGH (ref 15–37)
AST SERPL-CCNC: 68 U/L — HIGH (ref 15–37)
AST SERPL-CCNC: 84 U/L — HIGH (ref 15–37)
BACTERIA # UR AUTO: ABNORMAL
BASE EXCESS BLDA CALC-SCNC: -11.5 MMOL/L — LOW (ref -2–3)
BASOPHILS # BLD AUTO: 0 K/UL — SIGNIFICANT CHANGE UP (ref 0–0.2)
BASOPHILS # BLD AUTO: 0 K/UL — SIGNIFICANT CHANGE UP (ref 0–0.2)
BASOPHILS # BLD AUTO: 0.02 K/UL — SIGNIFICANT CHANGE UP (ref 0–0.2)
BASOPHILS NFR BLD AUTO: 0 % — SIGNIFICANT CHANGE UP (ref 0–2)
BASOPHILS NFR BLD AUTO: 0 % — SIGNIFICANT CHANGE UP (ref 0–2)
BASOPHILS NFR BLD AUTO: 0.1 % — SIGNIFICANT CHANGE UP (ref 0–2)
BASOPHILS NFR BLD AUTO: 0.1 % — SIGNIFICANT CHANGE UP (ref 0–2)
BASOPHILS NFR BLD AUTO: 0.3 % — SIGNIFICANT CHANGE UP (ref 0–2)
BILIRUB DIRECT SERPL-MCNC: 0.7 MG/DL — HIGH (ref 0–0.3)
BILIRUB INDIRECT FLD-MCNC: 0.8 MG/DL — SIGNIFICANT CHANGE UP (ref 0.2–1)
BILIRUB SERPL-MCNC: 0.5 MG/DL — SIGNIFICANT CHANGE UP (ref 0.2–1.2)
BILIRUB SERPL-MCNC: 0.5 MG/DL — SIGNIFICANT CHANGE UP (ref 0.2–1.2)
BILIRUB SERPL-MCNC: 0.7 MG/DL — SIGNIFICANT CHANGE UP (ref 0.2–1.2)
BILIRUB SERPL-MCNC: 0.7 MG/DL — SIGNIFICANT CHANGE UP (ref 0.2–1.2)
BILIRUB SERPL-MCNC: 0.8 MG/DL — SIGNIFICANT CHANGE UP (ref 0.2–1.2)
BILIRUB SERPL-MCNC: 0.8 MG/DL — SIGNIFICANT CHANGE UP (ref 0.2–1.2)
BILIRUB SERPL-MCNC: 0.9 MG/DL — SIGNIFICANT CHANGE UP (ref 0.2–1.2)
BILIRUB SERPL-MCNC: 1 MG/DL — SIGNIFICANT CHANGE UP (ref 0.2–1.2)
BILIRUB SERPL-MCNC: 1.3 MG/DL — HIGH (ref 0.2–1.2)
BILIRUB SERPL-MCNC: 1.3 MG/DL — HIGH (ref 0.2–1.2)
BILIRUB SERPL-MCNC: 1.5 MG/DL — HIGH (ref 0.2–1.2)
BILIRUB UR-MCNC: NEGATIVE — SIGNIFICANT CHANGE UP
BLD GP AB SCN SERPL QL: SIGNIFICANT CHANGE UP
BLD GP AB SCN SERPL QL: SIGNIFICANT CHANGE UP
BLOOD GAS COMMENTS: SIGNIFICANT CHANGE UP
BLOOD GAS COMMENTS: SIGNIFICANT CHANGE UP
BLOOD GAS SOURCE: SIGNIFICANT CHANGE UP
BUN SERPL-MCNC: 24 MG/DL — HIGH (ref 7–23)
BUN SERPL-MCNC: 34 MG/DL — HIGH (ref 7–23)
BUN SERPL-MCNC: 39 MG/DL — HIGH (ref 7–23)
BUN SERPL-MCNC: 39 MG/DL — HIGH (ref 7–23)
BUN SERPL-MCNC: 42 MG/DL — HIGH (ref 7–23)
BUN SERPL-MCNC: 49 MG/DL — HIGH (ref 7–23)
BUN SERPL-MCNC: 51 MG/DL — HIGH (ref 7–23)
BUN SERPL-MCNC: 52 MG/DL — HIGH (ref 7–23)
BUN SERPL-MCNC: 55 MG/DL — HIGH (ref 7–23)
BUN SERPL-MCNC: 57 MG/DL — HIGH (ref 7–23)
BUN SERPL-MCNC: 57 MG/DL — HIGH (ref 7–23)
BUN SERPL-MCNC: 59 MG/DL — HIGH (ref 7–23)
BUN SERPL-MCNC: 59 MG/DL — HIGH (ref 7–23)
BUN SERPL-MCNC: 60 MG/DL — HIGH (ref 7–23)
BUN SERPL-MCNC: 61 MG/DL — HIGH (ref 7–23)
BUN SERPL-MCNC: 66 MG/DL — HIGH (ref 7–23)
BUN SERPL-MCNC: 72 MG/DL — HIGH (ref 7–23)
BUN SERPL-MCNC: 73 MG/DL — HIGH (ref 7–23)
CALCIUM SERPL-MCNC: 6.8 MG/DL — LOW (ref 8.5–10.1)
CALCIUM SERPL-MCNC: 7.6 MG/DL — LOW (ref 8.5–10.1)
CALCIUM SERPL-MCNC: 8.2 MG/DL — LOW (ref 8.5–10.1)
CALCIUM SERPL-MCNC: 8.2 MG/DL — LOW (ref 8.5–10.1)
CALCIUM SERPL-MCNC: 8.3 MG/DL — LOW (ref 8.5–10.1)
CALCIUM SERPL-MCNC: 8.4 MG/DL — LOW (ref 8.5–10.1)
CALCIUM SERPL-MCNC: 8.5 MG/DL — SIGNIFICANT CHANGE UP (ref 8.5–10.1)
CALCIUM SERPL-MCNC: 8.8 MG/DL — SIGNIFICANT CHANGE UP (ref 8.5–10.1)
CALCIUM SERPL-MCNC: 8.8 MG/DL — SIGNIFICANT CHANGE UP (ref 8.5–10.1)
CALCIUM SERPL-MCNC: 8.9 MG/DL — SIGNIFICANT CHANGE UP (ref 8.5–10.1)
CALCIUM SERPL-MCNC: 9 MG/DL — SIGNIFICANT CHANGE UP (ref 8.5–10.1)
CALCIUM SERPL-MCNC: 9.5 MG/DL — SIGNIFICANT CHANGE UP (ref 8.5–10.1)
CHLORIDE SERPL-SCNC: 101 MMOL/L — SIGNIFICANT CHANGE UP (ref 96–108)
CHLORIDE SERPL-SCNC: 103 MMOL/L — SIGNIFICANT CHANGE UP (ref 96–108)
CHLORIDE SERPL-SCNC: 104 MMOL/L — SIGNIFICANT CHANGE UP (ref 96–108)
CHLORIDE SERPL-SCNC: 105 MMOL/L — SIGNIFICANT CHANGE UP (ref 96–108)
CHLORIDE SERPL-SCNC: 106 MMOL/L — SIGNIFICANT CHANGE UP (ref 96–108)
CHLORIDE SERPL-SCNC: 108 MMOL/L — SIGNIFICANT CHANGE UP (ref 96–108)
CHLORIDE SERPL-SCNC: 108 MMOL/L — SIGNIFICANT CHANGE UP (ref 96–108)
CHLORIDE SERPL-SCNC: 109 MMOL/L — HIGH (ref 96–108)
CHLORIDE SERPL-SCNC: 111 MMOL/L — HIGH (ref 96–108)
CHLORIDE SERPL-SCNC: 98 MMOL/L — SIGNIFICANT CHANGE UP (ref 96–108)
CHLORIDE SERPL-SCNC: 99 MMOL/L — SIGNIFICANT CHANGE UP (ref 96–108)
CHLORIDE SERPL-SCNC: 99 MMOL/L — SIGNIFICANT CHANGE UP (ref 96–108)
CK MB BLD-MCNC: 1.9 % — SIGNIFICANT CHANGE UP (ref 0–3.5)
CK MB CFR SERPL CALC: 2.5 NG/ML — SIGNIFICANT CHANGE UP (ref 0.5–3.6)
CK SERPL-CCNC: 130 U/L — SIGNIFICANT CHANGE UP (ref 26–308)
CO2 BLDA-SCNC: 15 MMOL/L — LOW (ref 19–24)
CO2 SERPL-SCNC: 16 MMOL/L — LOW (ref 22–31)
CO2 SERPL-SCNC: 16 MMOL/L — LOW (ref 22–31)
CO2 SERPL-SCNC: 17 MMOL/L — LOW (ref 22–31)
CO2 SERPL-SCNC: 18 MMOL/L — LOW (ref 22–31)
CO2 SERPL-SCNC: 18 MMOL/L — LOW (ref 22–31)
CO2 SERPL-SCNC: 21 MMOL/L — LOW (ref 22–31)
CO2 SERPL-SCNC: 22 MMOL/L — SIGNIFICANT CHANGE UP (ref 22–31)
CO2 SERPL-SCNC: 23 MMOL/L — SIGNIFICANT CHANGE UP (ref 22–31)
CO2 SERPL-SCNC: 24 MMOL/L — SIGNIFICANT CHANGE UP (ref 22–31)
CO2 SERPL-SCNC: 26 MMOL/L — SIGNIFICANT CHANGE UP (ref 22–31)
CO2 SERPL-SCNC: 27 MMOL/L — SIGNIFICANT CHANGE UP (ref 22–31)
CO2 SERPL-SCNC: 27 MMOL/L — SIGNIFICANT CHANGE UP (ref 22–31)
CO2 SERPL-SCNC: 28 MMOL/L — SIGNIFICANT CHANGE UP (ref 22–31)
COLOR SPEC: ABNORMAL
CREAT SERPL-MCNC: 4.12 MG/DL — HIGH (ref 0.5–1.3)
CREAT SERPL-MCNC: 4.7 MG/DL — HIGH (ref 0.5–1.3)
CREAT SERPL-MCNC: 5.52 MG/DL — HIGH (ref 0.5–1.3)
CREAT SERPL-MCNC: 5.88 MG/DL — HIGH (ref 0.5–1.3)
CREAT SERPL-MCNC: 6.29 MG/DL — HIGH (ref 0.5–1.3)
CREAT SERPL-MCNC: 6.36 MG/DL — HIGH (ref 0.5–1.3)
CREAT SERPL-MCNC: 6.36 MG/DL — HIGH (ref 0.5–1.3)
CREAT SERPL-MCNC: 6.91 MG/DL — HIGH (ref 0.5–1.3)
CREAT SERPL-MCNC: 7.37 MG/DL — HIGH (ref 0.5–1.3)
CREAT SERPL-MCNC: 7.53 MG/DL — HIGH (ref 0.5–1.3)
CREAT SERPL-MCNC: 7.66 MG/DL — HIGH (ref 0.5–1.3)
CREAT SERPL-MCNC: 7.87 MG/DL — HIGH (ref 0.5–1.3)
CREAT SERPL-MCNC: 8.59 MG/DL — HIGH (ref 0.5–1.3)
CREAT SERPL-MCNC: 8.66 MG/DL — HIGH (ref 0.5–1.3)
CREAT SERPL-MCNC: 8.95 MG/DL — HIGH (ref 0.5–1.3)
CREAT SERPL-MCNC: 9.16 MG/DL — HIGH (ref 0.5–1.3)
CREAT SERPL-MCNC: 9.26 MG/DL — HIGH (ref 0.5–1.3)
CREAT SERPL-MCNC: 9.34 MG/DL — HIGH (ref 0.5–1.3)
CULTURE RESULTS: SIGNIFICANT CHANGE UP
DIFF PNL FLD: ABNORMAL
EGFR: 10 ML/MIN/1.73M2 — LOW
EGFR: 12 ML/MIN/1.73M2 — LOW
EGFR: 14 ML/MIN/1.73M2 — LOW
EGFR: 5 ML/MIN/1.73M2 — LOW
EGFR: 6 ML/MIN/1.73M2 — LOW
EGFR: 8 ML/MIN/1.73M2 — LOW
EGFR: 8 ML/MIN/1.73M2 — LOW
EGFR: 9 ML/MIN/1.73M2 — LOW
EOSINOPHIL # BLD AUTO: 0 K/UL — SIGNIFICANT CHANGE UP (ref 0–0.5)
EOSINOPHIL # BLD AUTO: 0.06 K/UL — SIGNIFICANT CHANGE UP (ref 0–0.5)
EOSINOPHIL # BLD AUTO: 0.18 K/UL — SIGNIFICANT CHANGE UP (ref 0–0.5)
EOSINOPHIL NFR BLD AUTO: 0 % — SIGNIFICANT CHANGE UP (ref 0–6)
EOSINOPHIL NFR BLD AUTO: 1 % — SIGNIFICANT CHANGE UP (ref 0–6)
EOSINOPHIL NFR BLD AUTO: 2.4 % — SIGNIFICANT CHANGE UP (ref 0–6)
EPI CELLS # UR: SIGNIFICANT CHANGE UP
FLUAV AG NPH QL: SIGNIFICANT CHANGE UP
FLUAV AG NPH QL: SIGNIFICANT CHANGE UP
FLUBV AG NPH QL: SIGNIFICANT CHANGE UP
FLUBV AG NPH QL: SIGNIFICANT CHANGE UP
GAS PNL BLDA: SIGNIFICANT CHANGE UP
GAS PNL BLDA: SIGNIFICANT CHANGE UP
GLUCOSE BLDC GLUCOMTR-MCNC: 100 MG/DL — HIGH (ref 70–99)
GLUCOSE BLDC GLUCOMTR-MCNC: 103 MG/DL — HIGH (ref 70–99)
GLUCOSE BLDC GLUCOMTR-MCNC: 113 MG/DL — HIGH (ref 70–99)
GLUCOSE BLDC GLUCOMTR-MCNC: 114 MG/DL — HIGH (ref 70–99)
GLUCOSE BLDC GLUCOMTR-MCNC: 114 MG/DL — HIGH (ref 70–99)
GLUCOSE BLDC GLUCOMTR-MCNC: 116 MG/DL — HIGH (ref 70–99)
GLUCOSE BLDC GLUCOMTR-MCNC: 118 MG/DL — HIGH (ref 70–99)
GLUCOSE BLDC GLUCOMTR-MCNC: 124 MG/DL — HIGH (ref 70–99)
GLUCOSE BLDC GLUCOMTR-MCNC: 125 MG/DL — HIGH (ref 70–99)
GLUCOSE BLDC GLUCOMTR-MCNC: 129 MG/DL — HIGH (ref 70–99)
GLUCOSE BLDC GLUCOMTR-MCNC: 132 MG/DL — HIGH (ref 70–99)
GLUCOSE BLDC GLUCOMTR-MCNC: 141 MG/DL — HIGH (ref 70–99)
GLUCOSE BLDC GLUCOMTR-MCNC: 143 MG/DL — HIGH (ref 70–99)
GLUCOSE BLDC GLUCOMTR-MCNC: 72 MG/DL — SIGNIFICANT CHANGE UP (ref 70–99)
GLUCOSE BLDC GLUCOMTR-MCNC: 75 MG/DL — SIGNIFICANT CHANGE UP (ref 70–99)
GLUCOSE BLDC GLUCOMTR-MCNC: 81 MG/DL — SIGNIFICANT CHANGE UP (ref 70–99)
GLUCOSE BLDC GLUCOMTR-MCNC: 84 MG/DL — SIGNIFICANT CHANGE UP (ref 70–99)
GLUCOSE BLDC GLUCOMTR-MCNC: 91 MG/DL — SIGNIFICANT CHANGE UP (ref 70–99)
GLUCOSE BLDC GLUCOMTR-MCNC: 92 MG/DL — SIGNIFICANT CHANGE UP (ref 70–99)
GLUCOSE BLDC GLUCOMTR-MCNC: 92 MG/DL — SIGNIFICANT CHANGE UP (ref 70–99)
GLUCOSE BLDC GLUCOMTR-MCNC: 93 MG/DL — SIGNIFICANT CHANGE UP (ref 70–99)
GLUCOSE BLDC GLUCOMTR-MCNC: 94 MG/DL — SIGNIFICANT CHANGE UP (ref 70–99)
GLUCOSE SERPL-MCNC: 100 MG/DL — HIGH (ref 70–99)
GLUCOSE SERPL-MCNC: 101 MG/DL — HIGH (ref 70–99)
GLUCOSE SERPL-MCNC: 102 MG/DL — HIGH (ref 70–99)
GLUCOSE SERPL-MCNC: 112 MG/DL — HIGH (ref 70–99)
GLUCOSE SERPL-MCNC: 113 MG/DL — HIGH (ref 70–99)
GLUCOSE SERPL-MCNC: 114 MG/DL — HIGH (ref 70–99)
GLUCOSE SERPL-MCNC: 121 MG/DL — HIGH (ref 70–99)
GLUCOSE SERPL-MCNC: 130 MG/DL — HIGH (ref 70–99)
GLUCOSE SERPL-MCNC: 145 MG/DL — HIGH (ref 70–99)
GLUCOSE SERPL-MCNC: 159 MG/DL — HIGH (ref 70–99)
GLUCOSE SERPL-MCNC: 178 MG/DL — HIGH (ref 70–99)
GLUCOSE SERPL-MCNC: 180 MG/DL — HIGH (ref 70–99)
GLUCOSE SERPL-MCNC: 273 MG/DL — HIGH (ref 70–99)
GLUCOSE SERPL-MCNC: 80 MG/DL — SIGNIFICANT CHANGE UP (ref 70–99)
GLUCOSE SERPL-MCNC: 89 MG/DL — SIGNIFICANT CHANGE UP (ref 70–99)
GLUCOSE SERPL-MCNC: 94 MG/DL — SIGNIFICANT CHANGE UP (ref 70–99)
GLUCOSE SERPL-MCNC: 97 MG/DL — SIGNIFICANT CHANGE UP (ref 70–99)
GLUCOSE SERPL-MCNC: 99 MG/DL — SIGNIFICANT CHANGE UP (ref 70–99)
GLUCOSE UR QL: NEGATIVE MG/DL — SIGNIFICANT CHANGE UP
GRAM STN FLD: SIGNIFICANT CHANGE UP
HCO3 BLDA-SCNC: 14 MMOL/L — LOW (ref 21–28)
HCT VFR BLD CALC: 27.1 % — LOW (ref 39–50)
HCT VFR BLD CALC: 30.1 % — LOW (ref 39–50)
HCT VFR BLD CALC: 30.2 % — LOW (ref 39–50)
HCT VFR BLD CALC: 30.4 % — LOW (ref 39–50)
HCT VFR BLD CALC: 30.5 % — LOW (ref 39–50)
HCT VFR BLD CALC: 31.4 % — LOW (ref 39–50)
HCT VFR BLD CALC: 31.7 % — LOW (ref 39–50)
HCT VFR BLD CALC: 31.9 % — LOW (ref 39–50)
HCT VFR BLD CALC: 32.1 % — LOW (ref 39–50)
HCT VFR BLD CALC: 32.6 % — LOW (ref 39–50)
HCT VFR BLD CALC: 32.8 % — LOW (ref 39–50)
HCT VFR BLD CALC: 32.9 % — LOW (ref 39–50)
HCT VFR BLD CALC: 33.9 % — LOW (ref 39–50)
HCT VFR BLD CALC: 34.2 % — LOW (ref 39–50)
HCT VFR BLD CALC: 34.5 % — LOW (ref 39–50)
HCT VFR BLD CALC: 34.9 % — LOW (ref 39–50)
HCT VFR BLD CALC: 35.1 % — LOW (ref 39–50)
HCT VFR BLD CALC: 35.2 % — LOW (ref 39–50)
HCT VFR BLD CALC: 35.5 % — LOW (ref 39–50)
HCT VFR BLD CALC: 37.8 % — LOW (ref 39–50)
HCT VFR BLD CALC: 38 % — LOW (ref 39–50)
HGB BLD-MCNC: 10.2 G/DL — LOW (ref 13–17)
HGB BLD-MCNC: 10.2 G/DL — LOW (ref 13–17)
HGB BLD-MCNC: 10.4 G/DL — LOW (ref 13–17)
HGB BLD-MCNC: 10.5 G/DL — LOW (ref 13–17)
HGB BLD-MCNC: 10.5 G/DL — LOW (ref 13–17)
HGB BLD-MCNC: 11 G/DL — LOW (ref 13–17)
HGB BLD-MCNC: 11.1 G/DL — LOW (ref 13–17)
HGB BLD-MCNC: 11.2 G/DL — LOW (ref 13–17)
HGB BLD-MCNC: 11.3 G/DL — LOW (ref 13–17)
HGB BLD-MCNC: 11.8 G/DL — LOW (ref 13–17)
HGB BLD-MCNC: 12.3 G/DL — LOW (ref 13–17)
HGB BLD-MCNC: 8.8 G/DL — LOW (ref 13–17)
HGB BLD-MCNC: 9.7 G/DL — LOW (ref 13–17)
HGB BLD-MCNC: 9.8 G/DL — LOW (ref 13–17)
HGB BLD-MCNC: 9.9 G/DL — LOW (ref 13–17)
HGB BLD-MCNC: 9.9 G/DL — LOW (ref 13–17)
HOROWITZ INDEX BLDA+IHG-RTO: 100 — SIGNIFICANT CHANGE UP
IMM GRANULOCYTES NFR BLD AUTO: 0.6 % — SIGNIFICANT CHANGE UP (ref 0–1.5)
IMM GRANULOCYTES NFR BLD AUTO: 0.7 % — SIGNIFICANT CHANGE UP (ref 0–1.5)
INR BLD: 1.3 RATIO — HIGH (ref 0.88–1.16)
INR BLD: 1.5 RATIO — HIGH (ref 0.88–1.16)
INR BLD: 1.59 RATIO — HIGH (ref 0.88–1.16)
KETONES UR-MCNC: ABNORMAL
LACTATE SERPL-SCNC: 0.9 MMOL/L — SIGNIFICANT CHANGE UP (ref 0.7–2)
LACTATE SERPL-SCNC: 1 MMOL/L — SIGNIFICANT CHANGE UP (ref 0.7–2)
LACTATE SERPL-SCNC: 1.5 MMOL/L — SIGNIFICANT CHANGE UP (ref 0.7–2)
LACTATE SERPL-SCNC: 3 MMOL/L — HIGH (ref 0.7–2)
LACTATE SERPL-SCNC: 6.4 MMOL/L — CRITICAL HIGH (ref 0.7–2)
LACTATE SERPL-SCNC: 6.7 MMOL/L — CRITICAL HIGH (ref 0.7–2)
LEUKOCYTE ESTERASE UR-ACNC: ABNORMAL
LIDOCAIN IGE QN: 241 U/L — SIGNIFICANT CHANGE UP (ref 73–393)
LIDOCAIN IGE QN: 407 U/L — HIGH (ref 73–393)
LIDOCAIN IGE QN: 429 U/L — HIGH (ref 73–393)
LIDOCAIN IGE QN: 507 U/L — HIGH (ref 73–393)
LIDOCAIN IGE QN: 714 U/L — HIGH (ref 73–393)
LYMPHOCYTES # BLD AUTO: 0.26 K/UL — LOW (ref 1–3.3)
LYMPHOCYTES # BLD AUTO: 0.48 K/UL — LOW (ref 1–3.3)
LYMPHOCYTES # BLD AUTO: 0.54 K/UL — LOW (ref 1–3.3)
LYMPHOCYTES # BLD AUTO: 0.97 K/UL — LOW (ref 1–3.3)
LYMPHOCYTES # BLD AUTO: 1.6 % — LOW (ref 13–44)
LYMPHOCYTES # BLD AUTO: 12.8 % — LOW (ref 13–44)
LYMPHOCYTES # BLD AUTO: 2.4 K/UL — SIGNIFICANT CHANGE UP (ref 1–3.3)
LYMPHOCYTES # BLD AUTO: 3.1 % — LOW (ref 13–44)
LYMPHOCYTES # BLD AUTO: 4.4 % — LOW (ref 13–44)
LYMPHOCYTES # BLD AUTO: 40 % — SIGNIFICANT CHANGE UP (ref 13–44)
MACROCYTES BLD QL: SLIGHT — SIGNIFICANT CHANGE UP
MAGNESIUM SERPL-MCNC: 2.3 MG/DL — SIGNIFICANT CHANGE UP (ref 1.6–2.6)
MAGNESIUM SERPL-MCNC: 2.5 MG/DL — SIGNIFICANT CHANGE UP (ref 1.6–2.6)
MAGNESIUM SERPL-MCNC: 2.5 MG/DL — SIGNIFICANT CHANGE UP (ref 1.6–2.6)
MAGNESIUM SERPL-MCNC: 2.6 MG/DL — SIGNIFICANT CHANGE UP (ref 1.6–2.6)
MAGNESIUM SERPL-MCNC: 2.7 MG/DL — HIGH (ref 1.6–2.6)
MAGNESIUM SERPL-MCNC: 2.8 MG/DL — HIGH (ref 1.6–2.6)
MAGNESIUM SERPL-MCNC: 2.9 MG/DL — HIGH (ref 1.6–2.6)
MANUAL SMEAR VERIFICATION: SIGNIFICANT CHANGE UP
MCHC RBC-ENTMCNC: 30.6 G/DL — LOW (ref 32–36)
MCHC RBC-ENTMCNC: 30.7 G/DL — LOW (ref 32–36)
MCHC RBC-ENTMCNC: 31.2 G/DL — LOW (ref 32–36)
MCHC RBC-ENTMCNC: 31.5 G/DL — LOW (ref 32–36)
MCHC RBC-ENTMCNC: 31.7 G/DL — LOW (ref 32–36)
MCHC RBC-ENTMCNC: 31.8 G/DL — LOW (ref 32–36)
MCHC RBC-ENTMCNC: 31.9 G/DL — LOW (ref 32–36)
MCHC RBC-ENTMCNC: 32.1 G/DL — SIGNIFICANT CHANGE UP (ref 32–36)
MCHC RBC-ENTMCNC: 32.1 G/DL — SIGNIFICANT CHANGE UP (ref 32–36)
MCHC RBC-ENTMCNC: 32.1 PG — SIGNIFICANT CHANGE UP (ref 27–34)
MCHC RBC-ENTMCNC: 32.2 G/DL — SIGNIFICANT CHANGE UP (ref 32–36)
MCHC RBC-ENTMCNC: 32.2 PG — SIGNIFICANT CHANGE UP (ref 27–34)
MCHC RBC-ENTMCNC: 32.4 G/DL — SIGNIFICANT CHANGE UP (ref 32–36)
MCHC RBC-ENTMCNC: 32.4 G/DL — SIGNIFICANT CHANGE UP (ref 32–36)
MCHC RBC-ENTMCNC: 32.4 PG — SIGNIFICANT CHANGE UP (ref 27–34)
MCHC RBC-ENTMCNC: 32.4 PG — SIGNIFICANT CHANGE UP (ref 27–34)
MCHC RBC-ENTMCNC: 32.5 G/DL — SIGNIFICANT CHANGE UP (ref 32–36)
MCHC RBC-ENTMCNC: 32.5 G/DL — SIGNIFICANT CHANGE UP (ref 32–36)
MCHC RBC-ENTMCNC: 32.5 PG — SIGNIFICANT CHANGE UP (ref 27–34)
MCHC RBC-ENTMCNC: 32.5 PG — SIGNIFICANT CHANGE UP (ref 27–34)
MCHC RBC-ENTMCNC: 32.6 G/DL — SIGNIFICANT CHANGE UP (ref 32–36)
MCHC RBC-ENTMCNC: 32.6 G/DL — SIGNIFICANT CHANGE UP (ref 32–36)
MCHC RBC-ENTMCNC: 32.6 PG — SIGNIFICANT CHANGE UP (ref 27–34)
MCHC RBC-ENTMCNC: 32.6 PG — SIGNIFICANT CHANGE UP (ref 27–34)
MCHC RBC-ENTMCNC: 32.7 G/DL — SIGNIFICANT CHANGE UP (ref 32–36)
MCHC RBC-ENTMCNC: 32.7 PG — SIGNIFICANT CHANGE UP (ref 27–34)
MCHC RBC-ENTMCNC: 32.8 PG — SIGNIFICANT CHANGE UP (ref 27–34)
MCHC RBC-ENTMCNC: 32.9 G/DL — SIGNIFICANT CHANGE UP (ref 32–36)
MCHC RBC-ENTMCNC: 32.9 PG — SIGNIFICANT CHANGE UP (ref 27–34)
MCHC RBC-ENTMCNC: 33.1 PG — SIGNIFICANT CHANGE UP (ref 27–34)
MCHC RBC-ENTMCNC: 33.1 PG — SIGNIFICANT CHANGE UP (ref 27–34)
MCHC RBC-ENTMCNC: 33.2 PG — SIGNIFICANT CHANGE UP (ref 27–34)
MCHC RBC-ENTMCNC: 33.2 PG — SIGNIFICANT CHANGE UP (ref 27–34)
MCHC RBC-ENTMCNC: 33.3 PG — SIGNIFICANT CHANGE UP (ref 27–34)
MCHC RBC-ENTMCNC: 33.4 PG — SIGNIFICANT CHANGE UP (ref 27–34)
MCHC RBC-ENTMCNC: 33.6 PG — SIGNIFICANT CHANGE UP (ref 27–34)
MCHC RBC-ENTMCNC: 33.8 G/DL — SIGNIFICANT CHANGE UP (ref 32–36)
MCV RBC AUTO: 100 FL — SIGNIFICANT CHANGE UP (ref 80–100)
MCV RBC AUTO: 100.6 FL — HIGH (ref 80–100)
MCV RBC AUTO: 101 FL — HIGH (ref 80–100)
MCV RBC AUTO: 101 FL — HIGH (ref 80–100)
MCV RBC AUTO: 101.2 FL — HIGH (ref 80–100)
MCV RBC AUTO: 101.2 FL — HIGH (ref 80–100)
MCV RBC AUTO: 101.5 FL — HIGH (ref 80–100)
MCV RBC AUTO: 101.7 FL — HIGH (ref 80–100)
MCV RBC AUTO: 102 FL — HIGH (ref 80–100)
MCV RBC AUTO: 102.9 FL — HIGH (ref 80–100)
MCV RBC AUTO: 103.3 FL — HIGH (ref 80–100)
MCV RBC AUTO: 103.5 FL — HIGH (ref 80–100)
MCV RBC AUTO: 103.8 FL — HIGH (ref 80–100)
MCV RBC AUTO: 104.1 FL — HIGH (ref 80–100)
MCV RBC AUTO: 104.9 FL — HIGH (ref 80–100)
MCV RBC AUTO: 105 FL — HIGH (ref 80–100)
MCV RBC AUTO: 105.9 FL — HIGH (ref 80–100)
MCV RBC AUTO: 106 FL — HIGH (ref 80–100)
MCV RBC AUTO: 98.7 FL — SIGNIFICANT CHANGE UP (ref 80–100)
METAMYELOCYTES # FLD: 3 % — HIGH (ref 0–0)
METHOD TYPE: SIGNIFICANT CHANGE UP
METHOD TYPE: SIGNIFICANT CHANGE UP
MICROCYTES BLD QL: SLIGHT — SIGNIFICANT CHANGE UP
MONOCYTES # BLD AUTO: 0.06 K/UL — SIGNIFICANT CHANGE UP (ref 0–0.9)
MONOCYTES # BLD AUTO: 0.43 K/UL — SIGNIFICANT CHANGE UP (ref 0–0.9)
MONOCYTES # BLD AUTO: 0.51 K/UL — SIGNIFICANT CHANGE UP (ref 0–0.9)
MONOCYTES # BLD AUTO: 0.79 K/UL — SIGNIFICANT CHANGE UP (ref 0–0.9)
MONOCYTES # BLD AUTO: 1.04 K/UL — HIGH (ref 0–0.9)
MONOCYTES NFR BLD AUTO: 1 % — LOW (ref 2–14)
MONOCYTES NFR BLD AUTO: 4.1 % — SIGNIFICANT CHANGE UP (ref 2–14)
MONOCYTES NFR BLD AUTO: 4.9 % — SIGNIFICANT CHANGE UP (ref 2–14)
MONOCYTES NFR BLD AUTO: 5.7 % — SIGNIFICANT CHANGE UP (ref 2–14)
MONOCYTES NFR BLD AUTO: 6.6 % — SIGNIFICANT CHANGE UP (ref 2–14)
MRSA PCR RESULT.: SIGNIFICANT CHANGE UP
NEUTROPHILS # BLD AUTO: 11.18 K/UL — HIGH (ref 1.8–7.4)
NEUTROPHILS # BLD AUTO: 14.03 K/UL — HIGH (ref 1.8–7.4)
NEUTROPHILS # BLD AUTO: 14.95 K/UL — HIGH (ref 1.8–7.4)
NEUTROPHILS # BLD AUTO: 3.31 K/UL — SIGNIFICANT CHANGE UP (ref 1.8–7.4)
NEUTROPHILS # BLD AUTO: 5.93 K/UL — SIGNIFICANT CHANGE UP (ref 1.8–7.4)
NEUTROPHILS NFR BLD AUTO: 52 % — SIGNIFICANT CHANGE UP (ref 43–77)
NEUTROPHILS NFR BLD AUTO: 78.1 % — HIGH (ref 43–77)
NEUTROPHILS NFR BLD AUTO: 89.6 % — HIGH (ref 43–77)
NEUTROPHILS NFR BLD AUTO: 90.9 % — HIGH (ref 43–77)
NEUTROPHILS NFR BLD AUTO: 92.8 % — HIGH (ref 43–77)
NEUTS BAND # BLD: 3 % — SIGNIFICANT CHANGE UP (ref 0–8)
NITRITE UR-MCNC: POSITIVE
NRBC # BLD: 0 /100 WBCS — SIGNIFICANT CHANGE UP (ref 0–0)
NRBC # BLD: 0 /100 — SIGNIFICANT CHANGE UP (ref 0–0)
NRBC # BLD: SIGNIFICANT CHANGE UP /100 WBCS (ref 0–0)
NT-PROBNP SERPL-SCNC: HIGH PG/ML (ref 0–450)
NT-PROBNP SERPL-SCNC: HIGH PG/ML (ref 0–450)
ORGANISM # SPEC MICROSCOPIC CNT: SIGNIFICANT CHANGE UP
PCO2 BLDA: 32 MMHG — SIGNIFICANT CHANGE UP (ref 32–46)
PH BLD: 7.26 — LOW (ref 7.35–7.45)
PH UR: 6.5 — SIGNIFICANT CHANGE UP (ref 5–8)
PHOSPHATE SERPL-MCNC: 2.5 MG/DL — SIGNIFICANT CHANGE UP (ref 2.5–4.5)
PHOSPHATE SERPL-MCNC: 3.6 MG/DL — SIGNIFICANT CHANGE UP (ref 2.5–4.5)
PHOSPHATE SERPL-MCNC: 3.6 MG/DL — SIGNIFICANT CHANGE UP (ref 2.5–4.5)
PHOSPHATE SERPL-MCNC: 3.9 MG/DL — SIGNIFICANT CHANGE UP (ref 2.5–4.5)
PHOSPHATE SERPL-MCNC: 5.6 MG/DL — HIGH (ref 2.5–4.5)
PHOSPHATE SERPL-MCNC: 5.8 MG/DL — HIGH (ref 2.5–4.5)
PHOSPHATE SERPL-MCNC: 6.8 MG/DL — HIGH (ref 2.5–4.5)
PHOSPHATE SERPL-MCNC: 7 MG/DL — HIGH (ref 2.5–4.5)
PHOSPHATE SERPL-MCNC: 7.7 MG/DL — HIGH (ref 2.5–4.5)
PHOSPHATE SERPL-MCNC: 8.7 MG/DL — SIGNIFICANT CHANGE UP (ref 2.5–4.5)
PLAT MORPH BLD: NORMAL — SIGNIFICANT CHANGE UP
PLATELET # BLD AUTO: 115 K/UL — LOW (ref 150–400)
PLATELET # BLD AUTO: 129 K/UL — LOW (ref 150–400)
PLATELET # BLD AUTO: 144 K/UL — LOW (ref 150–400)
PLATELET # BLD AUTO: 145 K/UL — LOW (ref 150–400)
PLATELET # BLD AUTO: 153 K/UL — SIGNIFICANT CHANGE UP (ref 150–400)
PLATELET # BLD AUTO: 162 K/UL — SIGNIFICANT CHANGE UP (ref 150–400)
PLATELET # BLD AUTO: 174 K/UL — SIGNIFICANT CHANGE UP (ref 150–400)
PLATELET # BLD AUTO: 178 K/UL — SIGNIFICANT CHANGE UP (ref 150–400)
PLATELET # BLD AUTO: 179 K/UL — SIGNIFICANT CHANGE UP (ref 150–400)
PLATELET # BLD AUTO: 181 K/UL — SIGNIFICANT CHANGE UP (ref 150–400)
PLATELET # BLD AUTO: 181 K/UL — SIGNIFICANT CHANGE UP (ref 150–400)
PLATELET # BLD AUTO: 187 K/UL — SIGNIFICANT CHANGE UP (ref 150–400)
PLATELET # BLD AUTO: 188 K/UL — SIGNIFICANT CHANGE UP (ref 150–400)
PLATELET # BLD AUTO: 190 K/UL — SIGNIFICANT CHANGE UP (ref 150–400)
PLATELET # BLD AUTO: 190 K/UL — SIGNIFICANT CHANGE UP (ref 150–400)
PLATELET # BLD AUTO: 193 K/UL — SIGNIFICANT CHANGE UP (ref 150–400)
PLATELET # BLD AUTO: 195 K/UL — SIGNIFICANT CHANGE UP (ref 150–400)
PLATELET # BLD AUTO: 207 K/UL — SIGNIFICANT CHANGE UP (ref 150–400)
PLATELET # BLD AUTO: 231 K/UL — SIGNIFICANT CHANGE UP (ref 150–400)
PLATELET # BLD AUTO: 232 K/UL — SIGNIFICANT CHANGE UP (ref 150–400)
PLATELET # BLD AUTO: 281 K/UL — SIGNIFICANT CHANGE UP (ref 150–400)
PO2 BLDA: 289 MMHG — HIGH (ref 83–108)
POTASSIUM SERPL-MCNC: 2.7 MMOL/L — CRITICAL LOW (ref 3.5–5.3)
POTASSIUM SERPL-MCNC: 2.9 MMOL/L — CRITICAL LOW (ref 3.5–5.3)
POTASSIUM SERPL-MCNC: 3 MMOL/L — LOW (ref 3.5–5.3)
POTASSIUM SERPL-MCNC: 3.1 MMOL/L — LOW (ref 3.5–5.3)
POTASSIUM SERPL-MCNC: 3.3 MMOL/L — LOW (ref 3.5–5.3)
POTASSIUM SERPL-MCNC: 3.4 MMOL/L — LOW (ref 3.5–5.3)
POTASSIUM SERPL-MCNC: 3.6 MMOL/L — SIGNIFICANT CHANGE UP (ref 3.5–5.3)
POTASSIUM SERPL-MCNC: 3.7 MMOL/L — SIGNIFICANT CHANGE UP (ref 3.5–5.3)
POTASSIUM SERPL-MCNC: 3.7 MMOL/L — SIGNIFICANT CHANGE UP (ref 3.5–5.3)
POTASSIUM SERPL-MCNC: 3.8 MMOL/L — SIGNIFICANT CHANGE UP (ref 3.5–5.3)
POTASSIUM SERPL-MCNC: 3.8 MMOL/L — SIGNIFICANT CHANGE UP (ref 3.5–5.3)
POTASSIUM SERPL-MCNC: 4 MMOL/L — SIGNIFICANT CHANGE UP (ref 3.5–5.3)
POTASSIUM SERPL-MCNC: 4.1 MMOL/L — SIGNIFICANT CHANGE UP (ref 3.5–5.3)
POTASSIUM SERPL-MCNC: 4.2 MMOL/L — SIGNIFICANT CHANGE UP (ref 3.5–5.3)
POTASSIUM SERPL-MCNC: 4.5 MMOL/L — SIGNIFICANT CHANGE UP (ref 3.5–5.3)
POTASSIUM SERPL-SCNC: 2.7 MMOL/L — CRITICAL LOW (ref 3.5–5.3)
POTASSIUM SERPL-SCNC: 2.9 MMOL/L — CRITICAL LOW (ref 3.5–5.3)
POTASSIUM SERPL-SCNC: 3 MMOL/L — LOW (ref 3.5–5.3)
POTASSIUM SERPL-SCNC: 3.1 MMOL/L — LOW (ref 3.5–5.3)
POTASSIUM SERPL-SCNC: 3.3 MMOL/L — LOW (ref 3.5–5.3)
POTASSIUM SERPL-SCNC: 3.4 MMOL/L — LOW (ref 3.5–5.3)
POTASSIUM SERPL-SCNC: 3.6 MMOL/L — SIGNIFICANT CHANGE UP (ref 3.5–5.3)
POTASSIUM SERPL-SCNC: 3.7 MMOL/L — SIGNIFICANT CHANGE UP (ref 3.5–5.3)
POTASSIUM SERPL-SCNC: 3.7 MMOL/L — SIGNIFICANT CHANGE UP (ref 3.5–5.3)
POTASSIUM SERPL-SCNC: 3.8 MMOL/L — SIGNIFICANT CHANGE UP (ref 3.5–5.3)
POTASSIUM SERPL-SCNC: 3.8 MMOL/L — SIGNIFICANT CHANGE UP (ref 3.5–5.3)
POTASSIUM SERPL-SCNC: 4 MMOL/L — SIGNIFICANT CHANGE UP (ref 3.5–5.3)
POTASSIUM SERPL-SCNC: 4.1 MMOL/L — SIGNIFICANT CHANGE UP (ref 3.5–5.3)
POTASSIUM SERPL-SCNC: 4.2 MMOL/L — SIGNIFICANT CHANGE UP (ref 3.5–5.3)
POTASSIUM SERPL-SCNC: 4.5 MMOL/L — SIGNIFICANT CHANGE UP (ref 3.5–5.3)
PROCALCITONIN SERPL-MCNC: 10.5 NG/ML — HIGH (ref 0.02–0.1)
PROCALCITONIN SERPL-MCNC: 9.18 NG/ML — HIGH (ref 0.02–0.1)
PROT SERPL-MCNC: 6.3 GM/DL — SIGNIFICANT CHANGE UP (ref 6–8.3)
PROT SERPL-MCNC: 6.8 GM/DL — SIGNIFICANT CHANGE UP (ref 6–8.3)
PROT SERPL-MCNC: 6.9 GM/DL — SIGNIFICANT CHANGE UP (ref 6–8.3)
PROT SERPL-MCNC: 6.9 GM/DL — SIGNIFICANT CHANGE UP (ref 6–8.3)
PROT SERPL-MCNC: 7 GM/DL — SIGNIFICANT CHANGE UP (ref 6–8.3)
PROT SERPL-MCNC: 7 GM/DL — SIGNIFICANT CHANGE UP (ref 6–8.3)
PROT SERPL-MCNC: 7.2 GM/DL — SIGNIFICANT CHANGE UP (ref 6–8.3)
PROT SERPL-MCNC: 7.3 GM/DL — SIGNIFICANT CHANGE UP (ref 6–8.3)
PROT SERPL-MCNC: 7.4 GM/DL — SIGNIFICANT CHANGE UP (ref 6–8.3)
PROT SERPL-MCNC: 7.4 GM/DL — SIGNIFICANT CHANGE UP (ref 6–8.3)
PROT SERPL-MCNC: 7.5 GM/DL — SIGNIFICANT CHANGE UP (ref 6–8.3)
PROT SERPL-MCNC: 7.7 GM/DL — SIGNIFICANT CHANGE UP (ref 6–8.3)
PROT SERPL-MCNC: 8.1 GM/DL — SIGNIFICANT CHANGE UP (ref 6–8.3)
PROT UR-MCNC: 500 MG/DL
PROTHROM AB SERPL-ACNC: 15.5 SEC — HIGH (ref 10.5–13.4)
PROTHROM AB SERPL-ACNC: 17.1 SEC — HIGH (ref 10.6–13.6)
PROTHROM AB SERPL-ACNC: 18 SEC — HIGH (ref 10.6–13.6)
RAPID RVP RESULT: SIGNIFICANT CHANGE UP
RBC # BLD: 2.71 M/UL — LOW (ref 4.2–5.8)
RBC # BLD: 2.98 M/UL — LOW (ref 4.2–5.8)
RBC # BLD: 2.98 M/UL — LOW (ref 4.2–5.8)
RBC # BLD: 2.99 M/UL — LOW (ref 4.2–5.8)
RBC # BLD: 3 M/UL — LOW (ref 4.2–5.8)
RBC # BLD: 3.06 M/UL — LOW (ref 4.2–5.8)
RBC # BLD: 3.11 M/UL — LOW (ref 4.2–5.8)
RBC # BLD: 3.19 M/UL — LOW (ref 4.2–5.8)
RBC # BLD: 3.19 M/UL — LOW (ref 4.2–5.8)
RBC # BLD: 3.2 M/UL — LOW (ref 4.2–5.8)
RBC # BLD: 3.24 M/UL — LOW (ref 4.2–5.8)
RBC # BLD: 3.24 M/UL — LOW (ref 4.2–5.8)
RBC # BLD: 3.26 M/UL — LOW (ref 4.2–5.8)
RBC # BLD: 3.29 M/UL — LOW (ref 4.2–5.8)
RBC # BLD: 3.38 M/UL — LOW (ref 4.2–5.8)
RBC # BLD: 3.41 M/UL — LOW (ref 4.2–5.8)
RBC # BLD: 3.43 M/UL — LOW (ref 4.2–5.8)
RBC # BLD: 3.6 M/UL — LOW (ref 4.2–5.8)
RBC # BLD: 3.66 M/UL — LOW (ref 4.2–5.8)
RBC # FLD: 13.4 % — SIGNIFICANT CHANGE UP (ref 10.3–14.5)
RBC # FLD: 13.5 % — SIGNIFICANT CHANGE UP (ref 10.3–14.5)
RBC # FLD: 13.6 % — SIGNIFICANT CHANGE UP (ref 10.3–14.5)
RBC # FLD: 13.7 % — SIGNIFICANT CHANGE UP (ref 10.3–14.5)
RBC # FLD: 13.8 % — SIGNIFICANT CHANGE UP (ref 10.3–14.5)
RBC # FLD: 13.9 % — SIGNIFICANT CHANGE UP (ref 10.3–14.5)
RBC # FLD: 14 % — SIGNIFICANT CHANGE UP (ref 10.3–14.5)
RBC # FLD: 14.1 % — SIGNIFICANT CHANGE UP (ref 10.3–14.5)
RBC # FLD: 14.3 % — SIGNIFICANT CHANGE UP (ref 10.3–14.5)
RBC # FLD: 14.4 % — SIGNIFICANT CHANGE UP (ref 10.3–14.5)
RBC # FLD: 14.5 % — SIGNIFICANT CHANGE UP (ref 10.3–14.5)
RBC # FLD: 14.6 % — HIGH (ref 10.3–14.5)
RBC # FLD: 14.6 % — HIGH (ref 10.3–14.5)
RBC # FLD: 14.7 % — HIGH (ref 10.3–14.5)
RBC BLD AUTO: ABNORMAL
RBC CASTS # UR COMP ASSIST: >50 /HPF (ref 0–4)
S AUREUS DNA NOSE QL NAA+PROBE: DETECTED
SAO2 % BLDA: 100 % — HIGH (ref 94–98)
SARS-COV-2 RNA SPEC QL NAA+PROBE: SIGNIFICANT CHANGE UP
SMUDGE CELLS # BLD: PRESENT — SIGNIFICANT CHANGE UP
SODIUM SERPL-SCNC: 136 MMOL/L — SIGNIFICANT CHANGE UP (ref 135–145)
SODIUM SERPL-SCNC: 137 MMOL/L — SIGNIFICANT CHANGE UP (ref 135–145)
SODIUM SERPL-SCNC: 138 MMOL/L — SIGNIFICANT CHANGE UP (ref 135–145)
SODIUM SERPL-SCNC: 139 MMOL/L — SIGNIFICANT CHANGE UP (ref 135–145)
SODIUM SERPL-SCNC: 140 MMOL/L — SIGNIFICANT CHANGE UP (ref 135–145)
SODIUM SERPL-SCNC: 140 MMOL/L — SIGNIFICANT CHANGE UP (ref 135–145)
SODIUM SERPL-SCNC: 141 MMOL/L — SIGNIFICANT CHANGE UP (ref 135–145)
SODIUM SERPL-SCNC: 141 MMOL/L — SIGNIFICANT CHANGE UP (ref 135–145)
SODIUM SERPL-SCNC: 143 MMOL/L — SIGNIFICANT CHANGE UP (ref 135–145)
SODIUM SERPL-SCNC: 143 MMOL/L — SIGNIFICANT CHANGE UP (ref 135–145)
SP GR SPEC: 1.01 — SIGNIFICANT CHANGE UP (ref 1.01–1.02)
SPECIMEN SOURCE: SIGNIFICANT CHANGE UP
TROPONIN I, HIGH SENSITIVITY RESULT: 117.1 NG/L — HIGH
TROPONIN I, HIGH SENSITIVITY RESULT: 2331.7 NG/L — HIGH
TROPONIN I, HIGH SENSITIVITY RESULT: 2604 NG/L — HIGH
TROPONIN I, HIGH SENSITIVITY RESULT: 2690.3 NG/L — HIGH
TROPONIN I, HIGH SENSITIVITY RESULT: 890.3 NG/L — HIGH
UROBILINOGEN FLD QL: NEGATIVE MG/DL — SIGNIFICANT CHANGE UP
VALPROATE SERPL-MCNC: 36 UG/ML — LOW (ref 50–100)
VANCOMYCIN TROUGH SERPL-MCNC: 19.8 UG/ML — SIGNIFICANT CHANGE UP (ref 10–20)
VANCOMYCIN TROUGH SERPL-MCNC: 6.4 UG/ML — LOW (ref 10–20)
WBC # BLD: 10.23 K/UL — SIGNIFICANT CHANGE UP (ref 3.8–10.5)
WBC # BLD: 10.73 K/UL — HIGH (ref 3.8–10.5)
WBC # BLD: 11.25 K/UL — HIGH (ref 3.8–10.5)
WBC # BLD: 12.3 K/UL — HIGH (ref 3.8–10.5)
WBC # BLD: 12.91 K/UL — HIGH (ref 3.8–10.5)
WBC # BLD: 15.67 K/UL — HIGH (ref 3.8–10.5)
WBC # BLD: 16.11 K/UL — HIGH (ref 3.8–10.5)
WBC # BLD: 21.11 K/UL — HIGH (ref 3.8–10.5)
WBC # BLD: 5.11 K/UL — SIGNIFICANT CHANGE UP (ref 3.8–10.5)
WBC # BLD: 5.72 K/UL — SIGNIFICANT CHANGE UP (ref 3.8–10.5)
WBC # BLD: 6 K/UL — SIGNIFICANT CHANGE UP (ref 3.8–10.5)
WBC # BLD: 6.01 K/UL — SIGNIFICANT CHANGE UP (ref 3.8–10.5)
WBC # BLD: 6.3 K/UL — SIGNIFICANT CHANGE UP (ref 3.8–10.5)
WBC # BLD: 7.58 K/UL — SIGNIFICANT CHANGE UP (ref 3.8–10.5)
WBC # BLD: 7.81 K/UL — SIGNIFICANT CHANGE UP (ref 3.8–10.5)
WBC # BLD: 8.53 K/UL — SIGNIFICANT CHANGE UP (ref 3.8–10.5)
WBC # BLD: 9.48 K/UL — SIGNIFICANT CHANGE UP (ref 3.8–10.5)
WBC # BLD: 9.62 K/UL — SIGNIFICANT CHANGE UP (ref 3.8–10.5)
WBC # BLD: 9.65 K/UL — SIGNIFICANT CHANGE UP (ref 3.8–10.5)
WBC # BLD: 9.66 K/UL — SIGNIFICANT CHANGE UP (ref 3.8–10.5)
WBC # BLD: 9.99 K/UL — SIGNIFICANT CHANGE UP (ref 3.8–10.5)
WBC # FLD AUTO: 10.23 K/UL — SIGNIFICANT CHANGE UP (ref 3.8–10.5)
WBC # FLD AUTO: 10.73 K/UL — HIGH (ref 3.8–10.5)
WBC # FLD AUTO: 11.25 K/UL — HIGH (ref 3.8–10.5)
WBC # FLD AUTO: 12.3 K/UL — HIGH (ref 3.8–10.5)
WBC # FLD AUTO: 12.91 K/UL — HIGH (ref 3.8–10.5)
WBC # FLD AUTO: 15.67 K/UL — HIGH (ref 3.8–10.5)
WBC # FLD AUTO: 16.11 K/UL — HIGH (ref 3.8–10.5)
WBC # FLD AUTO: 21.11 K/UL — HIGH (ref 3.8–10.5)
WBC # FLD AUTO: 5.11 K/UL — SIGNIFICANT CHANGE UP (ref 3.8–10.5)
WBC # FLD AUTO: 5.72 K/UL — SIGNIFICANT CHANGE UP (ref 3.8–10.5)
WBC # FLD AUTO: 6 K/UL — SIGNIFICANT CHANGE UP (ref 3.8–10.5)
WBC # FLD AUTO: 6.01 K/UL — SIGNIFICANT CHANGE UP (ref 3.8–10.5)
WBC # FLD AUTO: 6.3 K/UL — SIGNIFICANT CHANGE UP (ref 3.8–10.5)
WBC # FLD AUTO: 7.58 K/UL — SIGNIFICANT CHANGE UP (ref 3.8–10.5)
WBC # FLD AUTO: 7.81 K/UL — SIGNIFICANT CHANGE UP (ref 3.8–10.5)
WBC # FLD AUTO: 8.53 K/UL — SIGNIFICANT CHANGE UP (ref 3.8–10.5)
WBC # FLD AUTO: 9.48 K/UL — SIGNIFICANT CHANGE UP (ref 3.8–10.5)
WBC # FLD AUTO: 9.62 K/UL — SIGNIFICANT CHANGE UP (ref 3.8–10.5)
WBC # FLD AUTO: 9.65 K/UL — SIGNIFICANT CHANGE UP (ref 3.8–10.5)
WBC # FLD AUTO: 9.66 K/UL — SIGNIFICANT CHANGE UP (ref 3.8–10.5)
WBC # FLD AUTO: 9.99 K/UL — SIGNIFICANT CHANGE UP (ref 3.8–10.5)
WBC UR QL: ABNORMAL

## 2022-01-01 PROCEDURE — 74177 CT ABD & PELVIS W/CONTRAST: CPT | Mod: 26,MA

## 2022-01-01 PROCEDURE — 99291 CRITICAL CARE FIRST HOUR: CPT

## 2022-01-01 PROCEDURE — 99232 SBSQ HOSP IP/OBS MODERATE 35: CPT

## 2022-01-01 PROCEDURE — 47490 INCISION OF GALLBLADDER: CPT

## 2022-01-01 PROCEDURE — 95720 EEG PHY/QHP EA INCR W/VEEG: CPT

## 2022-01-01 PROCEDURE — 99223 1ST HOSP IP/OBS HIGH 75: CPT

## 2022-01-01 PROCEDURE — 76700 US EXAM ABDOM COMPLETE: CPT | Mod: 26

## 2022-01-01 PROCEDURE — 74176 CT ABD & PELVIS W/O CONTRAST: CPT | Mod: 26

## 2022-01-01 PROCEDURE — 12345: CPT | Mod: NC

## 2022-01-01 PROCEDURE — 74018 RADEX ABDOMEN 1 VIEW: CPT | Mod: 26

## 2022-01-01 PROCEDURE — 93010 ELECTROCARDIOGRAM REPORT: CPT

## 2022-01-01 PROCEDURE — 71045 X-RAY EXAM CHEST 1 VIEW: CPT | Mod: 26

## 2022-01-01 PROCEDURE — 93306 TTE W/DOPPLER COMPLETE: CPT | Mod: 26

## 2022-01-01 PROCEDURE — 71250 CT THORAX DX C-: CPT | Mod: 26

## 2022-01-01 PROCEDURE — 99239 HOSP IP/OBS DSCHRG MGMT >30: CPT

## 2022-01-01 PROCEDURE — 99222 1ST HOSP IP/OBS MODERATE 55: CPT

## 2022-01-01 PROCEDURE — ZZZZZ: CPT

## 2022-01-01 PROCEDURE — 70450 CT HEAD/BRAIN W/O DYE: CPT | Mod: 26

## 2022-01-01 PROCEDURE — 99285 EMERGENCY DEPT VISIT HI MDM: CPT

## 2022-01-01 RX ORDER — TRAMADOL HYDROCHLORIDE 50 MG/1
50 TABLET ORAL EVERY 12 HOURS
Refills: 0 | Status: DISCONTINUED | OUTPATIENT
Start: 2022-01-01 | End: 2022-01-01

## 2022-01-01 RX ORDER — HYDROMORPHONE HYDROCHLORIDE 2 MG/ML
1 INJECTION INTRAMUSCULAR; INTRAVENOUS; SUBCUTANEOUS
Refills: 0 | Status: DISCONTINUED | OUTPATIENT
Start: 2022-01-01 | End: 2022-01-01

## 2022-01-01 RX ORDER — ACETAMINOPHEN 500 MG
650 TABLET ORAL ONCE
Refills: 0 | Status: COMPLETED | OUTPATIENT
Start: 2022-01-01 | End: 2022-01-01

## 2022-01-01 RX ORDER — PANTOPRAZOLE SODIUM 20 MG/1
40 TABLET, DELAYED RELEASE ORAL DAILY
Refills: 0 | Status: DISCONTINUED | OUTPATIENT
Start: 2022-01-01 | End: 2022-01-01

## 2022-01-01 RX ORDER — ATORVASTATIN CALCIUM 80 MG/1
1 TABLET, FILM COATED ORAL
Qty: 0 | Refills: 0 | DISCHARGE
Start: 2022-01-01

## 2022-01-01 RX ORDER — TRAMADOL HYDROCHLORIDE 50 MG/1
50 TABLET ORAL EVERY 6 HOURS
Refills: 0 | Status: DISCONTINUED | OUTPATIENT
Start: 2022-01-01 | End: 2022-01-01

## 2022-01-01 RX ORDER — HYDRALAZINE HCL 50 MG
1 TABLET ORAL
Qty: 0 | Refills: 0 | DISCHARGE

## 2022-01-01 RX ORDER — VANCOMYCIN HCL 1 G
1250 VIAL (EA) INTRAVENOUS ONCE
Refills: 0 | Status: COMPLETED | OUTPATIENT
Start: 2022-01-01 | End: 2022-01-01

## 2022-01-01 RX ORDER — PIPERACILLIN AND TAZOBACTAM 4; .5 G/20ML; G/20ML
3.38 INJECTION, POWDER, LYOPHILIZED, FOR SOLUTION INTRAVENOUS ONCE
Refills: 0 | Status: COMPLETED | OUTPATIENT
Start: 2022-01-01 | End: 2022-01-01

## 2022-01-01 RX ORDER — AMLODIPINE BESYLATE 2.5 MG/1
1 TABLET ORAL
Qty: 0 | Refills: 0 | DISCHARGE

## 2022-01-01 RX ORDER — SODIUM CHLORIDE 9 MG/ML
1000 INJECTION INTRAMUSCULAR; INTRAVENOUS; SUBCUTANEOUS ONCE
Refills: 0 | Status: COMPLETED | OUTPATIENT
Start: 2022-01-01 | End: 2022-01-01

## 2022-01-01 RX ORDER — PIPERACILLIN AND TAZOBACTAM 4; .5 G/20ML; G/20ML
3.38 INJECTION, POWDER, LYOPHILIZED, FOR SOLUTION INTRAVENOUS EVERY 12 HOURS
Refills: 0 | Status: COMPLETED | OUTPATIENT
Start: 2022-01-01 | End: 2022-01-01

## 2022-01-01 RX ORDER — ROBINUL 0.2 MG/ML
0.2 INJECTION INTRAMUSCULAR; INTRAVENOUS EVERY 8 HOURS
Refills: 0 | Status: DISCONTINUED | OUTPATIENT
Start: 2022-01-01 | End: 2022-01-01

## 2022-01-01 RX ORDER — AMLODIPINE BESYLATE 2.5 MG/1
10 TABLET ORAL DAILY
Refills: 0 | Status: DISCONTINUED | OUTPATIENT
Start: 2022-01-01 | End: 2022-01-01

## 2022-01-01 RX ORDER — SODIUM BICARBONATE 1 MEQ/ML
0 SYRINGE (ML) INTRAVENOUS
Qty: 0 | Refills: 0 | DISCHARGE

## 2022-01-01 RX ORDER — MIDAZOLAM HYDROCHLORIDE 1 MG/ML
2 INJECTION, SOLUTION INTRAMUSCULAR; INTRAVENOUS ONCE
Refills: 0 | Status: DISCONTINUED | OUTPATIENT
Start: 2022-01-01 | End: 2022-01-01

## 2022-01-01 RX ORDER — MIDAZOLAM HYDROCHLORIDE 1 MG/ML
0.07 INJECTION, SOLUTION INTRAMUSCULAR; INTRAVENOUS
Qty: 100 | Refills: 0 | Status: DISCONTINUED | OUTPATIENT
Start: 2022-01-01 | End: 2022-01-01

## 2022-01-01 RX ORDER — IOHEXOL 300 MG/ML
30 INJECTION, SOLUTION INTRAVENOUS ONCE
Refills: 0 | Status: COMPLETED | OUTPATIENT
Start: 2022-01-01 | End: 2022-01-01

## 2022-01-01 RX ORDER — ATORVASTATIN CALCIUM 80 MG/1
0 TABLET, FILM COATED ORAL
Qty: 0 | Refills: 0 | DISCHARGE

## 2022-01-01 RX ORDER — SENNA PLUS 8.6 MG/1
2 TABLET ORAL AT BEDTIME
Refills: 0 | Status: DISCONTINUED | OUTPATIENT
Start: 2022-01-01 | End: 2022-01-01

## 2022-01-01 RX ORDER — ACETAMINOPHEN 500 MG
650 TABLET ORAL EVERY 6 HOURS
Refills: 0 | Status: DISCONTINUED | OUTPATIENT
Start: 2022-01-01 | End: 2022-01-01

## 2022-01-01 RX ORDER — ACETAMINOPHEN 500 MG
2 TABLET ORAL
Qty: 0 | Refills: 0 | DISCHARGE
Start: 2022-01-01

## 2022-01-01 RX ORDER — SODIUM BICARBONATE 1 MEQ/ML
1 SYRINGE (ML) INTRAVENOUS
Qty: 0 | Refills: 0 | DISCHARGE
Start: 2022-01-01

## 2022-01-01 RX ORDER — ATORVASTATIN CALCIUM 80 MG/1
10 TABLET, FILM COATED ORAL AT BEDTIME
Refills: 0 | Status: DISCONTINUED | OUTPATIENT
Start: 2022-01-01 | End: 2022-01-01

## 2022-01-01 RX ORDER — APIXABAN 2.5 MG/1
1 TABLET, FILM COATED ORAL
Qty: 0 | Refills: 0 | DISCHARGE
Start: 2022-01-01

## 2022-01-01 RX ORDER — VANCOMYCIN HCL 1 G
1000 VIAL (EA) INTRAVENOUS ONCE
Refills: 0 | Status: COMPLETED | OUTPATIENT
Start: 2022-01-01 | End: 2022-01-01

## 2022-01-01 RX ORDER — DEXMEDETOMIDINE HYDROCHLORIDE IN 0.9% SODIUM CHLORIDE 4 UG/ML
0.2 INJECTION INTRAVENOUS
Qty: 200 | Refills: 0 | Status: DISCONTINUED | OUTPATIENT
Start: 2022-01-01 | End: 2022-01-01

## 2022-01-01 RX ORDER — HEPARIN SODIUM 5000 [USP'U]/ML
INJECTION INTRAVENOUS; SUBCUTANEOUS
Qty: 25000 | Refills: 0 | Status: DISCONTINUED | OUTPATIENT
Start: 2022-01-01 | End: 2022-01-01

## 2022-01-01 RX ORDER — APIXABAN 2.5 MG/1
0 TABLET, FILM COATED ORAL
Qty: 0 | Refills: 0 | DISCHARGE

## 2022-01-01 RX ORDER — SODIUM CHLORIDE 9 MG/ML
1000 INJECTION, SOLUTION INTRAVENOUS
Refills: 0 | Status: DISCONTINUED | OUTPATIENT
Start: 2022-01-01 | End: 2022-01-01

## 2022-01-01 RX ORDER — ISOSORBIDE MONONITRATE 60 MG/1
30 TABLET, EXTENDED RELEASE ORAL DAILY
Refills: 0 | Status: DISCONTINUED | OUTPATIENT
Start: 2022-01-01 | End: 2022-01-01

## 2022-01-01 RX ORDER — HYDRALAZINE HCL 50 MG
25 TABLET ORAL DAILY
Refills: 0 | Status: DISCONTINUED | OUTPATIENT
Start: 2022-01-01 | End: 2022-01-01

## 2022-01-01 RX ORDER — CHLORHEXIDINE GLUCONATE 213 G/1000ML
15 SOLUTION TOPICAL EVERY 12 HOURS
Refills: 0 | Status: DISCONTINUED | OUTPATIENT
Start: 2022-01-01 | End: 2022-01-01

## 2022-01-01 RX ORDER — MIDODRINE HYDROCHLORIDE 2.5 MG/1
10 TABLET ORAL EVERY 8 HOURS
Refills: 0 | Status: DISCONTINUED | OUTPATIENT
Start: 2022-01-01 | End: 2022-01-01

## 2022-01-01 RX ORDER — PROPOFOL 10 MG/ML
10.69 INJECTION, EMULSION INTRAVENOUS
Qty: 1000 | Refills: 0 | Status: DISCONTINUED | OUTPATIENT
Start: 2022-01-01 | End: 2022-01-01

## 2022-01-01 RX ORDER — HEPARIN SODIUM 5000 [USP'U]/ML
5000 INJECTION INTRAVENOUS; SUBCUTANEOUS EVERY 12 HOURS
Refills: 0 | Status: DISCONTINUED | OUTPATIENT
Start: 2022-01-01 | End: 2022-01-01

## 2022-01-01 RX ORDER — PROPOFOL 10 MG/ML
10 INJECTION, EMULSION INTRAVENOUS
Qty: 1000 | Refills: 0 | Status: DISCONTINUED | OUTPATIENT
Start: 2022-01-01 | End: 2022-01-01

## 2022-01-01 RX ORDER — ONDANSETRON 8 MG/1
4 TABLET, FILM COATED ORAL ONCE
Refills: 0 | Status: COMPLETED | OUTPATIENT
Start: 2022-01-01 | End: 2022-01-01

## 2022-01-01 RX ORDER — CEFEPIME 1 G/1
1000 INJECTION, POWDER, FOR SOLUTION INTRAMUSCULAR; INTRAVENOUS EVERY 24 HOURS
Refills: 0 | Status: DISCONTINUED | OUTPATIENT
Start: 2022-01-01 | End: 2022-01-01

## 2022-01-01 RX ORDER — PROPOFOL 10 MG/ML
60 INJECTION, EMULSION INTRAVENOUS
Qty: 1000 | Refills: 0 | Status: DISCONTINUED | OUTPATIENT
Start: 2022-01-01 | End: 2022-01-01

## 2022-01-01 RX ORDER — METOPROLOL TARTRATE 50 MG
25 TABLET ORAL
Refills: 0 | Status: DISCONTINUED | OUTPATIENT
Start: 2022-01-01 | End: 2022-01-01

## 2022-01-01 RX ORDER — ASPIRIN/CALCIUM CARB/MAGNESIUM 324 MG
81 TABLET ORAL DAILY
Refills: 0 | Status: DISCONTINUED | OUTPATIENT
Start: 2022-01-01 | End: 2022-01-01

## 2022-01-01 RX ORDER — TRAMADOL HYDROCHLORIDE 50 MG/1
25 TABLET ORAL EVERY 8 HOURS
Refills: 0 | Status: DISCONTINUED | OUTPATIENT
Start: 2022-01-01 | End: 2022-01-01

## 2022-01-01 RX ORDER — POLYETHYLENE GLYCOL 3350 17 G/17G
17 POWDER, FOR SOLUTION ORAL DAILY
Refills: 0 | Status: DISCONTINUED | OUTPATIENT
Start: 2022-01-01 | End: 2022-01-01

## 2022-01-01 RX ORDER — ISOSORBIDE MONONITRATE 60 MG/1
1 TABLET, EXTENDED RELEASE ORAL
Qty: 0 | Refills: 0 | DISCHARGE

## 2022-01-01 RX ORDER — CEFEPIME 1 G/1
1000 INJECTION, POWDER, FOR SOLUTION INTRAMUSCULAR; INTRAVENOUS ONCE
Refills: 0 | Status: COMPLETED | OUTPATIENT
Start: 2022-01-01 | End: 2022-01-01

## 2022-01-01 RX ORDER — METOCLOPRAMIDE HCL 10 MG
5 TABLET ORAL EVERY 8 HOURS
Refills: 0 | Status: DISCONTINUED | OUTPATIENT
Start: 2022-01-01 | End: 2022-01-01

## 2022-01-01 RX ORDER — POTASSIUM CHLORIDE 20 MEQ
10 PACKET (EA) ORAL
Refills: 0 | Status: COMPLETED | OUTPATIENT
Start: 2022-01-01 | End: 2022-01-01

## 2022-01-01 RX ORDER — ONDANSETRON 8 MG/1
4 TABLET, FILM COATED ORAL EVERY 8 HOURS
Refills: 0 | Status: DISCONTINUED | OUTPATIENT
Start: 2022-01-01 | End: 2022-01-01

## 2022-01-01 RX ORDER — VALPROIC ACID (AS SODIUM SALT) 250 MG/5ML
1200 SOLUTION, ORAL ORAL ONCE
Refills: 0 | Status: COMPLETED | OUTPATIENT
Start: 2022-01-01 | End: 2022-01-01

## 2022-01-01 RX ORDER — HYDRALAZINE HCL 50 MG
0 TABLET ORAL
Qty: 0 | Refills: 0 | DISCHARGE

## 2022-01-01 RX ORDER — HEPARIN SODIUM 5000 [USP'U]/ML
5000 INJECTION INTRAVENOUS; SUBCUTANEOUS EVERY 6 HOURS
Refills: 0 | Status: DISCONTINUED | OUTPATIENT
Start: 2022-01-01 | End: 2022-01-01

## 2022-01-01 RX ORDER — CEFEPIME 1 G/1
INJECTION, POWDER, FOR SOLUTION INTRAMUSCULAR; INTRAVENOUS
Refills: 0 | Status: DISCONTINUED | OUTPATIENT
Start: 2022-01-01 | End: 2022-01-01

## 2022-01-01 RX ORDER — VALPROIC ACID (AS SODIUM SALT) 250 MG/5ML
500 SOLUTION, ORAL ORAL EVERY 8 HOURS
Refills: 0 | Status: DISCONTINUED | OUTPATIENT
Start: 2022-01-01 | End: 2022-01-01

## 2022-01-01 RX ORDER — CHLORHEXIDINE GLUCONATE 213 G/1000ML
1 SOLUTION TOPICAL
Refills: 0 | Status: DISCONTINUED | OUTPATIENT
Start: 2022-01-01 | End: 2022-01-01

## 2022-01-01 RX ORDER — VANCOMYCIN HCL 1 G
1500 VIAL (EA) INTRAVENOUS ONCE
Refills: 0 | Status: DISCONTINUED | OUTPATIENT
Start: 2022-01-01 | End: 2022-01-01

## 2022-01-01 RX ORDER — NOREPINEPHRINE BITARTRATE/D5W 8 MG/250ML
0.05 PLASTIC BAG, INJECTION (ML) INTRAVENOUS
Qty: 8 | Refills: 0 | Status: DISCONTINUED | OUTPATIENT
Start: 2022-01-01 | End: 2022-01-01

## 2022-01-01 RX ORDER — MORPHINE SULFATE 50 MG/1
2 CAPSULE, EXTENDED RELEASE ORAL ONCE
Refills: 0 | Status: DISCONTINUED | OUTPATIENT
Start: 2022-01-01 | End: 2022-01-01

## 2022-01-01 RX ORDER — POTASSIUM CHLORIDE 20 MEQ
20 PACKET (EA) ORAL ONCE
Refills: 0 | Status: COMPLETED | OUTPATIENT
Start: 2022-01-01 | End: 2022-01-01

## 2022-01-01 RX ORDER — APIXABAN 2.5 MG/1
2.5 TABLET, FILM COATED ORAL
Refills: 0 | Status: DISCONTINUED | OUTPATIENT
Start: 2022-01-01 | End: 2022-01-01

## 2022-01-01 RX ORDER — PROPOFOL 10 MG/ML
20 INJECTION, EMULSION INTRAVENOUS ONCE
Refills: 0 | Status: COMPLETED | OUTPATIENT
Start: 2022-01-01 | End: 2022-01-01

## 2022-01-01 RX ORDER — SODIUM BICARBONATE 1 MEQ/ML
650 SYRINGE (ML) INTRAVENOUS
Refills: 0 | Status: DISCONTINUED | OUTPATIENT
Start: 2022-01-01 | End: 2022-01-01

## 2022-01-01 RX ADMIN — PROPOFOL 5.44 MICROGRAM(S)/KG/MIN: 10 INJECTION, EMULSION INTRAVENOUS at 20:41

## 2022-01-01 RX ADMIN — Medication 27.5 MILLIGRAM(S): at 14:32

## 2022-01-01 RX ADMIN — PROPOFOL 5.44 MICROGRAM(S)/KG/MIN: 10 INJECTION, EMULSION INTRAVENOUS at 07:04

## 2022-01-01 RX ADMIN — PIPERACILLIN AND TAZOBACTAM 200 GRAM(S): 4; .5 INJECTION, POWDER, LYOPHILIZED, FOR SOLUTION INTRAVENOUS at 05:45

## 2022-01-01 RX ADMIN — Medication 650 MILLIGRAM(S): at 07:02

## 2022-01-01 RX ADMIN — Medication 1 DROP(S): at 00:33

## 2022-01-01 RX ADMIN — Medication 1 DROP(S): at 23:19

## 2022-01-01 RX ADMIN — Medication 1 DROP(S): at 17:30

## 2022-01-01 RX ADMIN — Medication 81 MILLIGRAM(S): at 12:13

## 2022-01-01 RX ADMIN — PANTOPRAZOLE SODIUM 40 MILLIGRAM(S): 20 TABLET, DELAYED RELEASE ORAL at 11:27

## 2022-01-01 RX ADMIN — PROPOFOL 30.5 MICROGRAM(S)/KG/MIN: 10 INJECTION, EMULSION INTRAVENOUS at 13:24

## 2022-01-01 RX ADMIN — MIDODRINE HYDROCHLORIDE 10 MILLIGRAM(S): 2.5 TABLET ORAL at 13:39

## 2022-01-01 RX ADMIN — Medication 81 MILLIGRAM(S): at 14:28

## 2022-01-01 RX ADMIN — PROPOFOL 30.5 MICROGRAM(S)/KG/MIN: 10 INJECTION, EMULSION INTRAVENOUS at 15:53

## 2022-01-01 RX ADMIN — Medication 27.5 MILLIGRAM(S): at 05:12

## 2022-01-01 RX ADMIN — PROPOFOL 30.5 MICROGRAM(S)/KG/MIN: 10 INJECTION, EMULSION INTRAVENOUS at 05:05

## 2022-01-01 RX ADMIN — PIPERACILLIN AND TAZOBACTAM 200 GRAM(S): 4; .5 INJECTION, POWDER, LYOPHILIZED, FOR SOLUTION INTRAVENOUS at 18:13

## 2022-01-01 RX ADMIN — PROPOFOL 30.5 MICROGRAM(S)/KG/MIN: 10 INJECTION, EMULSION INTRAVENOUS at 19:13

## 2022-01-01 RX ADMIN — Medication 27.5 MILLIGRAM(S): at 22:38

## 2022-01-01 RX ADMIN — Medication 1 DROP(S): at 05:13

## 2022-01-01 RX ADMIN — CHLORHEXIDINE GLUCONATE 15 MILLILITER(S): 213 SOLUTION TOPICAL at 06:01

## 2022-01-01 RX ADMIN — CEFEPIME 100 MILLIGRAM(S): 1 INJECTION, POWDER, FOR SOLUTION INTRAMUSCULAR; INTRAVENOUS at 14:25

## 2022-01-01 RX ADMIN — Medication 81 MILLIGRAM(S): at 11:55

## 2022-01-01 RX ADMIN — Medication 7.95 MICROGRAM(S)/KG/MIN: at 06:41

## 2022-01-01 RX ADMIN — HEPARIN SODIUM 1150 UNIT(S)/HR: 5000 INJECTION INTRAVENOUS; SUBCUTANEOUS at 07:13

## 2022-01-01 RX ADMIN — Medication 1 DROP(S): at 23:15

## 2022-01-01 RX ADMIN — ISOSORBIDE MONONITRATE 30 MILLIGRAM(S): 60 TABLET, EXTENDED RELEASE ORAL at 12:14

## 2022-01-01 RX ADMIN — SODIUM CHLORIDE 30 MILLILITER(S): 9 INJECTION, SOLUTION INTRAVENOUS at 05:10

## 2022-01-01 RX ADMIN — Medication 650 MILLIGRAM(S): at 18:12

## 2022-01-01 RX ADMIN — Medication 1 DROP(S): at 05:04

## 2022-01-01 RX ADMIN — Medication 5 MILLIGRAM(S): at 06:01

## 2022-01-01 RX ADMIN — Medication 27.5 MILLIGRAM(S): at 21:20

## 2022-01-01 RX ADMIN — CHLORHEXIDINE GLUCONATE 1 APPLICATION(S): 213 SOLUTION TOPICAL at 16:01

## 2022-01-01 RX ADMIN — Medication 650 MILLIGRAM(S): at 06:35

## 2022-01-01 RX ADMIN — CHLORHEXIDINE GLUCONATE 1 APPLICATION(S): 213 SOLUTION TOPICAL at 06:06

## 2022-01-01 RX ADMIN — MIDAZOLAM HYDROCHLORIDE 2 MILLIGRAM(S): 1 INJECTION, SOLUTION INTRAMUSCULAR; INTRAVENOUS at 15:12

## 2022-01-01 RX ADMIN — CHLORHEXIDINE GLUCONATE 15 MILLILITER(S): 213 SOLUTION TOPICAL at 17:36

## 2022-01-01 RX ADMIN — PANTOPRAZOLE SODIUM 40 MILLIGRAM(S): 20 TABLET, DELAYED RELEASE ORAL at 13:47

## 2022-01-01 RX ADMIN — APIXABAN 2.5 MILLIGRAM(S): 2.5 TABLET, FILM COATED ORAL at 18:01

## 2022-01-01 RX ADMIN — Medication 1 DROP(S): at 06:05

## 2022-01-01 RX ADMIN — CHLORHEXIDINE GLUCONATE 1 APPLICATION(S): 213 SOLUTION TOPICAL at 05:08

## 2022-01-01 RX ADMIN — PROPOFOL 30.5 MICROGRAM(S)/KG/MIN: 10 INJECTION, EMULSION INTRAVENOUS at 19:35

## 2022-01-01 RX ADMIN — Medication 1 DROP(S): at 18:01

## 2022-01-01 RX ADMIN — CEFEPIME 100 MILLIGRAM(S): 1 INJECTION, POWDER, FOR SOLUTION INTRAMUSCULAR; INTRAVENOUS at 13:33

## 2022-01-01 RX ADMIN — AMLODIPINE BESYLATE 10 MILLIGRAM(S): 2.5 TABLET ORAL at 05:59

## 2022-01-01 RX ADMIN — CHLORHEXIDINE GLUCONATE 15 MILLILITER(S): 213 SOLUTION TOPICAL at 17:30

## 2022-01-01 RX ADMIN — Medication 650 MILLIGRAM(S): at 05:45

## 2022-01-01 RX ADMIN — CHLORHEXIDINE GLUCONATE 1 APPLICATION(S): 213 SOLUTION TOPICAL at 06:08

## 2022-01-01 RX ADMIN — Medication 7.95 MICROGRAM(S)/KG/MIN: at 09:21

## 2022-01-01 RX ADMIN — ISOSORBIDE MONONITRATE 30 MILLIGRAM(S): 60 TABLET, EXTENDED RELEASE ORAL at 16:16

## 2022-01-01 RX ADMIN — PROPOFOL 30.5 MICROGRAM(S)/KG/MIN: 10 INJECTION, EMULSION INTRAVENOUS at 16:29

## 2022-01-01 RX ADMIN — Medication 7.95 MICROGRAM(S)/KG/MIN: at 06:22

## 2022-01-01 RX ADMIN — CHLORHEXIDINE GLUCONATE 1 APPLICATION(S): 213 SOLUTION TOPICAL at 06:02

## 2022-01-01 RX ADMIN — CHLORHEXIDINE GLUCONATE 15 MILLILITER(S): 213 SOLUTION TOPICAL at 05:12

## 2022-01-01 RX ADMIN — PANTOPRAZOLE SODIUM 40 MILLIGRAM(S): 20 TABLET, DELAYED RELEASE ORAL at 11:29

## 2022-01-01 RX ADMIN — AMLODIPINE BESYLATE 10 MILLIGRAM(S): 2.5 TABLET ORAL at 16:16

## 2022-01-01 RX ADMIN — Medication 650 MILLIGRAM(S): at 17:53

## 2022-01-01 RX ADMIN — PROPOFOL 30.5 MICROGRAM(S)/KG/MIN: 10 INJECTION, EMULSION INTRAVENOUS at 04:23

## 2022-01-01 RX ADMIN — DEXMEDETOMIDINE HYDROCHLORIDE IN 0.9% SODIUM CHLORIDE 4.54 MICROGRAM(S)/KG/HR: 4 INJECTION INTRAVENOUS at 15:12

## 2022-01-01 RX ADMIN — Medication 25 MILLIGRAM(S): at 17:05

## 2022-01-01 RX ADMIN — HEPARIN SODIUM 1150 UNIT(S)/HR: 5000 INJECTION INTRAVENOUS; SUBCUTANEOUS at 01:30

## 2022-01-01 RX ADMIN — ATORVASTATIN CALCIUM 10 MILLIGRAM(S): 80 TABLET, FILM COATED ORAL at 22:22

## 2022-01-01 RX ADMIN — PROPOFOL 30.5 MICROGRAM(S)/KG/MIN: 10 INJECTION, EMULSION INTRAVENOUS at 00:22

## 2022-01-01 RX ADMIN — PIPERACILLIN AND TAZOBACTAM 200 GRAM(S): 4; .5 INJECTION, POWDER, LYOPHILIZED, FOR SOLUTION INTRAVENOUS at 18:09

## 2022-01-01 RX ADMIN — Medication 7.95 MICROGRAM(S)/KG/MIN: at 10:24

## 2022-01-01 RX ADMIN — PROPOFOL 30.5 MICROGRAM(S)/KG/MIN: 10 INJECTION, EMULSION INTRAVENOUS at 03:32

## 2022-01-01 RX ADMIN — Medication 5 MILLIGRAM(S): at 21:06

## 2022-01-01 RX ADMIN — MIDAZOLAM HYDROCHLORIDE 4.24 MG/KG/HR: 1 INJECTION, SOLUTION INTRAMUSCULAR; INTRAVENOUS at 23:03

## 2022-01-01 RX ADMIN — Medication 1 DROP(S): at 17:36

## 2022-01-01 RX ADMIN — Medication 81 MILLIGRAM(S): at 12:05

## 2022-01-01 RX ADMIN — PROPOFOL 5.44 MICROGRAM(S)/KG/MIN: 10 INJECTION, EMULSION INTRAVENOUS at 18:29

## 2022-01-01 RX ADMIN — HEPARIN SODIUM 1000 UNIT(S)/HR: 5000 INJECTION INTRAVENOUS; SUBCUTANEOUS at 18:36

## 2022-01-01 RX ADMIN — PIPERACILLIN AND TAZOBACTAM 200 GRAM(S): 4; .5 INJECTION, POWDER, LYOPHILIZED, FOR SOLUTION INTRAVENOUS at 00:37

## 2022-01-01 RX ADMIN — CHLORHEXIDINE GLUCONATE 1 APPLICATION(S): 213 SOLUTION TOPICAL at 12:16

## 2022-01-01 RX ADMIN — MIDODRINE HYDROCHLORIDE 10 MILLIGRAM(S): 2.5 TABLET ORAL at 21:06

## 2022-01-01 RX ADMIN — ATORVASTATIN CALCIUM 10 MILLIGRAM(S): 80 TABLET, FILM COATED ORAL at 21:54

## 2022-01-01 RX ADMIN — Medication 1 DROP(S): at 13:17

## 2022-01-01 RX ADMIN — CEFEPIME 100 MILLIGRAM(S): 1 INJECTION, POWDER, FOR SOLUTION INTRAMUSCULAR; INTRAVENOUS at 12:33

## 2022-01-01 RX ADMIN — PIPERACILLIN AND TAZOBACTAM 200 GRAM(S): 4; .5 INJECTION, POWDER, LYOPHILIZED, FOR SOLUTION INTRAVENOUS at 05:59

## 2022-01-01 RX ADMIN — Medication 7.95 MICROGRAM(S)/KG/MIN: at 07:10

## 2022-01-01 RX ADMIN — CHLORHEXIDINE GLUCONATE 15 MILLILITER(S): 213 SOLUTION TOPICAL at 17:48

## 2022-01-01 RX ADMIN — PROPOFOL 30.5 MICROGRAM(S)/KG/MIN: 10 INJECTION, EMULSION INTRAVENOUS at 14:42

## 2022-01-01 RX ADMIN — Medication 650 MILLIGRAM(S): at 17:23

## 2022-01-01 RX ADMIN — MIDODRINE HYDROCHLORIDE 10 MILLIGRAM(S): 2.5 TABLET ORAL at 21:29

## 2022-01-01 RX ADMIN — Medication 27.5 MILLIGRAM(S): at 14:42

## 2022-01-01 RX ADMIN — Medication 1 DROP(S): at 11:52

## 2022-01-01 RX ADMIN — PROPOFOL 5.44 MICROGRAM(S)/KG/MIN: 10 INJECTION, EMULSION INTRAVENOUS at 00:34

## 2022-01-01 RX ADMIN — Medication 5 MILLIGRAM(S): at 13:11

## 2022-01-01 RX ADMIN — MIDODRINE HYDROCHLORIDE 10 MILLIGRAM(S): 2.5 TABLET ORAL at 06:01

## 2022-01-01 RX ADMIN — PIPERACILLIN AND TAZOBACTAM 200 GRAM(S): 4; .5 INJECTION, POWDER, LYOPHILIZED, FOR SOLUTION INTRAVENOUS at 05:05

## 2022-01-01 RX ADMIN — Medication 20 MILLIEQUIVALENT(S): at 06:05

## 2022-01-01 RX ADMIN — PROPOFOL 30.5 MICROGRAM(S)/KG/MIN: 10 INJECTION, EMULSION INTRAVENOUS at 09:21

## 2022-01-01 RX ADMIN — PIPERACILLIN AND TAZOBACTAM 200 GRAM(S): 4; .5 INJECTION, POWDER, LYOPHILIZED, FOR SOLUTION INTRAVENOUS at 17:39

## 2022-01-01 RX ADMIN — Medication 27.5 MILLIGRAM(S): at 21:06

## 2022-01-01 RX ADMIN — PROPOFOL 30.5 MICROGRAM(S)/KG/MIN: 10 INJECTION, EMULSION INTRAVENOUS at 20:35

## 2022-01-01 RX ADMIN — Medication 81 MILLIGRAM(S): at 14:10

## 2022-01-01 RX ADMIN — Medication 7.95 MICROGRAM(S)/KG/MIN: at 08:22

## 2022-01-01 RX ADMIN — ISOSORBIDE MONONITRATE 30 MILLIGRAM(S): 60 TABLET, EXTENDED RELEASE ORAL at 11:54

## 2022-01-01 RX ADMIN — MIDAZOLAM HYDROCHLORIDE 5.94 MG/KG/HR: 1 INJECTION, SOLUTION INTRAMUSCULAR; INTRAVENOUS at 08:22

## 2022-01-01 RX ADMIN — Medication 27.5 MILLIGRAM(S): at 06:01

## 2022-01-01 RX ADMIN — IOHEXOL 30 MILLILITER(S): 300 INJECTION, SOLUTION INTRAVENOUS at 21:59

## 2022-01-01 RX ADMIN — Medication 1 DROP(S): at 23:05

## 2022-01-01 RX ADMIN — SENNA PLUS 2 TABLET(S): 8.6 TABLET ORAL at 21:29

## 2022-01-01 RX ADMIN — Medication 7.95 MICROGRAM(S)/KG/MIN: at 06:50

## 2022-01-01 RX ADMIN — PROPOFOL 30.5 MICROGRAM(S)/KG/MIN: 10 INJECTION, EMULSION INTRAVENOUS at 22:40

## 2022-01-01 RX ADMIN — TRAMADOL HYDROCHLORIDE 50 MILLIGRAM(S): 50 TABLET ORAL at 22:21

## 2022-01-01 RX ADMIN — ATORVASTATIN CALCIUM 10 MILLIGRAM(S): 80 TABLET, FILM COATED ORAL at 22:21

## 2022-01-01 RX ADMIN — SODIUM CHLORIDE 1000 MILLILITER(S): 9 INJECTION INTRAMUSCULAR; INTRAVENOUS; SUBCUTANEOUS at 11:05

## 2022-01-01 RX ADMIN — TRAMADOL HYDROCHLORIDE 50 MILLIGRAM(S): 50 TABLET ORAL at 00:55

## 2022-01-01 RX ADMIN — PIPERACILLIN AND TAZOBACTAM 200 GRAM(S): 4; .5 INJECTION, POWDER, LYOPHILIZED, FOR SOLUTION INTRAVENOUS at 17:05

## 2022-01-01 RX ADMIN — PROPOFOL 30.5 MICROGRAM(S)/KG/MIN: 10 INJECTION, EMULSION INTRAVENOUS at 10:11

## 2022-01-01 RX ADMIN — ATORVASTATIN CALCIUM 10 MILLIGRAM(S): 80 TABLET, FILM COATED ORAL at 22:13

## 2022-01-01 RX ADMIN — ONDANSETRON 4 MILLIGRAM(S): 8 TABLET, FILM COATED ORAL at 09:25

## 2022-01-01 RX ADMIN — PANTOPRAZOLE SODIUM 40 MILLIGRAM(S): 20 TABLET, DELAYED RELEASE ORAL at 11:37

## 2022-01-01 RX ADMIN — PANTOPRAZOLE SODIUM 40 MILLIGRAM(S): 20 TABLET, DELAYED RELEASE ORAL at 11:52

## 2022-01-01 RX ADMIN — PIPERACILLIN AND TAZOBACTAM 200 GRAM(S): 4; .5 INJECTION, POWDER, LYOPHILIZED, FOR SOLUTION INTRAVENOUS at 06:03

## 2022-01-01 RX ADMIN — CHLORHEXIDINE GLUCONATE 15 MILLILITER(S): 213 SOLUTION TOPICAL at 05:04

## 2022-01-01 RX ADMIN — CHLORHEXIDINE GLUCONATE 15 MILLILITER(S): 213 SOLUTION TOPICAL at 06:08

## 2022-01-01 RX ADMIN — PROPOFOL 30.5 MICROGRAM(S)/KG/MIN: 10 INJECTION, EMULSION INTRAVENOUS at 07:39

## 2022-01-01 RX ADMIN — POLYETHYLENE GLYCOL 3350 17 GRAM(S): 17 POWDER, FOR SOLUTION ORAL at 11:52

## 2022-01-01 RX ADMIN — Medication 7.95 MICROGRAM(S)/KG/MIN: at 20:06

## 2022-01-01 RX ADMIN — DEXMEDETOMIDINE HYDROCHLORIDE IN 0.9% SODIUM CHLORIDE 4.24 MICROGRAM(S)/KG/HR: 4 INJECTION INTRAVENOUS at 17:10

## 2022-01-01 RX ADMIN — Medication 1 DROP(S): at 05:05

## 2022-01-01 RX ADMIN — DEXMEDETOMIDINE HYDROCHLORIDE IN 0.9% SODIUM CHLORIDE 4.24 MICROGRAM(S)/KG/HR: 4 INJECTION INTRAVENOUS at 22:36

## 2022-01-01 RX ADMIN — PROPOFOL 5.44 MICROGRAM(S)/KG/MIN: 10 INJECTION, EMULSION INTRAVENOUS at 16:01

## 2022-01-01 RX ADMIN — Medication 650 MILLIGRAM(S): at 17:05

## 2022-01-01 RX ADMIN — Medication 5 MILLIGRAM(S): at 21:29

## 2022-01-01 RX ADMIN — TRAMADOL HYDROCHLORIDE 50 MILLIGRAM(S): 50 TABLET ORAL at 22:22

## 2022-01-01 RX ADMIN — ISOSORBIDE MONONITRATE 30 MILLIGRAM(S): 60 TABLET, EXTENDED RELEASE ORAL at 13:20

## 2022-01-01 RX ADMIN — Medication 7.95 MICROGRAM(S)/KG/MIN: at 22:27

## 2022-01-01 RX ADMIN — CHLORHEXIDINE GLUCONATE 15 MILLILITER(S): 213 SOLUTION TOPICAL at 17:31

## 2022-01-01 RX ADMIN — HEPARIN SODIUM 1150 UNIT(S)/HR: 5000 INJECTION INTRAVENOUS; SUBCUTANEOUS at 08:10

## 2022-01-01 RX ADMIN — Medication 25 MILLIGRAM(S): at 18:01

## 2022-01-01 RX ADMIN — PROPOFOL 30.5 MICROGRAM(S)/KG/MIN: 10 INJECTION, EMULSION INTRAVENOUS at 23:34

## 2022-01-01 RX ADMIN — PROPOFOL 20 MILLIGRAM(S): 10 INJECTION, EMULSION INTRAVENOUS at 13:30

## 2022-01-01 RX ADMIN — Medication 100 MILLIEQUIVALENT(S): at 12:53

## 2022-01-01 RX ADMIN — MORPHINE SULFATE 2 MILLIGRAM(S): 50 CAPSULE, EXTENDED RELEASE ORAL at 11:28

## 2022-01-01 RX ADMIN — DEXMEDETOMIDINE HYDROCHLORIDE IN 0.9% SODIUM CHLORIDE 4.24 MICROGRAM(S)/KG/HR: 4 INJECTION INTRAVENOUS at 21:39

## 2022-01-01 RX ADMIN — Medication 650 MILLIGRAM(S): at 18:02

## 2022-01-01 RX ADMIN — Medication 650 MILLIGRAM(S): at 05:04

## 2022-01-01 RX ADMIN — PROPOFOL 5.44 MICROGRAM(S)/KG/MIN: 10 INJECTION, EMULSION INTRAVENOUS at 13:52

## 2022-01-01 RX ADMIN — Medication 27.5 MILLIGRAM(S): at 05:04

## 2022-01-01 RX ADMIN — MIDODRINE HYDROCHLORIDE 10 MILLIGRAM(S): 2.5 TABLET ORAL at 05:04

## 2022-01-01 RX ADMIN — PROPOFOL 5.44 MICROGRAM(S)/KG/MIN: 10 INJECTION, EMULSION INTRAVENOUS at 01:34

## 2022-01-01 RX ADMIN — PROPOFOL 30.5 MICROGRAM(S)/KG/MIN: 10 INJECTION, EMULSION INTRAVENOUS at 01:46

## 2022-01-01 RX ADMIN — PANTOPRAZOLE SODIUM 40 MILLIGRAM(S): 20 TABLET, DELAYED RELEASE ORAL at 12:14

## 2022-01-01 RX ADMIN — PROPOFOL 30.5 MICROGRAM(S)/KG/MIN: 10 INJECTION, EMULSION INTRAVENOUS at 08:22

## 2022-01-01 RX ADMIN — MIDAZOLAM HYDROCHLORIDE 5.94 MG/KG/HR: 1 INJECTION, SOLUTION INTRAMUSCULAR; INTRAVENOUS at 02:25

## 2022-01-01 RX ADMIN — SODIUM CHLORIDE 30 MILLILITER(S): 9 INJECTION, SOLUTION INTRAVENOUS at 05:45

## 2022-01-01 RX ADMIN — Medication 81 MILLIGRAM(S): at 12:14

## 2022-01-01 RX ADMIN — PROPOFOL 30.5 MICROGRAM(S)/KG/MIN: 10 INJECTION, EMULSION INTRAVENOUS at 12:03

## 2022-01-01 RX ADMIN — Medication 650 MILLIGRAM(S): at 17:35

## 2022-01-01 RX ADMIN — MORPHINE SULFATE 2 MILLIGRAM(S): 50 CAPSULE, EXTENDED RELEASE ORAL at 11:25

## 2022-01-01 RX ADMIN — CHLORHEXIDINE GLUCONATE 15 MILLILITER(S): 213 SOLUTION TOPICAL at 05:08

## 2022-01-01 RX ADMIN — MIDODRINE HYDROCHLORIDE 10 MILLIGRAM(S): 2.5 TABLET ORAL at 14:32

## 2022-01-01 RX ADMIN — PROPOFOL 30.5 MICROGRAM(S)/KG/MIN: 10 INJECTION, EMULSION INTRAVENOUS at 07:10

## 2022-01-01 RX ADMIN — HEPARIN SODIUM 950 UNIT(S)/HR: 5000 INJECTION INTRAVENOUS; SUBCUTANEOUS at 21:52

## 2022-01-01 RX ADMIN — PROPOFOL 30.5 MICROGRAM(S)/KG/MIN: 10 INJECTION, EMULSION INTRAVENOUS at 10:01

## 2022-01-01 RX ADMIN — Medication 7.95 MICROGRAM(S)/KG/MIN: at 23:14

## 2022-01-01 RX ADMIN — CHLORHEXIDINE GLUCONATE 15 MILLILITER(S): 213 SOLUTION TOPICAL at 17:22

## 2022-01-01 RX ADMIN — Medication 1 DROP(S): at 23:02

## 2022-01-01 RX ADMIN — ISOSORBIDE MONONITRATE 30 MILLIGRAM(S): 60 TABLET, EXTENDED RELEASE ORAL at 14:10

## 2022-01-01 RX ADMIN — DEXMEDETOMIDINE HYDROCHLORIDE IN 0.9% SODIUM CHLORIDE 4.24 MICROGRAM(S)/KG/HR: 4 INJECTION INTRAVENOUS at 07:12

## 2022-01-01 RX ADMIN — Medication 27.5 MILLIGRAM(S): at 13:24

## 2022-01-01 RX ADMIN — Medication 27.5 MILLIGRAM(S): at 21:05

## 2022-01-01 RX ADMIN — PROPOFOL 30.5 MICROGRAM(S)/KG/MIN: 10 INJECTION, EMULSION INTRAVENOUS at 21:28

## 2022-01-01 RX ADMIN — ISOSORBIDE MONONITRATE 30 MILLIGRAM(S): 60 TABLET, EXTENDED RELEASE ORAL at 12:13

## 2022-01-01 RX ADMIN — Medication 650 MILLIGRAM(S): at 23:20

## 2022-01-01 RX ADMIN — CHLORHEXIDINE GLUCONATE 1 APPLICATION(S): 213 SOLUTION TOPICAL at 05:13

## 2022-01-01 RX ADMIN — PANTOPRAZOLE SODIUM 40 MILLIGRAM(S): 20 TABLET, DELAYED RELEASE ORAL at 11:32

## 2022-01-01 RX ADMIN — MIDAZOLAM HYDROCHLORIDE 5.94 MG/KG/HR: 1 INJECTION, SOLUTION INTRAMUSCULAR; INTRAVENOUS at 16:06

## 2022-01-01 RX ADMIN — CHLORHEXIDINE GLUCONATE 1 APPLICATION(S): 213 SOLUTION TOPICAL at 05:05

## 2022-01-01 RX ADMIN — Medication 100 MILLIEQUIVALENT(S): at 16:15

## 2022-01-01 RX ADMIN — Medication 1 DROP(S): at 17:09

## 2022-01-01 RX ADMIN — Medication 7.95 MICROGRAM(S)/KG/MIN: at 14:54

## 2022-01-01 RX ADMIN — Medication 27.5 MILLIGRAM(S): at 21:31

## 2022-01-01 RX ADMIN — Medication 1 DROP(S): at 12:15

## 2022-01-01 RX ADMIN — SENNA PLUS 2 TABLET(S): 8.6 TABLET ORAL at 21:05

## 2022-01-01 RX ADMIN — Medication 25 MILLIGRAM(S): at 05:59

## 2022-01-01 RX ADMIN — Medication 5 MILLIGRAM(S): at 05:08

## 2022-01-01 RX ADMIN — Medication 25 MILLIGRAM(S): at 17:53

## 2022-01-01 RX ADMIN — DEXMEDETOMIDINE HYDROCHLORIDE IN 0.9% SODIUM CHLORIDE 4.54 MICROGRAM(S)/KG/HR: 4 INJECTION INTRAVENOUS at 14:05

## 2022-01-01 RX ADMIN — CHLORHEXIDINE GLUCONATE 1 APPLICATION(S): 213 SOLUTION TOPICAL at 05:23

## 2022-01-01 RX ADMIN — ATORVASTATIN CALCIUM 10 MILLIGRAM(S): 80 TABLET, FILM COATED ORAL at 21:52

## 2022-01-01 RX ADMIN — APIXABAN 2.5 MILLIGRAM(S): 2.5 TABLET, FILM COATED ORAL at 18:13

## 2022-01-01 RX ADMIN — PROPOFOL 5.44 MICROGRAM(S)/KG/MIN: 10 INJECTION, EMULSION INTRAVENOUS at 07:12

## 2022-01-01 RX ADMIN — CHLORHEXIDINE GLUCONATE 15 MILLILITER(S): 213 SOLUTION TOPICAL at 05:05

## 2022-01-01 RX ADMIN — ATORVASTATIN CALCIUM 10 MILLIGRAM(S): 80 TABLET, FILM COATED ORAL at 21:33

## 2022-01-01 RX ADMIN — CHLORHEXIDINE GLUCONATE 15 MILLILITER(S): 213 SOLUTION TOPICAL at 17:44

## 2022-01-01 RX ADMIN — PROPOFOL 5.44 MICROGRAM(S)/KG/MIN: 10 INJECTION, EMULSION INTRAVENOUS at 16:51

## 2022-01-01 RX ADMIN — Medication 650 MILLIGRAM(S): at 00:34

## 2022-01-01 RX ADMIN — Medication 650 MILLIGRAM(S): at 18:08

## 2022-01-01 RX ADMIN — Medication 650 MILLIGRAM(S): at 17:56

## 2022-01-01 RX ADMIN — Medication 27.5 MILLIGRAM(S): at 13:40

## 2022-01-01 RX ADMIN — PIPERACILLIN AND TAZOBACTAM 200 GRAM(S): 4; .5 INJECTION, POWDER, LYOPHILIZED, FOR SOLUTION INTRAVENOUS at 18:01

## 2022-01-01 RX ADMIN — CHLORHEXIDINE GLUCONATE 15 MILLILITER(S): 213 SOLUTION TOPICAL at 06:05

## 2022-01-01 RX ADMIN — HEPARIN SODIUM 1050 UNIT(S)/HR: 5000 INJECTION INTRAVENOUS; SUBCUTANEOUS at 15:45

## 2022-01-01 RX ADMIN — Medication 1 DROP(S): at 11:37

## 2022-01-01 RX ADMIN — CHLORHEXIDINE GLUCONATE 15 MILLILITER(S): 213 SOLUTION TOPICAL at 05:23

## 2022-01-01 RX ADMIN — PROPOFOL 30.5 MICROGRAM(S)/KG/MIN: 10 INJECTION, EMULSION INTRAVENOUS at 17:36

## 2022-01-01 RX ADMIN — ATORVASTATIN CALCIUM 10 MILLIGRAM(S): 80 TABLET, FILM COATED ORAL at 22:18

## 2022-01-01 RX ADMIN — MIDAZOLAM HYDROCHLORIDE 5.94 MG/KG/HR: 1 INJECTION, SOLUTION INTRAMUSCULAR; INTRAVENOUS at 09:21

## 2022-01-01 RX ADMIN — TRAMADOL HYDROCHLORIDE 50 MILLIGRAM(S): 50 TABLET ORAL at 23:11

## 2022-01-01 RX ADMIN — ONDANSETRON 4 MILLIGRAM(S): 8 TABLET, FILM COATED ORAL at 21:53

## 2022-01-01 RX ADMIN — PIPERACILLIN AND TAZOBACTAM 200 GRAM(S): 4; .5 INJECTION, POWDER, LYOPHILIZED, FOR SOLUTION INTRAVENOUS at 17:54

## 2022-01-01 RX ADMIN — Medication 7.95 MICROGRAM(S)/KG/MIN: at 22:38

## 2022-01-01 RX ADMIN — PROPOFOL 30.5 MICROGRAM(S)/KG/MIN: 10 INJECTION, EMULSION INTRAVENOUS at 13:11

## 2022-01-01 RX ADMIN — CEFEPIME 100 MILLIGRAM(S): 1 INJECTION, POWDER, FOR SOLUTION INTRAMUSCULAR; INTRAVENOUS at 12:18

## 2022-01-01 RX ADMIN — Medication 1 DROP(S): at 18:29

## 2022-01-01 RX ADMIN — PROPOFOL 30.5 MICROGRAM(S)/KG/MIN: 10 INJECTION, EMULSION INTRAVENOUS at 01:23

## 2022-01-01 RX ADMIN — AMLODIPINE BESYLATE 10 MILLIGRAM(S): 2.5 TABLET ORAL at 05:05

## 2022-01-01 RX ADMIN — CHLORHEXIDINE GLUCONATE 15 MILLILITER(S): 213 SOLUTION TOPICAL at 17:09

## 2022-01-01 RX ADMIN — Medication 27.5 MILLIGRAM(S): at 05:55

## 2022-01-01 RX ADMIN — Medication 27.5 MILLIGRAM(S): at 05:08

## 2022-01-01 RX ADMIN — CEFEPIME 100 MILLIGRAM(S): 1 INJECTION, POWDER, FOR SOLUTION INTRAMUSCULAR; INTRAVENOUS at 14:41

## 2022-01-01 RX ADMIN — Medication 650 MILLIGRAM(S): at 06:17

## 2022-01-01 RX ADMIN — Medication 1 DROP(S): at 06:08

## 2022-01-01 RX ADMIN — MIDODRINE HYDROCHLORIDE 10 MILLIGRAM(S): 2.5 TABLET ORAL at 21:05

## 2022-01-01 RX ADMIN — CHLORHEXIDINE GLUCONATE 15 MILLILITER(S): 213 SOLUTION TOPICAL at 18:29

## 2022-01-01 RX ADMIN — APIXABAN 2.5 MILLIGRAM(S): 2.5 TABLET, FILM COATED ORAL at 18:08

## 2022-01-01 RX ADMIN — Medication 1 DROP(S): at 11:29

## 2022-01-01 RX ADMIN — Medication 25 MILLIGRAM(S): at 16:16

## 2022-01-01 RX ADMIN — Medication 650 MILLIGRAM(S): at 05:59

## 2022-01-01 RX ADMIN — APIXABAN 2.5 MILLIGRAM(S): 2.5 TABLET, FILM COATED ORAL at 05:59

## 2022-01-01 RX ADMIN — PROPOFOL 30.5 MICROGRAM(S)/KG/MIN: 10 INJECTION, EMULSION INTRAVENOUS at 05:54

## 2022-01-01 RX ADMIN — Medication 25 MILLIGRAM(S): at 05:04

## 2022-01-01 RX ADMIN — Medication 81 MILLIGRAM(S): at 13:20

## 2022-01-01 RX ADMIN — MIDODRINE HYDROCHLORIDE 10 MILLIGRAM(S): 2.5 TABLET ORAL at 13:11

## 2022-01-01 RX ADMIN — PROPOFOL 30.5 MICROGRAM(S)/KG/MIN: 10 INJECTION, EMULSION INTRAVENOUS at 12:18

## 2022-01-01 RX ADMIN — HEPARIN SODIUM 1050 UNIT(S)/HR: 5000 INJECTION INTRAVENOUS; SUBCUTANEOUS at 14:33

## 2022-01-01 RX ADMIN — Medication 31 MILLIGRAM(S): at 13:25

## 2022-01-01 RX ADMIN — APIXABAN 2.5 MILLIGRAM(S): 2.5 TABLET, FILM COATED ORAL at 05:04

## 2022-01-01 RX ADMIN — Medication 100 MILLIEQUIVALENT(S): at 15:12

## 2022-01-01 RX ADMIN — PROPOFOL 30.5 MICROGRAM(S)/KG/MIN: 10 INJECTION, EMULSION INTRAVENOUS at 00:50

## 2022-01-01 RX ADMIN — Medication 7.95 MICROGRAM(S)/KG/MIN: at 22:40

## 2022-01-01 RX ADMIN — Medication 5 MILLIGRAM(S): at 13:39

## 2022-01-01 RX ADMIN — PIPERACILLIN AND TAZOBACTAM 200 GRAM(S): 4; .5 INJECTION, POWDER, LYOPHILIZED, FOR SOLUTION INTRAVENOUS at 05:10

## 2022-01-01 RX ADMIN — Medication 25 MILLIGRAM(S): at 18:08

## 2022-01-01 RX ADMIN — Medication 166.67 MILLIGRAM(S): at 06:08

## 2022-01-01 RX ADMIN — Medication 1 DROP(S): at 11:27

## 2022-01-01 RX ADMIN — CEFEPIME 1000 MILLIGRAM(S): 1 INJECTION, POWDER, FOR SOLUTION INTRAMUSCULAR; INTRAVENOUS at 13:46

## 2022-01-01 RX ADMIN — PROPOFOL 5.44 MICROGRAM(S)/KG/MIN: 10 INJECTION, EMULSION INTRAVENOUS at 06:05

## 2022-01-01 RX ADMIN — Medication 1 DROP(S): at 17:21

## 2022-01-01 RX ADMIN — Medication 1 DROP(S): at 06:01

## 2022-01-01 RX ADMIN — PIPERACILLIN AND TAZOBACTAM 200 GRAM(S): 4; .5 INJECTION, POWDER, LYOPHILIZED, FOR SOLUTION INTRAVENOUS at 06:35

## 2022-01-01 RX ADMIN — Medication 2 MILLIGRAM(S): at 22:27

## 2022-01-01 RX ADMIN — PIPERACILLIN AND TAZOBACTAM 200 GRAM(S): 4; .5 INJECTION, POWDER, LYOPHILIZED, FOR SOLUTION INTRAVENOUS at 17:56

## 2022-01-01 RX ADMIN — Medication 250 MILLIGRAM(S): at 15:31

## 2022-01-01 RX ADMIN — PROPOFOL 30.5 MICROGRAM(S)/KG/MIN: 10 INJECTION, EMULSION INTRAVENOUS at 04:43

## 2022-01-01 RX ADMIN — MIDODRINE HYDROCHLORIDE 10 MILLIGRAM(S): 2.5 TABLET ORAL at 05:12

## 2022-01-01 RX ADMIN — DEXMEDETOMIDINE HYDROCHLORIDE IN 0.9% SODIUM CHLORIDE 4.24 MICROGRAM(S)/KG/HR: 4 INJECTION INTRAVENOUS at 19:19

## 2022-01-01 RX ADMIN — Medication 1 DROP(S): at 05:09

## 2022-01-01 RX ADMIN — PROPOFOL 30.5 MICROGRAM(S)/KG/MIN: 10 INJECTION, EMULSION INTRAVENOUS at 04:24

## 2022-01-01 RX ADMIN — PROPOFOL 30.5 MICROGRAM(S)/KG/MIN: 10 INJECTION, EMULSION INTRAVENOUS at 23:39

## 2022-02-17 NOTE — ED ADULT NURSE NOTE - OBJECTIVE STATEMENT
Pt receieved on stretcher c/o nausea and vomiting that started 1 week ago, pt stated that he isnt getting any better, pt missed his dialysis sessions this week due to his condition.

## 2022-02-17 NOTE — ED ADULT NURSE NOTE - CHIEF COMPLAINT QUOTE
BIBA- from home weakness since this morning, N/VX4  missed Dialysis T/Th/Sat Left FA AV/fistula  Ambulatory on scene, walks with a cane  EMS  HX HTN, ESRD, PM on eliquis

## 2022-02-18 PROBLEM — E78.5 HYPERLIPIDEMIA, UNSPECIFIED: Chronic | Status: ACTIVE | Noted: 2019-10-28

## 2022-02-18 PROBLEM — N28.9 DISORDER OF KIDNEY AND URETER, UNSPECIFIED: Chronic | Status: ACTIVE | Noted: 2019-10-28

## 2022-02-18 PROBLEM — I63.9 CEREBRAL INFARCTION, UNSPECIFIED: Chronic | Status: ACTIVE | Noted: 2019-10-28

## 2022-02-18 NOTE — ED PROVIDER NOTE - OBJECTIVE STATEMENT
81 year old male h/o HTN, HLD, ESRD (on dialysis tues, thur, sat) presents today c/o abdominal pain and vomiting since last night, pt describes right sided abdominal pain 81 year old male h/o HTN, HLD, ESRD (on dialysis tues, thur, sat) presents today c/o abdominal pain and vomiting since last night, pt describes right sided abdominal pain rated 6/10, pt denies diarrhea, fevers or chills, chest pain, pt was due for dialysis today but unable to go due to his pain and vomiting

## 2022-02-18 NOTE — PROGRESS NOTE ADULT - ASSESSMENT
81 year old male w/ PMHx of HTN, HLD, ESRD T/TH/SAT, CVA, Atrial Fibrillation on Eliquis, PPM presents to the ED c/o abdominal pain. Pt reports he woke up Thursday morning w/ RUQ abdominal pain associated w/ persistent cough, n/v (NB/NB), gagging, SOB and wretching, pt being admitted for Acute Cholecystitis, Gallstone Pancreatitis.    # Acute Cholecystitis, Gallstone Pancreatitis - s/p IR percutaneous cholecystostomy 2/18.  - NPO for now.  c/w Zosyn, pain control.   Eventual Cholecystectomy per surgery  # ESRD on HD - HD per renal.  Dr Jerry following.  pt on HD, missed TH session, BP stable, LA wnl monitor off IVF (IVF per Nephro discretion)  # Essential HTN - Monitor BP.  Continue antihypertensives.  # Chronic Afib - continue rate control.  resume ASA, anticoagulation in AM pending surgery input.  # Inpatient DVT Prophylaxis - Lower extremity intermittent compression devices.

## 2022-02-18 NOTE — PROGRESS NOTE ADULT - SUBJECTIVE AND OBJECTIVE BOX
Patient: BRITNEY ROSEN [id number] 81y Male                            Hospitalist Attending Note    Mild RUQ pain.  overall feeling better.  S/p cholecystostomy      ____________________PHYSICAL EXAM:  GENERAL:  NAD Alert and Oriented x 3   HEENT: NCAT  CARDIOVASCULAR:  S1, S2  LUNGS: CTAB  ABDOMEN:  soft, (-) tenderness, (-) distension, (+) bowel sounds, (-) guarding, (-) rebound (-) rigidity.  RUQ cholecystostomy draining bile.   EXTREMITIES:  no cyanosis / clubbing / edema.   ____________________     VITALS:  Vital Signs Last 24 Hrs  T(C): 37.3 (2022 14:19), Max: 37.9 (2022 18:08)  T(F): 99.2 (2022 14:19), Max: 100.2 (2022 18:08)  HR: 74 (2022 14:19) (66 - 74)  BP: 156/64 (2022 14:19) (120/67 - 156/64)  BP(mean): --  RR: 17 (2022 14:19) (14 - 19)  SpO2: 97% (2022 14:19) (95% - 100%) Daily Height in cm: 185.42 (2022 18:08)    Daily Weight in k.1 (2022 05:08)  CAPILLARY BLOOD GLUCOSE        I&O's Summary    2022 07:01  -  2022 17:13  --------------------------------------------------------  IN: 0 mL / OUT: 300 mL / NET: -300 mL        HISTORY:  PAST MEDICAL & SURGICAL HISTORY:  Hypertension    Hyperlipidemia, unspecified hyperlipidemia type    Renal insufficiency    Cerebrovascular accident (CVA)    History of pacemaker    S/P hip replacement  BL ,     Allergies    No Known Allergies    Intolerances       LABS:                        11.8   15.67 )-----------( 144      ( 2022 06:28 )             37.8     02-18    138  |  104  |  60<H>  ----------------------------<  114<H>  4.0   |  26  |  7.66<H>    Ca    8.8      2022 06:28  Phos  5.6     02-  Mg     2.3         TPro  7.7  /  Alb  3.1<L>  /  TBili  1.3<H>  /  DBili  x   /  AST  16  /  ALT  11<L>  /  AlkPhos  67  02-18    PT/INR - ( 2022 06:28 )   PT: 17.1 sec;   INR: 1.50 ratio         PTT - ( 2022 06:28 )  PTT:35.2 sec  LIVER FUNCTIONS - ( 2022 06:28 )  Alb: 3.1 g/dL / Pro: 7.7 gm/dL / ALK PHOS: 67 U/L / ALT: 11 U/L / AST: 16 U/L / GGT: x                     MEDICATIONS:  MEDICATIONS  (STANDING):  amLODIPine   Tablet 10 milliGRAM(s) Oral daily  atorvastatin 10 milliGRAM(s) Oral at bedtime  dextrose 5% + sodium chloride 0.45%. 1000 milliLiter(s) (30 mL/Hr) IV Continuous <Continuous>  hydrALAZINE 25 milliGRAM(s) Oral daily  isosorbide   mononitrate ER Tablet (IMDUR) 30 milliGRAM(s) Oral daily  piperacillin/tazobactam IVPB... 3.375 Gram(s) IV Intermittent every 12 hours  sodium bicarbonate 650 milliGRAM(s) Oral two times a day    MEDICATIONS  (PRN):  acetaminophen     Tablet .. 650 milliGRAM(s) Oral every 6 hours PRN Temp greater or equal to 38C (100.4F), Mild Pain (1 - 3)  ondansetron Injectable 4 milliGRAM(s) IV Push every 8 hours PRN Nausea and/or Vomiting  traMADol 50 milliGRAM(s) Oral every 12 hours PRN Moderate Pain (4 - 6)

## 2022-02-18 NOTE — ED ADULT NURSE REASSESSMENT NOTE - NS ED NURSE REASSESS COMMENT FT1
Covering for Primary RN [place] for break. Pt sleeping in stretcher, no acute distress noted. Will continue to monitor.

## 2022-02-18 NOTE — PATIENT PROFILE ADULT - FALL HARM RISK - HARM RISK INTERVENTIONS

## 2022-02-18 NOTE — CONSULT NOTE ADULT - ASSESSMENT
80 y/o male PMHx ESRD on HD, AV fistula, pacemaker, CVA, HLD, HTN, polycystic kidney disease, afib, b/l JUSTIN, diastolic heart failure c/o nausea and RUQ pain yesterday. Acute cholecystitis with gallstone in the gallbladder neck.    NPO x meds  consent for perc denae in chart  will discuss with - seems appropriate for IR perc cholecystostomy  maintain npo  continue abx on zosyn

## 2022-02-18 NOTE — CONSULT NOTE ADULT - SUBJECTIVE AND OBJECTIVE BOX
Patient is a 81y old  Male who presents with a chief complaint of Acute Cholecystitis, Gallstone Pancreatitis (18 Feb 2022 03:12).Has been NPO x meds.       HPI:             81 year old male w/ PMHx of HTN, HLD, ESRD T/TH/SAT, CVA, Atrial Fibrillation on Eliquis, PPM presents to the ED c/o abdominal pain. Pt reports he woke up Thursday morning w/ RUQ abdominal pain associated w/ persistent cough, n/v (NB/NB), gagging, SOB and wretching. Pt describes pain as a persistent "knot" which didn't improve until arrival to the ED. Pt reports he missed HD due to his sxs. Pt denies fever chills, diarrhea or dysuria (still makes some urine).    In ED initial vitals stable, T max 100.2. Labs sig for WBC 16.11, BUN/Cr 52/7.37, Lipase 714, CT A/P showed Prominently distended gallbladder with gallstones and pericholecystic stranding. There is also mildly dense ascites in the right paracolic gutter and pelvis. The appendix is seen, within normal limits. However, the appendiceal tip is partially obscured by adjacent ascites in the right lower quadrant. Patchy opacity of the right lung base may represent atelectasis and/or infiltrate. Tiny right pleural effusion.  RUQ Abdominal US showed Distended gallbladder with nonmobile gallstone in the gallbladder neck   with mild gallbladder wall thickening. Findings suggest a high positive   predictive value for acute cholecystitis.    Pt given IV Zosyn, Surgery consulted (18 Feb 2022 02:50)      General:  No wt loss, fevers, chills, night sweats  Eyes:  Good vision, no reported pain  CV:  No pain, palpitations,   Resp:  No dyspnea, cough, tachypnea, wheezing  GI:  + RUQ pain, + nausea, no vomiting, diarrhea or constipation  :  No pain, bleeding, incontinence, nocturia  Muscle:  No pain, weakness  Neuro:  No weakness, tingling, memory problems  Psych:  No fatigue, insomnia, mood problems, depression  Endocrine:  No polyuria, polydypsia, cold/heat intolerance  Heme:  No petechiae, ecchymosis, easy bruisability  Skin:  No rash, tattoos, scars, edema    PAST MEDICAL & SURGICAL HISTORY:  Hypertension    Hyperlipidemia, unspecified hyperlipidemia type    Renal insufficiency    Cerebrovascular accident (CVA)    History of pacemaker    S/P hip replacement  BL 2010, 2011    Afib    heart failure     Allergies    No Known Allergies      MEDICATIONS  (STANDING):  amLODIPine   Tablet 10 milliGRAM(s) Oral daily  atorvastatin 10 milliGRAM(s) Oral at bedtime  dextrose 5% + sodium chloride 0.45%. 1000 milliLiter(s) (30 mL/Hr) IV Continuous <Continuous>  hydrALAZINE 25 milliGRAM(s) Oral daily  isosorbide   mononitrate ER Tablet (IMDUR) 30 milliGRAM(s) Oral daily  piperacillin/tazobactam IVPB... 3.375 Gram(s) IV Intermittent every 12 hours  sodium bicarbonate 650 milliGRAM(s) Oral two times a day    MEDICATIONS  (PRN):  acetaminophen     Tablet .. 650 milliGRAM(s) Oral every 6 hours PRN Temp greater or equal to 38C (100.4F), Mild Pain (1 - 3)  ondansetron Injectable 4 milliGRAM(s) IV Push every 8 hours PRN Nausea and/or Vomiting  traMADol 50 milliGRAM(s) Oral every 12 hours PRN Moderate Pain (4 - 6)        SOCIAL HISTORY: lives at home, has ESRD T/Th/Fr    FAMILY HISTORY:  No pertinent family history in first degree relatives    PHYSICAL EXAM:    Vital Signs Last 24 Hrs  T(C): 37.8 (18 Feb 2022 05:08), Max: 37.9 (17 Feb 2022 18:08)  T(F): 100.1 (18 Feb 2022 05:08), Max: 100.2 (17 Feb 2022 18:08)  HR: 69 (18 Feb 2022 05:08) (69 - 72)  BP: 146/79 (18 Feb 2022 05:08) (145/72 - 149/80)  BP(mean): --  RR: 14 (18 Feb 2022 05:08) (14 - 19)  SpO2: 100% (18 Feb 2022 05:08) (95% - 100%)    General:  Appears stated age, well-groomed, well-nourished, no distress  Lungs:  CTAB  Cardiovascular:  good S1, S2,   Abdomen:  Soft, + RUQ tender, mildly distended, + BSx4  Extremities:  no calf tenderness/swelling b/l, LEFt AVF  Musculoskeletal:  Full ROM in all joints w/o swelling/tenderness/effusion  Neuro/Psych:  A &O x 3    LABS:                        11.8   15.67 )-----------( 144      ( 18 Feb 2022 06:28 )             37.8     02-18    138  |  104  |  60<H>  ----------------------------<  114<H>  4.0   |  26  |  7.66<H>    Ca    8.8      18 Feb 2022 06:28  Phos  5.6     02-18  Mg     2.3     02-18    TPro  7.7  /  Alb  3.1<L>  /  TBili  1.3<H>  /  DBili  x   /  AST  16  /  ALT  11<L>  /  AlkPhos  67  02-18    PT/INR - ( 18 Feb 2022 06:28 )   PT: 17.1 sec;   INR: 1.50 ratio         PTT - ( 18 Feb 2022 06:28 )  PTT:35.2 sec      RADIOLOGY & ADDITIONAL STUDIES:  < from: US Abdomen Complete (US Abdomen Complete .) (02.18.22 @ 00:51) >  Distended gallbladder with nonmobile gallstone in the gallbladder neck   with mild gallbladder wall thickening. Findings suggest a high positive   predictive value for acute cholecystitis.    < end of copied text >    < from: CT Abdomen and Pelvis w/ Oral Cont and w/ IV Cont (02.17.22 @ 22:58) >    IMPRESSION:    Prominently distended gallbladder with gallstones and pericholecystic   stranding. There is also mildly dense ascites in the right paracolic   gutter and pelvis. Correlate with acute cholecystitis.    The appendix is seen, within normal limits. However, the appendiceal tip   is partially obscured by adjacent ascites in the right lower quadrant.    Patchy opacity of the right lung base may represent atelectasis and/or  infiltrate.    Tiny right pleural effusion.    --- End of Report ---        KALINA THAYER MD; Attending Radiologist  This document has been electronically signed. Feb 17 2022 11:58PM    < end of copied text >

## 2022-02-18 NOTE — H&P ADULT - HISTORY OF PRESENT ILLNESS
81 year old male w/ PMHx of HTN, HLD, ESRD T/TH/SAT presents to the ED c/o weakness             81 year old male w/ PMHx of HTN, HLD, ESRD T/TH/SAT, CVA, Atrial Fibrillation on Eliquis, PPM presents to the ED c/o abdominal pain. Pt reports he woke up Thursday morning w/ RUQ abdominal pain associated w/ persistent cough, n/v (NB/NB), gagging, SOB and wretching. Pt describes pain as a persistent "knot" which didn't improve until arrival to the ED. Pt reports he missed HD due to his sxs. Pt denies fever chills, diarrhea or dysuria (still makes some urine).    In ED initial vitals stable, T max 100.2. Labs sig for WBC 16.11, BUN/Cr 52/7.37, Lipase 714, CT A/P showed Prominently distended gallbladder with gallstones and pericholecystic stranding. There is also mildly dense ascites in the right paracolic gutter and pelvis. The appendix is seen, within normal limits. However, the appendiceal tip is partially obscured by adjacent ascites in the right lower quadrant. Patchy opacity of the right lung base may represent atelectasis and/or infiltrate. Tiny right pleural effusion.  RUQ Abdominal US showed Distended gallbladder with nonmobile gallstone in the gallbladder neck   with mild gallbladder wall thickening. Findings suggest a high positive   predictive value for acute cholecystitis.    Pt given IV Zosyn, Surgery consulted

## 2022-02-18 NOTE — H&P ADULT - ASSESSMENT
81 year old male w/ PMHx of HTN, HLD, ESRD T/TH/SAT, CVA, Atrial Fibrillation on Eliquis, PPM presents to the ED c/o abdominal pain. Pt reports he woke up Thursday morning w/ RUQ abdominal pain associated w/ persistent cough, n/v (NB/NB), gagging, SOB and wretching, pt being admitted for Acute Cholecystitis, Gallstone Pancreatitis.    1) Acute Cholecystitis, Gallstone Pancreatitis  - NPO  - pt on HD, missed TH session, BP stable, monitor off IVF  - c/w Zosyn  - pain control  - Appreciate surgery consult; IR percutaneous cholecystostomy; please call in am  - Hold ASA and Eliquis pending IR procedure; - pls resume post procedure    2) ESRD on HD  -  HD per renal  - Dr Jerry made aware    3) HTN  - c/w home meds  - pt unsure of still take BB; pls verify in am    4) Atrial Fibrillation  - HR stable  - Eliquis on hold pending IR procedure    5) DVT ppx - ot on Eliquis at home; to be resumed post procedure        81 year old male w/ PMHx of HTN, HLD, ESRD T/TH/SAT, CVA, Atrial Fibrillation on Eliquis, PPM presents to the ED c/o abdominal pain. Pt reports he woke up Thursday morning w/ RUQ abdominal pain associated w/ persistent cough, n/v (NB/NB), gagging, SOB and wretching, pt being admitted for Acute Cholecystitis, Gallstone Pancreatitis.    1) Acute Cholecystitis, Gallstone Pancreatitis  - NPO  - pt on HD, missed TH session, BP stable, LA wnl monitor off IVF (IVF per Nephro discretion)  - c/w Zosyn  - pain control  - Appreciate surgery consult; IR percutaneous cholecystostomy; please call in am  - Hold ASA and Eliquis pending IR procedure; - pls resume post procedure    2) ESRD on HD  -  HD per renal  - Dr Jerry made aware    3) HTN  - c/w home meds  - pt unsure of still take BB; pls verify in am    4) Atrial Fibrillation  - HR stable  - Eliquis on hold pending IR procedure    5) DVT ppx - ot on Eliquis at home; to be resumed post procedure

## 2022-02-18 NOTE — CONSULT NOTE ADULT - ASSESSMENT
82 y/o male PMHx ESRD on HD, AV fistula, pacemaker, CVA, HLD, HTN, polycystic kidney disease, afib, b/l JUSTIN, diastolic heart failure c/o nausea and RUQ pain yesterday. Acute cholecystitis with gallstone in the gallbladder neck. 80 y/o male PMHx ESRD on HD, AV fistula, pacemaker, CVA, HLD, HTN, polycystic kidney disease, afib, b/l JUSTIN, diastolic heart failure c/o nausea and RUQ pain yesterday. Acute cholecystitis with gallstone in the gallbladder neck.    Plan:  Zosyn, NPO, IVF, IR evaluation for perc denae  Recommend renal consult for HD, cardiology consult  Follow up AM labs

## 2022-02-18 NOTE — H&P ADULT - NSHPLABSRESULTS_GEN_ALL_CORE
T(C): 37.8 (02-18-22 @ 05:08), Max: 37.9 (02-17-22 @ 18:08)  HR: 69 (02-18-22 @ 05:08) (69 - 72)  BP: 146/79 (02-18-22 @ 05:08) (145/72 - 149/80)  RR: 14 (02-18-22 @ 05:08) (14 - 19)  SpO2: 100% (02-18-22 @ 05:08) (95% - 100%)                        11.8   15.67 )-----------( 144      ( 18 Feb 2022 06:28 )             37.8     02-18    138  |  104  |  60<H>  ----------------------------<  114<H>  4.0   |  26  |  7.66<H>    Ca    8.8      18 Feb 2022 06:28  Phos  5.6     02-18  Mg     2.3     02-18    TPro  7.7  /  Alb  3.1<L>  /  TBili  1.3<H>  /  DBili  x   /  AST  16  /  ALT  11<L>  /  AlkPhos  67  02-18    LIVER FUNCTIONS - ( 18 Feb 2022 06:28 )  Alb: 3.1 g/dL / Pro: 7.7 gm/dL / ALK PHOS: 67 U/L / ALT: 11 U/L / AST: 16 U/L / GGT: x           PT/INR - ( 18 Feb 2022 06:28 )   PT: 17.1 sec;   INR: 1.50 ratio         PTT - ( 18 Feb 2022 06:28 )  PTT:35.2 sec    < from: CT Abdomen and Pelvis w/ Oral Cont and w/ IV Cont (02.17.22 @ 22:58) >    IMPRESSION:    Prominently distended gallbladder with gallstones and pericholecystic   stranding. There is also mildly dense ascites in the right paracolic   gutter and pelvis. Correlate with acute cholecystitis.    The appendix is seen, within normal limits. However, the appendiceal tip   is partially obscured by adjacent ascites in the right lower quadrant.    Patchy opacity of the right lung base may represent atelectasis and/or  infiltrate.    Tiny right pleural effusion.    --- End of Report ---    < end of copied text >    < from: US Abdomen Complete (US Abdomen Complete .) (02.18.22 @ 00:51) >    IMPRESSION:  Distended gallbladder with nonmobile gallstone in the gallbladder neck   with mild gallbladder wall thickening. Findings suggest a high positive   predictive value for acute cholecystitis.      < end of copied text >      acetaminophen     Tablet .. 650 milliGRAM(s) Oral every 6 hours PRN  amLODIPine   Tablet 10 milliGRAM(s) Oral daily  atorvastatin 10 milliGRAM(s) Oral at bedtime  dextrose 5% + sodium chloride 0.45%. 1000 milliLiter(s) IV Continuous <Continuous>  hydrALAZINE 25 milliGRAM(s) Oral daily  isosorbide   mononitrate ER Tablet (IMDUR) 30 milliGRAM(s) Oral daily  ondansetron Injectable 4 milliGRAM(s) IV Push every 8 hours PRN  piperacillin/tazobactam IVPB... 3.375 Gram(s) IV Intermittent every 12 hours  sodium bicarbonate 650 milliGRAM(s) Oral two times a day  traMADol 50 milliGRAM(s) Oral every 12 hours PRN

## 2022-02-18 NOTE — ED PROVIDER NOTE - NS ED MD TWO NIGHTS YN
Spoke with mother, she wanted to let office know the medication is helping the tics and behavior. Made apt for 10/7.   Yes

## 2022-02-18 NOTE — H&P ADULT - NSHPPHYSICALEXAM_GEN_ALL_CORE
PHYSICAL EXAM:    Vital Signs Last 24 Hrs  T(C): 37.8 (18 Feb 2022 05:08), Max: 37.9 (17 Feb 2022 18:08)  T(F): 100.1 (18 Feb 2022 05:08), Max: 100.2 (17 Feb 2022 18:08)  HR: 69 (18 Feb 2022 05:08) (69 - 72)  BP: 146/79 (18 Feb 2022 05:08) (145/72 - 149/80)  BP(mean): --  RR: 14 (18 Feb 2022 05:08) (14 - 19)  SpO2: 100% (18 Feb 2022 05:08) (95% - 100%)    GENERAL: Pt lying in bed comfortably in NAD  HEENT:  Atraumatic, EOMI, conjunctiva and sclera clear, MMM  NECK: Supple  CHEST/LUNG: Clear to auscultation bilaterally  HEART: Regular rate and rhythm  ABDOMEN: Bowel sounds present; Soft, RUQ tender, Nondistended. No guarding or rigidity    EXTREMITIES:  Trace pedal edema  NEUROLOGICAL:  Alert & Oriented X3, No deficits   MSK: FROM x 4 extremities   SKIN: warm, dry

## 2022-02-18 NOTE — ED ADULT NURSE REASSESSMENT NOTE - NS ED NURSE REASSESS COMMENT FT1
covering for primary rn yue. pending lactate, dr. wylie made aware and will f/u with Dr. Tiwari. as per Dr. tiwari draw lactate.

## 2022-02-18 NOTE — CONSULT NOTE ADULT - SUBJECTIVE AND OBJECTIVE BOX
GENERAL SURGERY CONSULT NOTE    Patient is a 81y old  Male who presents with a chief complaint of nausea/RUQ pain.    82 y/o male PMHx ESRD on HD, AV fistula, pacemaker, CVA, HLD, HTN, polycystic kidney disease, afib, b/l JUSTIN, diastolic heart failure c/o nausea and RUQ pain yesterday. Pain was a 7/10, intermittent, not associated with PO intake. Pt currently denies pain. Reports that he missed HD yesterday. Denies any other complaints. Patient denies fever, chills, vomiting, constipation, diarrhea, melena, hematochezia, dysuria, hematuria, chest pain, shortness of breath, dizziness, cough. Patient denies prior incident.     REVIEW OF SYSTEMS:  CONSTITUTIONAL: No fever, weight loss, or fatigue  EYES: No eye pain, visual disturbances, discharge  ENMT:  No difficulty hearing, tinnitus, vertigo; No sinus or throat pain  NECK: No pain or stiffness  BREASTS: No pain, masses, or nipple discharge  RESPIRATORY: No cough, wheezing, chills or hemoptysis; No shortness of breath  CARDIOVASCULAR: No chest pain, palpitations, dizziness, or leg swelling  GASTROINTESTINAL: +RUQ pain & nausea. No vomiting, or hematemesis; No diarrhea or constipation. No melena or hematochezia.  GENITOURINARY: No dysuria, frequency, hematuria, or incontinence  NEUROLOGICAL: No headaches, memory loss, loss of strength, numbness, or tremors  SKIN: No itching, burning, rashes, or lesions   LYMPH NODES: No enlarged glands  ENDOCRINE: No heat or cold intolerance; No hair loss  MUSCULOSKELETAL: No joint pain or swelling; No muscle, back, or extremity pain  PSYCHIATRIC: No depression, anxiety, mood swings, or difficulty sleeping  HEME/LYMPH: No easy bruising, or bleeding gums  ALLERY AND IMMUNOLOGIC: No hives or eczema     PAST MEDICAL & SURGICAL HISTORY:  Hypertension  Hyperlipidemia, unspecified hyperlipidemia type  Renal insufficiency  Cerebrovascular accident (CVA)  History of pacemaker  S/P hip replacement  BL 2010, 2011    MEDICATIONS  (STANDING):  piperacillin/tazobactam IVPB... 3.375 Gram(s) IV Intermittent every 12 hours    MEDICATIONS  (PRN):  acetaminophen     Tablet .. 650 milliGRAM(s) Oral every 6 hours PRN Temp greater or equal to 38C (100.4F), Mild Pain (1 - 3)  ondansetron Injectable 4 milliGRAM(s) IV Push every 8 hours PRN Nausea and/or Vomiting  traMADol 50 milliGRAM(s) Oral every 6 hours PRN Severe Pain (7 - 10)    Allergies  No Known Allergies    SOCIAL HISTORY: Denies smoking, ETOH, drugs.           FAMILY HISTORY: Family history of cancer, patient does not know what kind. No other known family history.    Vital Signs Last 24 Hrs  T(C): 37.8 (18 Feb 2022 01:41), Max: 37.9 (17 Feb 2022 18:08)  T(F): 100 (18 Feb 2022 01:41), Max: 100.2 (17 Feb 2022 18:08)  HR: 69 (18 Feb 2022 01:41) (69 - 72)  BP: 146/78 (18 Feb 2022 01:41) (145/72 - 149/80)  BP(mean): --  RR: 18 (18 Feb 2022 01:41) (18 - 19)  SpO2: 98% (18 Feb 2022 01:41) (95% - 98%)    PHYSICAL EXAM:  CONSTITUTIONAL: NAD, well-developed  HEAD:  Atraumatic, Normocephalic  EYES: Conjunctiva and sclera clear  ENMT: No tonsillar erythema, exudates, or enlargement; Moist mucous membranes, No lesions  NECK: Supple, No JVD, Normal thyroid  NERVOUS SYSTEM:  Alert & Oriented X3  RESPIRATORY: Clear to auscultation bilaterally; No rales, rhonchi, wheezing  CARDIOVASCULAR: Regular rate and rhythm. S1S2  GASTROINTESTINAL: Nondistended, +BS, soft, mild right sided tenderness, no guarding, no rigidity   MUSCULOSKELETAL:  2+ Peripheral Pulses, No clubbing, cyanosis, or edema     LABS:                        12.3   16.11 )-----------( 162      ( 17 Feb 2022 20:58 )             38.0     02-17    139  |  104  |  52<H>  ----------------------------<  121<H>  3.7   |  24  |  7.37<H>    Ca    8.9      17 Feb 2022 20:58    TPro  8.1  /  Alb  3.3  /  TBili  0.9  /  DBili  x   /  AST  21  /  ALT  9<L>  /  AlkPhos  76  02-17      < from: US Abdomen Complete (US Abdomen Complete .) (02.18.22 @ 00:51) >  FINDINGS:    Liver: Within normal limits.  Bile ducts: Normal caliber. Common bile duct measures 4 mm.  Gallbladder: Gallbladder is distended measuring 11.2 x 5.8 cm. Nonmobile   gallstone within the gallbladder neck. Gallbladder wall measures up to 4   mm in thickness. No significant pericholecystic fluid. Negative Montiel's   sign documented.  Pancreas: Not visualized. Spleen: 12.5 x 4.6 x 4.2 cm.. Within normal   limits.  Right kidney: 18.3 cm. Polycystic right kidney. Largest cyst in the lower   pole measures 5.8 x 4.6 x 5.9 cm. No hydronephrosis.  Left kidney: 19 cm.. Polycystic left kidney. Largest cyst measures 5.8 x   4.6 x 6.4 cm No hydronephrosis.  Ascites: None.  Aorta and IVC: Visualized portions are within normal limits.  MPV: Normal directional flow.    IMPRESSION:  Distended gallbladder with nonmobile gallstone in the gallbladder neck   with mild gallbladder wall thickening. Findings suggest a high positive   predictive value for acute cholecystitis.    < end of copied text >      < from: CT Abdomen and Pelvis w/ Oral Cont and w/ IV Cont (02.17.22 @ 22:58) >  FINDINGS:  LOWER CHEST: Cardiomegaly. Pacemaker wires are seen. Tiny right pleural   effusion. Patchy opacity of the right lung base may represent   atelectasis, small infiltrate cannot be excluded. Aortic valvular and   coronary artery calcifications.  LIVER: Within normal limits.  BILE DUCTS: Normal caliber.  GALLBLADDER: Probably distended gallbladder with gallstones and   pericholecystic fatty stranding seen.  SPLEEN: Within normal limits.  PANCREAS: Within normal limits.  ADRENALS: Within normal limits.  KIDNEYS/URETERS: Enlarged bilateral polycystic kidneys. Some kidneys have   thin wall calcification again seen. Question punctate nonobstructing   left renal stones. No gross hydronephrosis.    BLADDER: Partially limited due to to streak artifact from bilateral total   hip prostheses.  REPRODUCTIVE ORGANS: Limited.    BOWEL:Evaluation of bowel is limited without oral contrast, however   there is no bowel obstruction. Colonic diverticulosis without acute   diverticulitis. The appendix is seen demonstrates a normal caliber and   abuts the pocket of mildly dense ascites inthe right lower quadrant,   partially obscuring the appendiceal tip region. Small bowel loops are not   dilated. Stomach is underdistended for adequate evaluation  PERITONEUM: No free air. Small amount of ascites in the right paracolic   gutter and pelvis..  VESSELS:  Calcific atherosclerosis of the abdominal aorta without   aneurysmal dilatation. Ectasia of the distal abdominal aorta measures 3.4   cm.  RETROPERITONEUM: No lymphadenopathy.  ABDOMINAL WALL: Small fat-containing umbilical hernia.    Degenerative changes of the spine. Bilateral hip prostheses.    IMPRESSION:    Prominently distended gallbladder with gallstones and pericholecystic   stranding. There is also mildly dense ascites in the right paracolic   gutter and pelvis. Correlate with acute cholecystitis.    The appendix is seen, within normal limits. However, the appendiceal tip   is partially obscured by adjacent ascites in the right lower quadrant.    Patchy opacity of the right lung base may represent atelectasis and/or  infiltrate.    Tiny right pleural effusion.    --- End of Report ---    < end of copied text >

## 2022-02-18 NOTE — CONSULT NOTE ADULT - SUBJECTIVE AND OBJECTIVE BOX
Patient chart reviewed, full consult to follow.     Known to me from outpatient center;     Will arrange for HD    Thank you for the courtesy of this consultation.   [facility] NEPHROLOGY SERVICES, [facility]  NEPHROLOGY AND HYPERTENSION  [address]  [address]  [place] [address]  [phone]    MD BLANCA SCHMIDT MD ANDREY GONCHARUK, MD MADHU KORRAPATI, MD YELENA ROSENBERG, MD BINNY KOSHY, MD CHRISTOPHER CAPUTO, MD EDWARD BOVER, MD      Information from chart:  "Patient is a 81y old  Male who presents with a chief complaint of Acute Cholecystitis, Gallstone Pancreatitis (2022 17:12)    HPI:             81 year old male w/ PMHx of HTN, HLD, ESRD T//SAT, CVA, Atrial Fibrillation on Eliquis, PPM presents to the ED c/o abdominal pain. Pt reports he woke up Thursday morning w/ RUQ abdominal pain associated w/ persistent cough, n/v (NB/NB), gagging, SOB and wretching. Pt describes pain as a persistent "knot" which didn't improve until arrival to the ED. Pt reports he missed HD due to his sxs. Pt denies fever chills, diarrhea or dysuria (still makes some urine).    In ED initial vitals stable, T max 100.2. Labs sig for WBC 16.11, BUN/Cr 52/7.37, Lipase 714, CT A/P showed Prominently distended gallbladder with gallstones and pericholecystic stranding. There is also mildly dense ascites in the right paracolic gutter and pelvis. The appendix is seen, within normal limits. However, the appendiceal tip is partially obscured by adjacent ascites in the right lower quadrant. Patchy opacity of the right lung base may represent atelectasis and/or infiltrate. Tiny right pleural effusion.  RUQ Abdominal US showed Distended gallbladder with nonmobile gallstone in the gallbladder neck   with mild gallbladder wall thickening. Findings suggest a high positive   predictive value for acute cholecystitis.      Patient dialysis TTHSat; missed treatment Thurs,           Pt given IV Zosyn, Surgery consulted (2022 02:50)   "    PAST MEDICAL & SURGICAL HISTORY:  Hypertension    Hyperlipidemia, unspecified hyperlipidemia type    Renal insufficiency    Cerebrovascular accident (CVA)    History of pacemaker    S/P hip replacement  2010,       FAMILY HISTORY:  No pertinent family history in first degree relatives      Allergies    No Known Allergies    Intolerances      Home Medications:  amLODIPine 10 mg oral tablet: 1 tab(s) orally once a day (2022 08:27)  aspirin 325 mg oral tablet: 1 tab(s) orally once a day (2022 08:27)  ATORVASTATIN 10MG TABLETS: TAKE 1 TABLET BY MOUTH EVERY DAY (2022 08:27)  ELIQUIS 2.5MG TABLETS: TAKE 1 TABLET BY MOUTH TWICE DAILY (2022 08:27)  HYDRALAZINE  25MG TABLETS(ORANGE): TAKE 1 TABLET BY MOUTH DAILY WITH FOOD (2022 08:27)  isosorbide mononitrate 30 mg oral tablet, extended release: 1 tab(s) orally once a day (2022 08:)  metoprolol tartrate 25 mg oral tablet: 1 tab(s) orally 2 times a day (2022 08:)  SODIUM BICARBONATE 10GR(650MG) TABS: TAKE 1 TABLET BY MOUTH TWICE DAILY (2022 08:)    MEDICATIONS  (STANDING):  amLODIPine   Tablet 10 milliGRAM(s) Oral daily  atorvastatin 10 milliGRAM(s) Oral at bedtime  dextrose 5% + sodium chloride 0.45%. 1000 milliLiter(s) (30 mL/Hr) IV Continuous <Continuous>  hydrALAZINE 25 milliGRAM(s) Oral daily  isosorbide   mononitrate ER Tablet (IMDUR) 30 milliGRAM(s) Oral daily  piperacillin/tazobactam IVPB... 3.375 Gram(s) IV Intermittent every 12 hours  sodium bicarbonate 650 milliGRAM(s) Oral two times a day    MEDICATIONS  (PRN):  acetaminophen     Tablet .. 650 milliGRAM(s) Oral every 6 hours PRN Temp greater or equal to 38C (100.4F), Mild Pain (1 - 3)  ondansetron Injectable 4 milliGRAM(s) IV Push every 8 hours PRN Nausea and/or Vomiting  traMADol 50 milliGRAM(s) Oral every 12 hours PRN Moderate Pain (4 - 6)    Vital Signs Last 24 Hrs  T(C): 37.6 (2022 18:01), Max: 37.8 (2022 01:41)  T(F): 99.7 (2022 18:01), Max: 100.1 (2022 05:08)  HR: 71 (2022 18:01) (66 - 74)  BP: 146/76 (2022 18:01) (120/67 - 156/64)  BP(mean): --  RR: 18 (2022 18:01) (14 - 19)  SpO2: 95% (2022 18:01) (95% - 100%)    Daily     Daily Weight in k.1 (2022 05:08)    22 @ 07:01  -  22 @ 22:11  --------------------------------------------------------  IN: 0 mL / OUT: 400 mL / NET: -400 mL      CAPILLARY BLOOD GLUCOSE        PHYSICAL EXAM:      T(C): 37.6 (22 @ 18:01), Max: 37.8 (22 @ 01:41)  HR: 71 (22 @ 18:01) (66 - 74)  BP: 146/76 (22 @ 18:01) (120/67 - 156/64)  RR: 18 (22 @ 18:01) (14 - 19)  SpO2: 95% (22 @ 18:01) (95% - 100%)  Wt(kg): --  Lungs clear  Heart S1S2  Abd soft NT ND cholecystostomy tube in place   Extremities:   tr edema                  138  |  104  |  60<H>  ----------------------------<  114<H>  4.0   |  26  |  7.66<H>    Ca    8.8      2022 06:28  Phos  5.6       Mg     2.3         TPro  7.7  /  Alb  3.1<L>  /  TBili  1.3<H>  /  DBili  x   /  AST  16  /  ALT  11<L>  /  AlkPhos  67                            11.8   15.67 )-----------( 144      ( 2022 06:28 )             37.8     Creatinine Trend: 7.66<--, 7.37<--          Assessment   Acute cholecystitis   ESRD     Plan  HD for tomorrow  Will follow     Abbe Jerry MD

## 2022-02-18 NOTE — PROCEDURE NOTE - PROCEDURE FINDINGS AND DETAILS
Successful Percutaneous Cholecystostomy placement under unltrasound and fluoroscopic guidance.    Plan:  -Cholecystostomy to remain in place for 6-8 weeks to allow the tract to mature  -Patient should follow up with a surgeon for possible interval cholecystectomy  -If the patient does not have a cholecystectomy in 6 weeks, he should follow up with Interventional Radiology for a tube study and evaluation for possible spyglass stone lithotripsy and removal.

## 2022-02-19 NOTE — PROGRESS NOTE ADULT - SUBJECTIVE AND OBJECTIVE BOX
NEPHROLOGY PROGRESS NOTE    CHIEF COMPLAINT:  ESRD    HPI:  Seen on dialysis.  Access working well.  BP stable.  Had a perc denae yesterday.    ROS:  no SOB    EXAM:  T(F): 97.8 (02-19-22 @ 05:30)  HR: 69 (02-19-22 @ 08:34)  BP: 126/74 (02-19-22 @ 08:34)  RR: 18 (02-19-22 @ 05:30)  SpO2: 94% (02-19-22 @ 05:30)    Conversant, in no apparent distress  Normal respiratory effort, lungs clear bilaterally  Heart RRR with no murmur, no peripheral edema         LABS                             11.0   12.30 )-----------( 115      ( 19 Feb 2022 08:17 )             34.5          02-19    138  |  103  |  73<H>  ----------------------------<  101<H>  3.7   |  22  |  8.59<H>    Ca    8.5      19 Feb 2022 08:17  Phos  5.6     02-18  Mg     2.3     02-18    TPro  7.7  /  Alb  3.1<L>  /  TBili  1.3<H>  /  DBili  x   /  AST  16  /  ALT  11<L>  /  AlkPhos  67  02-18           Assessment   Acute cholecystitis s/p perc drain  ESRD on HD TTS    Plan  Complete HD as ordered today

## 2022-02-19 NOTE — PROGRESS NOTE ADULT - SUBJECTIVE AND OBJECTIVE BOX
Patient seen and examined at bedside resting.   Offers no complaints, admits to some soreness at perc denae site.  Denies fever, chills, N/V/D, CP, SOB    Vital Signs Last 24 Hrs  T(F): 99.1 (02-19-22 @ 09:50), Max: 99.8 (02-18-22 @ 23:47)  HR: 69 (02-19-22 @ 09:50)  BP: 101/58 (02-19-22 @ 09:50)  RR: 18 (02-19-22 @ 09:50)  SpO2: 98% (02-19-22 @ 09:50)    PHYSICAL EXAM:  GENERAL: Alert, NAD  CHEST/LUNG: Clear to auscultation bilaterally, respirations nonlabored  HEART: Regular rate and rhythm; S1 & S2 appreciated  ABDOMEN: soft, ttp along perc denae site, non distended. minimal bile/slightly blood tinged output from perc denae site  EXTREMITIES:  no calf tenderness, no edema    I&O's Detail    18 Feb 2022 07:01  -  19 Feb 2022 07:00  --------------------------------------------------------  IN:    dextrose 5% + sodium chloride 0.45%: 360 mL    IV PiggyBack: 100 mL  Total IN: 460 mL    OUT:    Drain (mL): 190 mL    Oral Fluid: 0 mL    Voided (mL): 825 mL  Total OUT: 1015 mL    Total NET: -555 mL      19 Feb 2022 07:01  -  19 Feb 2022 14:13  --------------------------------------------------------  IN:  Total IN: 0 mL    OUT:    Drain (mL): 60 mL  Total OUT: 60 mL    Total NET: -60 mL    LABS:                        11.0   12.30 )-----------( 115      ( 19 Feb 2022 08:17 )             34.5     02-19    138  |  103  |  73<H>  ----------------------------<  101<H>  3.7   |  22  |  8.59<H>    Ca    8.5      19 Feb 2022 08:17  Phos  5.6     02-18  Mg     2.3     02-18    TPro  7.0  /  Alb  2.5<L>  /  TBili  1.5<H>  /  DBili  0.7<H>  /  AST  20  /  ALT  9<L>  /  AlkPhos  57  02-19    PT/INR - ( 18 Feb 2022 06:28 )   PT: 17.1 sec;   INR: 1.50 ratio      PTT - ( 18 Feb 2022 06:28 )  PTT:35.2 sec    A/P  80 y/o male PMHx ESRD on HD, AV fistula, pacemaker, CVA, HLD, HTN, polycystic kidney disease, afib, b/l JUSTIN, diastolic heart failure c/o nausea and RUQ pain yesterday. Acute cholecystitis with gallstone in the gallbladder neck.  Perc denae by IR on 2/18    - adv diet as tolerated  - abx per primary team  -drain care, monitor output  - continue medical and renal management   - OP f/u for eventual cholecystectomy  - will sign off, please reconsult prn  - d/w surgical attending

## 2022-02-19 NOTE — CONSULT NOTE ADULT - SUBJECTIVE AND OBJECTIVE BOX
BRITNEY ROSEN  MRN-[id number]        Patient is a 81y old  Male who presents with a chief complaint of Acute Cholecystitis, Gallstone Pancreatitis (19 Feb 2022 14:13)      HPI:             81 year old male w/ PMHx of HTN, HLD, ESRD T/TH/SAT, CVA, Atrial Fibrillation on Eliquis, PPM presents to the ED c/o abdominal pain. Pt reports he woke up Thursday morning w/ RUQ abdominal pain associated w/ persistent cough, n/v (NB/NB), gagging, SOB and wretching. Pt describes pain as a persistent "knot" which didn't improve until arrival to the ED. Pt reports he missed HD due to his sxs. Pt denies fever chills, diarrhea or dysuria (still makes some urine).    In ED initial vitals stable, T max 100.2. Labs sig for WBC 16.11, BUN/Cr 52/7.37, Lipase 714, CT A/P showed Prominently distended gallbladder with gallstones and pericholecystic stranding. There is also mildly dense ascites in the right paracolic gutter and pelvis. The appendix is seen, within normal limits. However, the appendiceal tip is partially obscured by adjacent ascites in the right lower quadrant. Patchy opacity of the right lung base may represent atelectasis and/or infiltrate. Tiny right pleural effusion.  RUQ Abdominal US showed Distended gallbladder with nonmobile gallstone in the gallbladder neck   with mild gallbladder wall thickening. Findings suggest a high positive   predictive value for acute cholecystitis.    Pt given IV Zosyn, Surgery consulted (18 Feb 2022 02:50)      ID consulted for workup and antibiotic management     PAST MEDICAL & SURGICAL HISTORY:  Hypertension    Hyperlipidemia, unspecified hyperlipidemia type    Renal insufficiency    Cerebrovascular accident (CVA)    History of pacemaker    S/P hip replacement  BL 2010, 2011        Allergies  No Known Allergies        ANTIMICROBIALS:  piperacillin/tazobactam IVPB... 3.375 every 12 hours      MEDICATIONS  (STANDING):  piperacillin/tazobactam IVPB...   200 mL/Hr IV Intermittent (02-18-22 @ 00:37)    piperacillin/tazobactam IVPB...   200 mL/Hr IV Intermittent (02-19-22 @ 18:13)   200 mL/Hr IV Intermittent (02-19-22 @ 05:45)   200 mL/Hr IV Intermittent (02-18-22 @ 17:56)   200 mL/Hr IV Intermittent (02-18-22 @ 05:10)        OTHER MEDS: MEDICATIONS  (STANDING):  acetaminophen     Tablet .. 650 every 6 hours PRN  amLODIPine   Tablet 10 daily  apixaban 2.5 two times a day  aspirin  chewable 81 daily  atorvastatin 10 at bedtime  isosorbide   mononitrate ER Tablet (IMDUR) 30 daily  metoprolol tartrate 25 two times a day  ondansetron Injectable 4 every 8 hours PRN  traMADol 50 every 12 hours PRN      SOCIAL HISTORY:       FAMILY HISTORY:  No pertinent family history in first degree relatives        REVIEW OF SYSTEMS  [  ] ROS unobtainable because:    [  ] All other systems negative except as noted below:	    Constitutional:  [ ] fever [ ] chills  [ ] weight loss  [ ] weakness  Skin:  [ ] rash [ ] phlebitis	  Eyes: [ ] icterus [ ] pain  [ ] discharge	  ENMT: [ ] sore throat  [ ] thrush [ ] ulcers [ ] exudates  Respiratory: [ ] dyspnea [ ] hemoptysis [ ] cough [ ] sputum	  Cardiovascular:  [ ] chest pain [ ] palpitations [ ] edema	  Gastrointestinal:  [ ] nausea [ ] vomiting [ ] diarrhea [ ] constipation [ ] pain	  Genitourinary:  [ ] dysuria [ ] frequency [ ] hematuria [ ] discharge [ ] flank pain  [ ] incontinence  Musculoskeletal:  [ ] myalgias [ ] arthralgias [ ] arthritis  [ ] back pain  Neurological:  [ ] headache [ ] seizures  [ ] confusion/altered mental status  Psychiatric:  [ ] anxiety [ ] depression	  Hematology/Lymphatics:  [ ] lymphadenopathy  Endocrine:  [ ] adrenal [ ] thyroid  Allergic/Immunologic:	 [ ] transplant [ ] seasonal    Vital Signs Last 24 Hrs  T(F): 98.8 (02-19-22 @ 16:38), Max: 100.2 (02-17-22 @ 18:08)    Vital Signs Last 24 Hrs  HR: 69 (02-19-22 @ 16:38) (68 - 72)  BP: 104/62 (02-19-22 @ 16:38) (100/63 - 126/74)  RR: 17 (02-19-22 @ 16:38)  SpO2: 96% (02-19-22 @ 16:38) (94% - 98%)  Wt(kg): --    PHYSICAL EXAM:  Constitutional: non-toxic, no distress  HEAD/EYES: anicteric, no conjunctival injection  ENT:  supple, no thrush  Cardiovascular:   normal S1, S2, no murmur, no edema  Respiratory:  clear BS bilaterally, no wheezes, no rales  GI:  soft, non-tender, normal bowel sounds  :  no chino, no CVA tenderness  Musculoskeletal:  no synovitis, normal ROM  Neurologic: awake and alert, normal strength, no focal findings  Skin:  no rash, no erythema, no phlebitis  Heme/Onc: no lymphadenopathy   Psychiatric:  awake, alert, appropriate mood          WBC Count: 12.30 K/uL (02-19 @ 08:17)  WBC Count: 15.67 K/uL (02-18 @ 06:28)  WBC Count: 16.11 K/uL (02-17 @ 20:58)      Auto Neutrophil %: 90.9 % *H* (02-19-22 @ 08:17)  Auto Neutrophil #: 11.18 K/uL *H* (02-19-22 @ 08:17)  Auto Neutrophil %: 89.6 % *H* (02-18-22 @ 06:28)  Auto Neutrophil #: 14.03 K/uL *H* (02-18-22 @ 06:28)  Auto Neutrophil %: 92.8 % *H* (02-17-22 @ 20:58)  Auto Neutrophil #: 14.95 K/uL *H* (02-17-22 @ 20:58)                            11.0   12.30 )-----------( 115      ( 19 Feb 2022 08:17 )             34.5       02-19    138  |  103  |  73<H>  ----------------------------<  101<H>  3.7   |  22  |  8.59<H>    Ca    8.5      19 Feb 2022 08:17  Phos  5.6     02-18  Mg     2.3     02-18    TPro  7.0  /  Alb  2.5<L>  /  TBili  1.5<H>  /  DBili  0.7<H>  /  AST  20  /  ALT  9<L>  /  AlkPhos  57  02-19      Creatinine Trend: 8.59<--, 7.66<--, 7.37<--        MICROBIOLOGY:    Culture - Body Fluid with Gram Stain (collected 02-18-22 @ 18:40)  Source: .Body Fluid Bile  Gram Stain (prelim) (02-19-22 @ 18:39):    No polymorphonuclear cells seen per low power field    Few Gram positive cocci in pairs per oil power field  Preliminary Report (02-19-22 @ 18:39):    Moderate Alpha hemolytic strep "Susceptibilities not performed"    SARS-CoV-2 Result: NotDetec (02-17-22 @ 20:58)      RADIOLOGY:  < from: US Abdomen Complete (US Abdomen Complete .) (02.18.22 @ 00:51) >  IMPRESSION:  Distended gallbladder with nonmobile gallstone in the gallbladder neck   with mild gallbladder wall thickening. Findings suggest a high positive   predictive value for acute cholecystitis.    < from: CT Abdomen and Pelvis w/ Oral Cont and w/ IV Cont (02.17.22 @ 22:58) >  IMPRESSION:    Prominently distended gallbladder with gallstones and pericholecystic   stranding. There is also mildly dense ascites in the right paracolic   gutter and pelvis. Correlate with acute cholecystitis.    The appendix is seen, within normal limits. However, the appendiceal tip   is partially obscured by adjacent ascites in the right lower quadrant.    Patchy opacity of the right lung base may represent atelectasis and/or  infiltrate.    Tiny right pleural effusion.                     BRITNEY ROSEN  MRN-[id number]        Patient is a 81y old  Male who presents with a chief complaint of Acute Cholecystitis, Gallstone Pancreatitis (19 Feb 2022 14:13)      HPI:             81 year old male w/ PMHx of HTN, HLD, ESRD T/TH/SAT, CVA, Atrial Fibrillation on Eliquis, PPM presents to the ED c/o abdominal pain. Pt reports he woke up Thursday morning w/ RUQ abdominal pain associated w/ persistent cough, n/v (NB/NB), gagging, SOB and wretching. Pt describes pain as a persistent "knot" which didn't improve until arrival to the ED. Pt reports he missed HD due to his sxs. Pt denies fever chills, diarrhea or dysuria (still makes some urine).    In ED initial vitals stable, T max 100.2. Labs sig for WBC 16.11, BUN/Cr 52/7.37, Lipase 714, CT A/P showed Prominently distended gallbladder with gallstones and pericholecystic stranding. There is also mildly dense ascites in the right paracolic gutter and pelvis. The appendix is seen, within normal limits. However, the appendiceal tip is partially obscured by adjacent ascites in the right lower quadrant. Patchy opacity of the right lung base may represent atelectasis and/or infiltrate. Tiny right pleural effusion.  RUQ Abdominal US showed Distended gallbladder with nonmobile gallstone in the gallbladder neck   with mild gallbladder wall thickening. Findings suggest a high positive   predictive value for acute cholecystitis.    Pt given IV Zosyn, Surgery consulted (18 Feb 2022 02:50)      ID consulted for workup and antibiotic management     PAST MEDICAL & SURGICAL HISTORY:  Hypertension    Hyperlipidemia, unspecified hyperlipidemia type    Renal insufficiency    Cerebrovascular accident (CVA)    History of pacemaker    S/P hip replacement  BL 2010, 2011        Allergies  No Known Allergies        ANTIMICROBIALS:  piperacillin/tazobactam IVPB... 3.375 every 12 hours      MEDICATIONS  (STANDING):  piperacillin/tazobactam IVPB...   200 mL/Hr IV Intermittent (02-18-22 @ 00:37)    piperacillin/tazobactam IVPB...   200 mL/Hr IV Intermittent (02-19-22 @ 18:13)   200 mL/Hr IV Intermittent (02-19-22 @ 05:45)   200 mL/Hr IV Intermittent (02-18-22 @ 17:56)   200 mL/Hr IV Intermittent (02-18-22 @ 05:10)        OTHER MEDS: MEDICATIONS  (STANDING):  acetaminophen     Tablet .. 650 every 6 hours PRN  amLODIPine   Tablet 10 daily  apixaban 2.5 two times a day  aspirin  chewable 81 daily  atorvastatin 10 at bedtime  isosorbide   mononitrate ER Tablet (IMDUR) 30 daily  metoprolol tartrate 25 two times a day  ondansetron Injectable 4 every 8 hours PRN  traMADol 50 every 12 hours PRN      SOCIAL HISTORY:     Nonsmoker  no etoh or drugs    FAMILY HISTORY:  No pertinent family history in first degree relatives        REVIEW OF SYSTEMS  [  ] ROS unobtainable because:    [X  ] All other systems negative except as noted below:	    Constitutional:  [ ] fever [ ] chills  [ ] weight loss  [ ] weakness  Skin:  [ ] rash [ ] phlebitis	  Eyes: [ ] icterus [ ] pain  [ ] discharge	  ENMT: [ ] sore throat  [ ] thrush [ ] ulcers [ ] exudates  Respiratory: [ ] dyspnea [ ] hemoptysis [ ] cough [ ] sputum	  Cardiovascular:  [ ] chest pain [ ] palpitations [ ] edema	  Gastrointestinal:  [ ] nausea [ ] vomiting [ ] diarrhea [ ] constipation [x ] pain	  Genitourinary:  [ ] dysuria [ ] frequency [ ] hematuria [ ] discharge [ ] flank pain  [ ] incontinence  Musculoskeletal:  [ ] myalgias [ ] arthralgias [ ] arthritis  [ ] back pain  Neurological:  [ ] headache [ ] seizures  [ ] confusion/altered mental status  Psychiatric:  [ ] anxiety [ ] depression	  Hematology/Lymphatics:  [ ] lymphadenopathy  Endocrine:  [ ] adrenal [ ] thyroid  Allergic/Immunologic:	 [ ] transplant [ ] seasonal    Vital Signs Last 24 Hrs  T(F): 98.8 (02-19-22 @ 16:38), Max: 100.2 (02-17-22 @ 18:08)    Vital Signs Last 24 Hrs  HR: 69 (02-19-22 @ 16:38) (68 - 72)  BP: 104/62 (02-19-22 @ 16:38) (100/63 - 126/74)  RR: 17 (02-19-22 @ 16:38)  SpO2: 96% (02-19-22 @ 16:38) (94% - 98%)  Wt(kg): --    PHYSICAL EXAM:  Constitutional: non-toxic, no distress  HEAD/EYES: anicteric, no conjunctival injection  ENT:  supple, no thrush  Cardiovascular:   normal S1, S2, 3/6 +murmur, no edema  Respiratory:  clear BS bilaterally, no wheezes, no rales  GI:  soft, there is a perc cholecystotomy tube present with blood in the bag, +tender around insertion site, normal bowel sounds  :  no chino, no CVA tenderness  Musculoskeletal:  no synovitis, normal ROM, LUE AVF with palpable thrill  Neurologic: awake and alert, normal strength, no focal findings  Skin:  no rash, no erythema, no phlebitis  Heme/Onc: no lymphadenopathy   Psychiatric:  awake, alert, appropriate mood          WBC Count: 12.30 K/uL (02-19 @ 08:17)  WBC Count: 15.67 K/uL (02-18 @ 06:28)  WBC Count: 16.11 K/uL (02-17 @ 20:58)      Auto Neutrophil %: 90.9 % *H* (02-19-22 @ 08:17)  Auto Neutrophil #: 11.18 K/uL *H* (02-19-22 @ 08:17)  Auto Neutrophil %: 89.6 % *H* (02-18-22 @ 06:28)  Auto Neutrophil #: 14.03 K/uL *H* (02-18-22 @ 06:28)  Auto Neutrophil %: 92.8 % *H* (02-17-22 @ 20:58)  Auto Neutrophil #: 14.95 K/uL *H* (02-17-22 @ 20:58)                            11.0   12.30 )-----------( 115      ( 19 Feb 2022 08:17 )             34.5       02-19    138  |  103  |  73<H>  ----------------------------<  101<H>  3.7   |  22  |  8.59<H>    Ca    8.5      19 Feb 2022 08:17  Phos  5.6     02-18  Mg     2.3     02-18    TPro  7.0  /  Alb  2.5<L>  /  TBili  1.5<H>  /  DBili  0.7<H>  /  AST  20  /  ALT  9<L>  /  AlkPhos  57  02-19      Creatinine Trend: 8.59<--, 7.66<--, 7.37<--        MICROBIOLOGY:    Culture - Body Fluid with Gram Stain (collected 02-18-22 @ 18:40)  Source: .Body Fluid Bile  Gram Stain (prelim) (02-19-22 @ 18:39):    No polymorphonuclear cells seen per low power field    Few Gram positive cocci in pairs per oil power field  Preliminary Report (02-19-22 @ 18:39):    Moderate Alpha hemolytic strep "Susceptibilities not performed"    SARS-CoV-2 Result: NotDetec (02-17-22 @ 20:58)      RADIOLOGY:  < from: US Abdomen Complete (US Abdomen Complete .) (02.18.22 @ 00:51) >  IMPRESSION:  Distended gallbladder with nonmobile gallstone in the gallbladder neck   with mild gallbladder wall thickening. Findings suggest a high positive   predictive value for acute cholecystitis.    < from: CT Abdomen and Pelvis w/ Oral Cont and w/ IV Cont (02.17.22 @ 22:58) >  IMPRESSION:    Prominently distended gallbladder with gallstones and pericholecystic   stranding. There is also mildly dense ascites in the right paracolic   gutter and pelvis. Correlate with acute cholecystitis.    The appendix is seen, within normal limits. However, the appendiceal tip   is partially obscured by adjacent ascites in the right lower quadrant.    Patchy opacity of the right lung base may represent atelectasis and/or  infiltrate.    Tiny right pleural effusion.

## 2022-02-19 NOTE — PROGRESS NOTE ADULT - SUBJECTIVE AND OBJECTIVE BOX
CHIEF COMPLAINT/INTERVAL HISTORY:    Patient is a 81y old  Male who presents with a chief complaint of Acute Cholecystitis, Gallstone Pancreatitis (19 Feb 2022 10:24)      HPI:             81 year old male w/ PMHx of HTN, HLD, ESRD T/TH/SAT, CVA, Atrial Fibrillation on Eliquis, PPM presents to the ED c/o abdominal pain. Pt reports he woke up Thursday morning w/ RUQ abdominal pain associated w/ persistent cough, n/v (NB/NB), gagging, SOB and wretching. Pt describes pain as a persistent "knot" which didn't improve until arrival to the ED. Pt reports he missed HD due to his sxs. Pt denies fever chills, diarrhea or dysuria (still makes some urine).    In ED initial vitals stable, T max 100.2. Labs sig for WBC 16.11, BUN/Cr 52/7.37, Lipase 714, CT A/P showed Prominently distended gallbladder with gallstones and pericholecystic stranding. There is also mildly dense ascites in the right paracolic gutter and pelvis. The appendix is seen, within normal limits. However, the appendiceal tip is partially obscured by adjacent ascites in the right lower quadrant. Patchy opacity of the right lung base may represent atelectasis and/or infiltrate. Tiny right pleural effusion.  RUQ Abdominal US showed Distended gallbladder with nonmobile gallstone in the gallbladder neck   with mild gallbladder wall thickening. Findings suggest a high positive   predictive value for acute cholecystitis.    Pt given IV Zosyn, Surgery consulted (18 Feb 2022 02:50)      SUBJECTIVE & OBJECTIVE: Pt seen and examined at bedside.   c/o mild soreness in RUQ  denies nausea/vomiting  denies CP/ sob or any other complaints  low grade temps  + blood stained drainage from denae tube      Vital Signs Last 24 Hrs  T(C): 37.3 (19 Feb 2022 09:50), Max: 37.7 (18 Feb 2022 13:57)  T(F): 99.1 (19 Feb 2022 09:50), Max: 99.8 (18 Feb 2022 13:57)  HR: 69 (19 Feb 2022 09:50) (68 - 74)  BP: 101/58 (19 Feb 2022 09:50) (101/58 - 156/64)  BP(mean): --  ABP: --  ABP(mean): --  RR: 18 (19 Feb 2022 09:50) (16 - 18)  SpO2: 98% (19 Feb 2022 09:50) (94% - 100%)        MEDICATIONS  (STANDING):  amLODIPine   Tablet 10 milliGRAM(s) Oral daily  atorvastatin 10 milliGRAM(s) Oral at bedtime  dextrose 5% + sodium chloride 0.45%. 1000 milliLiter(s) (30 mL/Hr) IV Continuous <Continuous>  hydrALAZINE 25 milliGRAM(s) Oral daily  isosorbide   mononitrate ER Tablet (IMDUR) 30 milliGRAM(s) Oral daily  piperacillin/tazobactam IVPB... 3.375 Gram(s) IV Intermittent every 12 hours  sodium bicarbonate 650 milliGRAM(s) Oral two times a day    MEDICATIONS  (PRN):  acetaminophen     Tablet .. 650 milliGRAM(s) Oral every 6 hours PRN Temp greater or equal to 38C (100.4F), Mild Pain (1 - 3)  ondansetron Injectable 4 milliGRAM(s) IV Push every 8 hours PRN Nausea and/or Vomiting  traMADol 50 milliGRAM(s) Oral every 12 hours PRN Moderate Pain (4 - 6)        PHYSICAL EXAM:    GENERAL: NAD, well-developed, wears glasses  HEAD:  Atraumatic, Normocephalic  EYES: EOMI, PERRLA, conjunctiva and sclera clear  NECK: Supple  NERVOUS SYSTEM:  Alert & Oriented X3,normal speech  CHEST/LUNG: Clear to auscultation bilaterally; No rales, rhonchi, wheezing, or rubs  HEART: S1, S2  ABDOMEN: Soft, mild tenderness at cholecystostomy tube site, Bowel sounds present  EXTREMITIES: no cyanosis, or edema    LABS:                        11.0   12.30 )-----------( 115      ( 19 Feb 2022 08:17 )             34.5     02-19    138  |  103  |  73<H>  ----------------------------<  101<H>  3.7   |  22  |  8.59<H>    Ca    8.5      19 Feb 2022 08:17  Phos  5.6     02-18  Mg     2.3     02-18    TPro  7.7  /  Alb  3.1<L>  /  TBili  1.3<H>  /  DBili  x   /  AST  16  /  ALT  11<L>  /  AlkPhos  67  02-18    PT/INR - ( 18 Feb 2022 06:28 )   PT: 17.1 sec;   INR: 1.50 ratio         PTT - ( 18 Feb 2022 06:28 )  PTT:35.2 sec      < from: US Abdomen Complete (US Abdomen Complete .) (02.18.22 @ 00:51) >  IMPRESSION:  Distended gallbladder with nonmobile gallstone in the gallbladder neck   with mild gallbladder wall thickening. Findings suggest a high positive   predictive value for acute cholecystitis.        --- End of Report ---            KALINA THAYER MD; Attending Radiologist  This document has been electronically signed. Feb 18 2022  1:43AM    < end of copied text >

## 2022-02-19 NOTE — CONSULT NOTE ADULT - ASSESSMENT
81 year old male w/ PMHx of HTN, HLD, ESRD T/TH/SAT, CVA, Atrial Fibrillation on Eliquis, PPM presents to the ED c/o abdominal pain found to have gallstone pancreatitis and acute cholecystitis .  T max 100.2.   Labs sig for WBC 16.11, BUN/Cr 52/7.37, Lipase 714  Radiology: CT A/P showed Prominently distended gallbladder with gallstones and pericholecystic stranding  RUQ Abdominal US showed Distended gallbladder with nonmobile gallstone in the gallbladder neck   with mild gallbladder wall thickening. Findings suggest a high positive   s/p cholecystostomy tube placement by IR  cultures from aspirate growing alpha strep    Acute cholecystitis   Leukocytosis   ESRD      81 year old male w/ PMHx of HTN, HLD, ESRD T/TH/SAT, CVA, Atrial Fibrillation on Eliquis, PPM presents to the ED c/o abdominal pain found to have gallstone pancreatitis and acute cholecystitis .  T max 100.2.   Labs sig for WBC 16.11, BUN/Cr 52/7.37, Lipase 714  Radiology: CT A/P showed Prominently distended gallbladder with gallstones and pericholecystic stranding  RUQ Abdominal US showed Distended gallbladder with nonmobile gallstone in the gallbladder neck   with mild gallbladder wall thickening. Findings suggest a high positive   s/p cholecystostomy tube placement by IR  cultures from aspirate growing alpha strep    Acute cholecystitis   Leukocytosis   ESRD    Plan:   continue (Zosyn) Piperacillin/tazobactam 3.375 grams every 12 hours   follow LFTs  pain control   trend wbc until normal   hemodialysis per renal   likely will need antibiotics for 7-14 days from drainage but depending on clinical course     Chi Delcid, DO  Infectious Disease Attending  Reachable via Microsoft Teams or ID office: [phone]  After 5pm/weekends please call [phone] for all inquiries and new consults

## 2022-02-19 NOTE — PROGRESS NOTE ADULT - ASSESSMENT
80 y/o M with pmh of HTN, HLD, ESRD on HD (T.Th/S), hx CVA, AF on eliquis at home, s/p PPM p/w c/o RUQ abd pain- also w/ cough, retching, dry heaves, found to have acute cholecystitis and Gall stone pancreatitis      # Acute Cholecystitis, Gallstone Pancreatitis - seen by surgery- recommend IR eval- seen by IR-s/p IR percutaneous cholecystostomy 2/18/22  NPO, low rate ivf, iv zosyn, analgesics prn,  Eventual Cholecystectomy per surgery- d/w surgery for fu today. FU lipase/ LFT today- d/w lab to add on to am labs sample. will start clear liquids once ok per surgery      # ESRD on HD - nephrology following-  HD per renal. pt missed HD on Thursday-    # Essential HTN - Monitor BP.  c/w home meds/ anti HTNves    # Chronic Afib - AC / ASA on hold- will resume if no immediate surgical intervention reqd. for now ICD, await surgery fu- d/w surgery PA    # ICD for dvt ppx   82 y/o M with pmh of HTN, HLD, ESRD on HD (T.Th/S), hx CVA, AF on eliquis at home, s/p PPM p/w c/o RUQ abd pain- also w/ cough, retching, dry heaves, found to have acute cholecystitis and Gall stone pancreatitis      # Acute Cholecystitis, Gallstone Pancreatitis - seen by surgery- recommend IR eval- seen by IR-s/p IR percutaneous cholecystostomy 2/18/22  NPO, low rate ivf, iv zosyn, analgesics prn,  Eventual Cholecystectomy per surgery- d/w surgery for fu today. FU lipase/ LFT today- d/w lab to add on to am labs sample. will start clear liquids once ok per surgery  follow fluid cx, wbc down trending.       # ESRD on HD - nephrology following-  HD per renal. pt missed HD on Thursday-    # Essential HTN - Monitor BP.  c/w home meds/ anti HTNves    # Chronic Afib - AC / ASA on hold- will resume if no immediate surgical intervention reqd. for now ICD, await surgery fu- d/w surgery PA    # ICD for dvt ppx   80 y/o M with pmh of HTN, HLD, ESRD on HD (T.Th/S), hx CVA, AF on eliquis at home, s/p PPM p/w c/o RUQ abd pain- also w/ cough, retching, dry heaves, found to have acute cholecystitis and Gall stone pancreatitis      # Acute Cholecystitis, Gallstone Pancreatitis - seen by surgery- recommend IR eval- seen by IR-s/p IR percutaneous cholecystostomy 2/18/22  NPO, low rate ivf, iv zosyn, analgesics prn,  Eventual Cholecystectomy per surgery- d/w surgery for fu today. FU lipase/ LFT today- d/w lab to add on to am labs sample. will start clear liquids once ok per surgery  follow fluid cx, wbc down trending.       # ESRD on HD - nephrology following-  HD per renal. pt missed HD on Thursday-    # Essential HTN - Monitor BP.  c/w home meds/ anti HTNves    # Chronic Afib - AC / ASA on hold- will resume if no immediate surgical intervention reqd. for now ICD, await surgery fu- d/w surgery PA    # ICD for dvt ppx      d/w Surgeon_ recommend ok to resume ASA and eliquis as no plans for IP surg intervention and pt  can be fed.

## 2022-02-20 NOTE — PROGRESS NOTE ADULT - SUBJECTIVE AND OBJECTIVE BOX
CHIEF COMPLAINT/INTERVAL HISTORY:    Patient is a 81y old  Male who presents with a chief complaint of Acute Cholecystitis, Gallstone Pancreatitis (19 Feb 2022 20:42)      HPI:             81 year old male w/ PMHx of HTN, HLD, ESRD T/TH/SAT, CVA, Atrial Fibrillation on Eliquis, PPM presents to the ED c/o abdominal pain. Pt reports he woke up Thursday morning w/ RUQ abdominal pain associated w/ persistent cough, n/v (NB/NB), gagging, SOB and wretching. Pt describes pain as a persistent "knot" which didn't improve until arrival to the ED. Pt reports he missed HD due to his sxs. Pt denies fever chills, diarrhea or dysuria (still makes some urine).    In ED initial vitals stable, T max 100.2. Labs sig for WBC 16.11, BUN/Cr 52/7.37, Lipase 714, CT A/P showed Prominently distended gallbladder with gallstones and pericholecystic stranding. There is also mildly dense ascites in the right paracolic gutter and pelvis. The appendix is seen, within normal limits. However, the appendiceal tip is partially obscured by adjacent ascites in the right lower quadrant. Patchy opacity of the right lung base may represent atelectasis and/or infiltrate. Tiny right pleural effusion.  RUQ Abdominal US showed Distended gallbladder with nonmobile gallstone in the gallbladder neck   with mild gallbladder wall thickening. Findings suggest a high positive   predictive value for acute cholecystitis.    Pt given IV Zosyn, Surgery consulted (18 Feb 2022 02:50)      SUBJECTIVE & OBJECTIVE: Pt seen and examined at bedside.   c/o right abdominal discomfort  denies nausea, vomiting  tolerating clear liquids  afebrile     Vital Signs Last 24 Hrs  T(C): 36.9 (20 Feb 2022 05:17), Max: 37.2 (19 Feb 2022 14:29)  T(F): 98.5 (20 Feb 2022 05:17), Max: 98.9 (19 Feb 2022 14:29)  HR: 70 (20 Feb 2022 05:17) (69 - 70)  BP: 116/64 (20 Feb 2022 05:17) (100/63 - 116/64)  BP(mean): --  ABP: --  ABP(mean): --  RR: 17 (20 Feb 2022 05:17) (17 - 18)  SpO2: 96% (20 Feb 2022 05:17) (94% - 98%)        MEDICATIONS  (STANDING):  amLODIPine   Tablet 10 milliGRAM(s) Oral daily  apixaban 2.5 milliGRAM(s) Oral two times a day  aspirin  chewable 81 milliGRAM(s) Oral daily  atorvastatin 10 milliGRAM(s) Oral at bedtime  isosorbide   mononitrate ER Tablet (IMDUR) 30 milliGRAM(s) Oral daily  metoprolol tartrate 25 milliGRAM(s) Oral two times a day  piperacillin/tazobactam IVPB... 3.375 Gram(s) IV Intermittent every 12 hours  sodium bicarbonate 650 milliGRAM(s) Oral two times a day    MEDICATIONS  (PRN):  acetaminophen     Tablet .. 650 milliGRAM(s) Oral every 6 hours PRN Temp greater or equal to 38C (100.4F), Mild Pain (1 - 3)  ondansetron Injectable 4 milliGRAM(s) IV Push every 8 hours PRN Nausea and/or Vomiting  traMADol 50 milliGRAM(s) Oral every 12 hours PRN Moderate Pain (4 - 6)        PHYSICAL EXAM:    GENERAL: NAD, well-groomed, well-developed  HEAD:  Atraumatic, Normocephalic  EYES: EOMI, PERRLA, conjunctiva and sclera clear  ENMT: Moist mucous membranes  NECK: Supple  NERVOUS SYSTEM:  Alert & Oriented X3, normal speech  CHEST/LUNG: Clear to auscultation bilaterally; No rales, rhonchi, wheezing, or rubs  HEART:S1, s2  ABDOMEN: Soft, mild RUQ tenderness, Bowel sounds present, + cholecystostomy tube w/ blood stained discharge  EXTREMITIES: no cyanosis, or edema    LABS:                        11.2   7.81  )-----------( 129      ( 20 Feb 2022 07:50 )             34.9     02-20    139  |  101  |  49<H>  ----------------------------<  89  3.4<L>   |  28  |  6.36<H>    Ca    8.8      20 Feb 2022 07:50    TPro  7.4  /  Alb  2.4<L>  /  TBili  1.3<H>  /  DBili  x   /  AST  35  /  ALT  15  /  AlkPhos  62  02-20

## 2022-02-20 NOTE — PROGRESS NOTE ADULT - ASSESSMENT
82 y/o M with pmh of HTN, HLD, ESRD on HD (T.Th/S), hx CVA, AF on eliquis at home, s/p PPM p/w c/o RUQ abd pain- also w/ cough, retching, dry heaves, found to have acute cholecystitis and Gall stone pancreatitis    - Acute Cholecystitis sec to GS/ Gall stone Pancreatitis - seen by surgery- recommend IR eval- seen by IR-s/p IR percutaneous cholecystostomy 2/18/22  IV zosyn- ID following- FU cultures from IR drainage  Clear liquids- will advance once lipase improves and if no GI w/u planned.   No plans for IP surgical intervention at this per surgery  LFts normal, Lipase slightly elevated  GI eval/ Dr. esposito      -ESRD on HD- nephrology following-  HD per renal. pt missed HD on Thursday-s/p HD yesterday    -HTN- Bp stable-  c/w home meds/ anti HTNves    -Chronic Afib - d/w surgery- > No surgical intervention reqd. for now . and ok to resume ASa and eliquis    - eliquis for dvt ppx-  monitor h/h - hb stable today.     prognosis guarded.

## 2022-02-20 NOTE — CONSULT NOTE ADULT - REASON FOR ADMISSION
Acute Cholecystitis, Gallstone Pancreatitis

## 2022-02-20 NOTE — CONSULT NOTE ADULT - CONSULT REASON
Gallstone pancreatitis acute cholecystitis and S/P PCCT Gallstone pancreatitis acute cholecystitis and S/P PTC

## 2022-02-20 NOTE — CONSULT NOTE ADULT - SUBJECTIVE AND OBJECTIVE BOX
Patient is a 81y old  Male who presents with a chief complaint of Acute Cholecystitis, S/P PCCT on 02/18/2022 and Gallstone Pancreatitis.       HPI:             81 year old male w/ PMHx of HTN, HLD, ESRD T/TH/SAT, CVA, Atrial Fibrillation on Eliquis, PPM presents to the ED c/o abdominal pain. Pt reports he woke up Thursday morning w/ RUQ abdominal pain associated w/ persistent cough, n/v (NB/NB), gagging, SOB and wretching. Pt describes pain as a persistent "knot" which didn't improve until arrival to the ED. Pt reports he missed HD due to his sxs. Pt denies fever chills, diarrhea or dysuria (still makes some urine).    In ED initial vitals stable, T max 100.2. Labs sig for WBC 16.11, BUN/Cr 52/7.37, Lipase 714, CT A/P showed Prominently distended gallbladder with gallstones and pericholecystic stranding. There is also mildly dense ascites in the right paracolic gutter and pelvis. The appendix is seen, within normal limits. However, the appendiceal tip is partially obscured by adjacent ascites in the right lower quadrant. Patchy opacity of the right lung base may represent atelectasis and/or infiltrate. Tiny right pleural effusion.  RUQ Abdominal US showed Distended gallbladder with nonmobile gallstone in the gallbladder neck  with mild gallbladder wall thickening. Findings suggest a high positive  predictive value for acute cholecystitis. Pt given IV Zosyn, Surgery consulted (18 Feb 2022 02:50)      PAST MEDICAL & SURGICAL HISTORY:  Hypertension    Hyperlipidemia, unspecified hyperlipidemia type    Renal insufficiency    Cerebrovascular accident (CVA)    History of pacemaker    S/P hip replacement  BL 2010, 2011        Allergies    No Known Allergies    Intolerances        MEDICATIONS  (STANDING):  amLODIPine   Tablet 10 milliGRAM(s) Oral daily  apixaban 2.5 milliGRAM(s) Oral two times a day  aspirin  chewable 81 milliGRAM(s) Oral daily  atorvastatin 10 milliGRAM(s) Oral at bedtime  isosorbide   mononitrate ER Tablet (IMDUR) 30 milliGRAM(s) Oral daily  metoprolol tartrate 25 milliGRAM(s) Oral two times a day  piperacillin/tazobactam IVPB... 3.375 Gram(s) IV Intermittent every 12 hours  sodium bicarbonate 650 milliGRAM(s) Oral two times a day    MEDICATIONS  (PRN):  acetaminophen     Tablet .. 650 milliGRAM(s) Oral every 6 hours PRN Temp greater or equal to 38C (100.4F), Mild Pain (1 - 3)  ondansetron Injectable 4 milliGRAM(s) IV Push every 8 hours PRN Nausea and/or Vomiting  traMADol 50 milliGRAM(s) Oral every 12 hours PRN Moderate Pain (4 - 6)      FAMILY HISTORY:  No pertinent family history in first degree relatives        Social History:  neg x 3 (18 Feb 2022 02:50)        Review of Systems:  Pertinent ROS as per HPI, otherwise negative  General:  No wt loss, fevers, chills, night sweats,fatigue,   CV:  No pain, palpitatioins, hypo/hypertension  Resp:  No dyspnea, cough, tachypnea, wheezing  GI:  as per HPI  :  No pain, bleeding, incontinence, nocturia  Muscle:  No pain, weakness  Neuro:  No weakness, tingling, memory problems  Psych:  No fatigue, insomnia, mood problems, depression  Endocrine:  No polyuria, polydypsia, cold/heat intolerance  Heme:  No petechiae, ecchymosis, easy bruisability  Skin:  No rash, tattoos, scars, edema      Vital Signs Last 24 Hrs  T(C): 36.7 (20 Feb 2022 11:12), Max: 37.2 (19 Feb 2022 14:29)  T(F): 98.1 (20 Feb 2022 11:12), Max: 98.9 (19 Feb 2022 14:29)  HR: 70 (20 Feb 2022 11:12) (69 - 70)  BP: 100/62 (20 Feb 2022 11:12) (100/62 - 116/64)  BP(mean): --  RR: 17 (20 Feb 2022 11:12) (17 - 18)  SpO2: 96% (20 Feb 2022 11:12) (94% - 97%)      PHYSICAL EXAM:  Constitutional: NAD, well-developed  HEENT: anicteric, EOMI  Neck: No LAD, supple  Cardiovascular: S1 and S2, RRR, no M  Respiratory: CTA and P  Gastrointestinal: BS+, soft, NT/ND, neg HSM,  Extremities: No peripheral edema, neg clubing, cyanosis  Vascular: 2+ peripheral pulses  Neurological: A/O x 3, no focal deficits  Psychiatric: Normal mood, normal affect  Skin: No rashes, normal turgor      LABS:                        11.2   7.81  )-----------( 129      ( 20 Feb 2022 07:50 )             34.9     02-20    139  |  101  |  49<H>  ----------------------------<  89  3.4<L>   |  28  |  6.36<H>    Ca    8.8      20 Feb 2022 07:50    TPro  7.4  /  Alb  2.4<L>  /  TBili  1.3<H>  /  DBili  x   /  AST  35  /  ALT  15  /  AlkPhos  62  02-20        LIVER FUNCTIONS - ( 20 Feb 2022 07:50 )  Alb: 2.4 g/dL / Pro: 7.4 gm/dL / ALK PHOS: 62 U/L / ALT: 15 U/L / AST: 35 U/L / GGT: x             RADIOLOGY & ADDITIONAL TESTS:   Patient is a 81y old  Male who presents with a chief complaint of Acute Cholecystitis, S/P PCCT on 02/18/2022 and Gallstone Pancreatitis consulted for elevated Lipase from 207 to > 400.       HPI:             81 year old male w/ PMHx of HTN, HLD, ESRD T/TH/SAT, CVA, Atrial Fibrillation on Eliquis, PPM presents to the ED c/o abdominal pain. Pt reports he woke up Thursday morning w/ RUQ abdominal pain associated w/ persistent cough, n/v (NB/NB), gagging, SOB and wretching. Pt describes pain as a persistent "knot" which didn't improve until arrival to the ED. Pt reports he missed HD due to his sxs. Pt denies fever chills, diarrhea or dysuria (still makes some urine).    In ED initial vitals stable, T max 100.2. Labs sig for WBC 16.11, BUN/Cr 52/7.37, Lipase 714, CT A/P showed Prominently distended gallbladder with gallstones and pericholecystic stranding. There is also mildly dense ascites in the right paracolic gutter and pelvis. The appendix is seen, within normal limits. However, the appendiceal tip is partially obscured by adjacent ascites in the right lower quadrant. Patchy opacity of the right lung base may represent atelectasis and/or infiltrate. Tiny right pleural effusion.  RUQ Abdominal US showed Distended gallbladder with nonmobile gallstone in the gallbladder neck  with mild gallbladder wall thickening. Findings suggest a high positive  predictive value for acute cholecystitis. Pt given IV Zosyn, Surgery consulted (18 Feb 2022 02:50).   02/18/2022 Patient underwent IR directed successful Percutaneous Cholecystostomy placement under ultrasound and fluoroscopic guidance. Advised to keep PTC for 6-8 weeks for tract to mature followed by cholecystectomy.      PAST MEDICAL & SURGICAL HISTORY:  Hypertension    Hyperlipidemia, unspecified hyperlipidemia type    Renal insufficiency    Cerebrovascular accident (CVA)    History of pacemaker    S/P hip replacement  BL 2010, 2011        Allergies    No Known Allergies    Intolerances        MEDICATIONS  (STANDING):  amLODIPine   Tablet 10 milliGRAM(s) Oral daily  apixaban 2.5 milliGRAM(s) Oral two times a day  aspirin  chewable 81 milliGRAM(s) Oral daily  atorvastatin 10 milliGRAM(s) Oral at bedtime  isosorbide   mononitrate ER Tablet (IMDUR) 30 milliGRAM(s) Oral daily  metoprolol tartrate 25 milliGRAM(s) Oral two times a day  piperacillin/tazobactam IVPB... 3.375 Gram(s) IV Intermittent every 12 hours  sodium bicarbonate 650 milliGRAM(s) Oral two times a day    MEDICATIONS  (PRN):  acetaminophen     Tablet .. 650 milliGRAM(s) Oral every 6 hours PRN Temp greater or equal to 38C (100.4F), Mild Pain (1 - 3)  ondansetron Injectable 4 milliGRAM(s) IV Push every 8 hours PRN Nausea and/or Vomiting  traMADol 50 milliGRAM(s) Oral every 12 hours PRN Moderate Pain (4 - 6)      FAMILY HISTORY:  No pertinent family history in first degree relatives        Social History:  neg x 3 (18 Feb 2022 02:50)        Review of Systems:  Pertinent ROS as per HPI, otherwise negative  General:  No wt loss, fevers, chills, night sweats,fatigue,   CV:  No pain, palpitatioins, hypo/hypertension  Resp:  No dyspnea, cough, tachypnea, wheezing  GI:  as per HPI  :  No pain, bleeding, incontinence, nocturia  Muscle:  No pain, weakness  Neuro:  No weakness, tingling, memory problems  Psych:  No fatigue, insomnia, mood problems, depression  Endocrine:  No polyuria, polydypsia, cold/heat intolerance  Heme:  No petechiae, ecchymosis, easy bruisability  Skin:  No rash, tattoos, scars, edema      Vital Signs Last 24 Hrs  T(C): 36.7 (20 Feb 2022 11:12), Max: 37.2 (19 Feb 2022 14:29)  T(F): 98.1 (20 Feb 2022 11:12), Max: 98.9 (19 Feb 2022 14:29)  HR: 70 (20 Feb 2022 11:12) (69 - 70)  BP: 100/62 (20 Feb 2022 11:12) (100/62 - 116/64)  BP(mean): --  RR: 17 (20 Feb 2022 11:12) (17 - 18)  SpO2: 96% (20 Feb 2022 11:12) (94% - 97%)      PHYSICAL EXAM:  Constitutional: NAD, well-developed  HEENT: anicteric, EOMI  Neck: No LAD, supple  Cardiovascular: S1 and S2, RRR, no M  Respiratory: CTA and P  Gastrointestinal: BS+, soft, NT/ND, neg HSM,  Extremities: No peripheral edema, neg clubing, cyanosis  Vascular: 2+ peripheral pulses  Neurological: A/O x 3, no focal deficits  Psychiatric: Normal mood, normal affect  Skin: No rashes, normal turgor      LABS:                        11.2   7.81  )-----------( 129      ( 20 Feb 2022 07:50 )             34.9     02-20    139  |  101  |  49<H>  ----------------------------<  89  3.4<L>   |  28  |  6.36<H>    Ca    8.8      20 Feb 2022 07:50    TPro  7.4  /  Alb  2.4<L>  /  TBili  1.3<H>  /  DBili  x   /  AST  35  /  ALT  15  /  AlkPhos  62  02-20        LIVER FUNCTIONS - ( 20 Feb 2022 07:50 )  Alb: 2.4 g/dL / Pro: 7.4 gm/dL / ALK PHOS: 62 U/L / ALT: 15 U/L / AST: 35 U/L / GGT: x             RADIOLOGY & ADDITIONAL TESTS:

## 2022-02-20 NOTE — CONSULT NOTE ADULT - ASSESSMENT
82 y/o M with pmh of HTN, HLD, ESRD on HD (T.Th/S), hx CVA, AF on eliquis at home, s/p PPM p/w c/o RUQ abd pain- also w/ cough, retching, dry heaves, found to have acute cholecystitis S/P IR directed PTC on 02/18/2022 and Gall stone pancreatitis     1. Gallstone pancreatitis  -  His Lipase is 407 -> 207 > 407 with decreasing WBC  - Most likely Lipase elevated due to CKD on HD.  - Please keep patient on full liquid diet for now unless increasing WBC or abdominal pain.  - If increasing pain or WBC consider a repeat CT pancreas or a MRCP.      2. Acute Cholecystitis   - Patient should follow up with a surgeon for possible interval cholecystectomy  -If the patient does not have a cholecystectomy in 6 weeks, he should follow up with Interventional radiology for a tube study and evaluation for possible spyglass stone lithotripsy and removal.

## 2022-02-21 NOTE — PROGRESS NOTE ADULT - ASSESSMENT
82 y/o M with pmh of HTN, HLD, ESRD on HD (T.Th/S), hx CVA, AF on eliquis at home, s/p PPM p/w c/o RUQ abd pain- also w/ cough, retching, dry heaves, found to have acute cholecystitis S/P IR directed PTC on 02/18/2022 and Gall stone pancreatitis     1. Gallstone pancreatitis ?  - His Lipase is 407 -> 207 > 407  - D Bili minimally elevated  - Pancreas normal on CT scan   - Most likely Lipase elevated due to CKD on HD.  - Patient w/o any GI symptoms  ===== Doubt patient ever had gallstone pancreatitis. MRCP if LFTs become elevated or patient develops new symptoms      2. Acute Cholecystitis   - Patient should follow up with a surgeon for possible interval cholecystectomy    ==== Will follow on a PRN basis

## 2022-02-21 NOTE — PROGRESS NOTE ADULT - SUBJECTIVE AND OBJECTIVE BOX
Patient is a 81y old  Male who presents with a chief complaint of Acute Cholecystitis, Gallstone Pancreatitis (21 Feb 2022 12:49)      HPI:             81 year old male w/ PMHx of HTN, HLD, ESRD T/TH/SAT, CVA, Atrial Fibrillation on Eliquis, PPM presents to the ED c/o abdominal pain. Pt reports he woke up Thursday morning w/ RUQ abdominal pain associated w/ persistent cough, n/v (NB/NB), gagging, SOB and wretching. Pt describes pain as a persistent "knot" which didn't improve until arrival to the ED. Pt reports he missed HD due to his sxs. Pt denies fever chills, diarrhea or dysuria (still makes some urine).    In ED initial vitals stable, T max 100.2. Labs sig for WBC 16.11, BUN/Cr 52/7.37, Lipase 714, CT A/P showed Prominently distended gallbladder with gallstones and pericholecystic stranding. There is also mildly dense ascites in the right paracolic gutter and pelvis. The appendix is seen, within normal limits. However, the appendiceal tip is partially obscured by adjacent ascites in the right lower quadrant. Patchy opacity of the right lung base may represent atelectasis and/or infiltrate. Tiny right pleural effusion.  RUQ Abdominal US showed Distended gallbladder with nonmobile gallstone in the gallbladder neck   with mild gallbladder wall thickening. Findings suggest a high positive   predictive value for acute cholecystitis.    Pt given IV Zosyn, Surgery consulted (18 Feb 2022 02:50)      INTERVAL HPI/OVERNIGHT EVENTS: Chart reviewed.   The patient denies melena, hematochezia, hematemesis, nausea, vomiting, abdominal pain, constipation, diarrhea, or change in bowel movements     MEDICATIONS  (STANDING):  amLODIPine   Tablet 10 milliGRAM(s) Oral daily  apixaban 2.5 milliGRAM(s) Oral two times a day  aspirin  chewable 81 milliGRAM(s) Oral daily  atorvastatin 10 milliGRAM(s) Oral at bedtime  isosorbide   mononitrate ER Tablet (IMDUR) 30 milliGRAM(s) Oral daily  metoprolol tartrate 25 milliGRAM(s) Oral two times a day  piperacillin/tazobactam IVPB... 3.375 Gram(s) IV Intermittent every 12 hours  sodium bicarbonate 650 milliGRAM(s) Oral two times a day    MEDICATIONS  (PRN):  acetaminophen     Tablet .. 650 milliGRAM(s) Oral every 6 hours PRN Temp greater or equal to 38C (100.4F), Mild Pain (1 - 3)  ondansetron Injectable 4 milliGRAM(s) IV Push every 8 hours PRN Nausea and/or Vomiting  traMADol 50 milliGRAM(s) Oral every 12 hours PRN Moderate Pain (4 - 6)      FAMILY HISTORY:  No pertinent family history in first degree relatives        Allergies    No Known Allergies    Intolerances        PMH/PSH:  Hypertension    Hyperlipidemia, unspecified hyperlipidemia type    Renal insufficiency    Cerebrovascular accident (CVA)    No significant past surgical history    History of pacemaker    S/P hip replacement          REVIEW OF SYSTEMS:  CONSTITUTIONAL: No fever, weight loss,   EYES: No eye pain, visual disturbances, or discharge  ENMT:  No difficulty hearing, tinnitus, vertigo; No sinus or throat pain  NECK: No pain or stiffness  BREASTS: No pain, masses, or nipple discharge  RESPIRATORY: No cough, wheezing, chills or hemoptysis; No shortness of breath  CARDIOVASCULAR: No chest pain, palpitations, dizziness, or leg swelling  GASTROINTESTINAL: See above   GENITOURINARY: No dysuria, frequency, hematuria, or incontinence  NEUROLOGICAL: No headaches, memory loss, loss of strength, numbness, or tremors  SKIN: No itching, burning, rashes, or lesions   LYMPH NODES: No enlarged glands  ENDOCRINE: No heat or cold intolerance; No hair loss  MUSCULOSKELETAL: No joint pain or swelling; No muscle, back, or extremity pain  PSYCHIATRIC: No depression, anxiety, mood swings, or difficulty sleeping  HEME/LYMPH: No easy bruising, or bleeding gums  ALLERGY AND IMMUNOLOGIC: No hives or eczema    Vital Signs Last 24 Hrs  T(C): 36.8 (21 Feb 2022 14:52), Max: 37.2 (21 Feb 2022 01:20)  T(F): 98.3 (21 Feb 2022 14:52), Max: 98.9 (21 Feb 2022 01:20)  HR: 77 (21 Feb 2022 14:52) (67 - 77)  BP: 108/75 (21 Feb 2022 14:52) (108/75 - 139/89)  BP(mean): --  RR: 18 (21 Feb 2022 14:52) (17 - 18)  SpO2: 95% (21 Feb 2022 14:52) (95% - 97%)    PHYSICAL EXAM:  GENERAL: NAD, well-groomed, well-developed  HEAD:  Atraumatic, Normocephalic  EYES: EOMI, PERRLA, conjunctiva and sclera clear  NECK: Supple, No JVD, Normal thyroid  NERVOUS SYSTEM:  Alert & Oriented X3, Good concentration; Motor Strength 5/5 B/L upper and lower extremities;   CHEST/LUNG: Clear to percussion bilaterally; No rales, rhonchi, wheezing, or rubs  HEART: Regular rate and rhythm; No murmurs, rubs, or gallops  ABDOMEN: Soft, Nontender, Nondistended; Bowel sounds present. Cholecystostomy tube (+)  EXTREMITIES:  2+ Peripheral Pulses, No clubbing, cyanosis, or edema  LYMPH: No lymphadenopathy noted  SKIN: No rashes or lesions    LAB  02-21 @ 07:24  amylase --   lipase 507   02-20 @ 07:50  amylase --   lipase 407   02-19 @ 08:17  amylase --   lipase 241   02-18 @ 06:28  amylase --   lipase 429   02-17 @ 20:58  amylase --   lipase 714                           11.3   6.00  )-----------( 153      ( 21 Feb 2022 07:24 )             35.5       CBC:  02-21 @ 07:24  WBC 6.00   Hgb 11.3   Hct 35.5   Plts 153  .5  02-20 @ 07:50  WBC 7.81   Hgb 11.2   Hct 34.9   Plts 129  .3  02-19 @ 08:17  WBC 12.30   Hgb 11.0   Hct 34.5   Plts 115  .9  02-18 @ 06:28  WBC 15.67   Hgb 11.8   Hct 37.8   Plts 144  .0  02-17 @ 20:58  WBC 16.11   Hgb 12.3   Hct 38.0   Plts 162  .8      Chemistry:  02-21 @ 07:24  Na+ 136  K+ 3.6  Cl- 98  CO2 26  BUN 59  Cr 7.53     02-20 @ 07:50  Na+ 139  K+ 3.4  Cl- 101  CO2 28  BUN 49  Cr 6.36     02-19 @ 08:17  Na+ 138  K+ 3.7  Cl- 103  CO2 22  BUN 73  Cr 8.59     02-18 @ 06:28  Na+ 138  K+ 4.0  Cl- 104  CO2 26  BUN 60  Cr 7.66     02-17 @ 20:58  Na+ 139  K+ 3.7  Cl- 104  CO2 24  BUN 52  Cr 7.37         Glucose, Serum: 94 mg/dL (02-21 @ 07:24)  Glucose, Serum: 89 mg/dL (02-20 @ 07:50)  Glucose, Serum: 101 mg/dL (02-19 @ 08:17)  Glucose, Serum: 114 mg/dL (02-18 @ 06:28)  Glucose, Serum: 121 mg/dL (02-17 @ 20:58)      21 Feb 2022 07:24    136    |  98     |  59     ----------------------------<  94     3.6     |  26     |  7.53   20 Feb 2022 07:50    139    |  101    |  49     ----------------------------<  89     3.4     |  28     |  6.36   19 Feb 2022 08:17    138    |  103    |  73     ----------------------------<  101    3.7     |  22     |  8.59   18 Feb 2022 06:28    138    |  104    |  60     ----------------------------<  114    4.0     |  26     |  7.66   17 Feb 2022 20:58    139    |  104    |  52     ----------------------------<  121    3.7     |  24     |  7.37     Ca    8.4        21 Feb 2022 07:24  Ca    8.8        20 Feb 2022 07:50  Ca    8.5        19 Feb 2022 08:17  Ca    8.8        18 Feb 2022 06:28  Ca    8.9        17 Feb 2022 20:58  Phos  5.6       18 Feb 2022 06:28  Mg     2.3       18 Feb 2022 06:28    TPro  7.4    /  Alb  2.4    /  TBili  1.3    /  DBili  x      /  AST  35     /  ALT  15     /  AlkPhos  62     20 Feb 2022 07:50  TPro  7.0    /  Alb  2.5    /  TBili  1.5    /  DBili  0.7    /  AST  20     /  ALT  9      /  AlkPhos  57     19 Feb 2022 08:17  TPro  7.7    /  Alb  3.1    /  TBili  1.3    /  DBili  x      /  AST  16     /  ALT  11     /  AlkPhos  67     18 Feb 2022 06:28  TPro  8.1    /  Alb  3.3    /  TBili  0.9    /  DBili  x      /  AST  21     /  ALT  9      /  AlkPhos  76     17 Feb 2022 20:58              CAPILLARY BLOOD GLUCOSE      POCT Blood Glucose.: 91 mg/dL (21 Feb 2022 11:26)          RADIOLOGY & ADDITIONAL TESTS:    Imaging Personally Reviewed:  [ ] YES  [ ] NO    Consultant(s) Notes Reviewed:  [ ] YES  [ ] NO    Care Discussed with Consultants/Other Providers [ ] YES  [ ] NO

## 2022-02-21 NOTE — PROGRESS NOTE ADULT - SUBJECTIVE AND OBJECTIVE BOX
NEPHROLOGY PROGRESS NOTE    CHIEF COMPLAINT:  ESRD    HPI:  Tolerating clears.      ROS:  denies SOB    EXAM:  T(F): 98.7 (02-21-22 @ 05:37)  HR: 67 (02-21-22 @ 05:37)  BP: 115/71 (02-21-22 @ 05:37)  RR: 17 (02-21-22 @ 05:37)  SpO2: 96% (02-21-22 @ 05:37)    Conversant, in no apparent distress  Normal respiratory effort, lungs clear bilaterally  Heart RRR with no murmur, no peripheral edema         LABS                             11.3   6.00  )-----------( 153      ( 21 Feb 2022 07:24 )             35.5          02-21    136  |  98  |  59<H>  ----------------------------<  94  3.6   |  26  |  7.53<H>    Ca    8.4<L>      21 Feb 2022 07:24    TPro  7.4  /  Alb  2.4<L>  /  TBili  1.3<H>  /  DBili  x   /  AST  35  /  ALT  15  /  AlkPhos  62  02-20             Assessment   Acute cholecystitis s/p perc drain  ESRD on HD TTS    Plan  Next dialysis tomorrow

## 2022-02-21 NOTE — PROGRESS NOTE ADULT - SUBJECTIVE AND OBJECTIVE BOX
INTERVAL HPI/OVERNIGHT EVENTS:  81y  Vital Signs Last 24 Hrs  T(C): 37.1 (21 Feb 2022 05:37), Max: 37.2 (21 Feb 2022 01:20)  T(F): 98.7 (21 Feb 2022 05:37), Max: 98.9 (21 Feb 2022 01:20)  HR: 67 (21 Feb 2022 05:37) (67 - 70)  BP: 115/71 (21 Feb 2022 05:37) (100/62 - 139/89)  BP(mean): --  RR: 17 (21 Feb 2022 05:37) (17 - 18)  SpO2: 96% (21 Feb 2022 05:37) (96% - 97%)  I&O's Summary    20 Feb 2022 07:01  -  21 Feb 2022 07:00  --------------------------------------------------------  IN: 830 mL / OUT: 700 mL / NET: 130 mL      MEDICATIONS  (STANDING):  amLODIPine   Tablet 10 milliGRAM(s) Oral daily  apixaban 2.5 milliGRAM(s) Oral two times a day  aspirin  chewable 81 milliGRAM(s) Oral daily  atorvastatin 10 milliGRAM(s) Oral at bedtime  isosorbide   mononitrate ER Tablet (IMDUR) 30 milliGRAM(s) Oral daily  metoprolol tartrate 25 milliGRAM(s) Oral two times a day  piperacillin/tazobactam IVPB... 3.375 Gram(s) IV Intermittent every 12 hours  sodium bicarbonate 650 milliGRAM(s) Oral two times a day    MEDICATIONS  (PRN):  acetaminophen     Tablet .. 650 milliGRAM(s) Oral every 6 hours PRN Temp greater or equal to 38C (100.4F), Mild Pain (1 - 3)  ondansetron Injectable 4 milliGRAM(s) IV Push every 8 hours PRN Nausea and/or Vomiting  traMADol 50 milliGRAM(s) Oral every 12 hours PRN Moderate Pain (4 - 6)    LABS:    trop                        11.3   6.00  )-----------( 153      ( 21 Feb 2022 07:24 )             35.5     02-21    136  |  98  |  59<H>  ----------------------------<  94  3.6   |  26  |  7.53<H>    Ca    8.4<L>      21 Feb 2022 07:24    TPro  7.4  /  Alb  2.4<L>  /  TBili  1.3<H>  /  DBili  x   /  AST  35  /  ALT  15  /  AlkPhos  62  02-20        CAPILLARY BLOOD GLUCOSE              REVIEW OF SYSTEMS:  CONSTITUTIONAL: No fever, weight loss, or fatigue  EYES: No eye pain, visual disturbances, or discharge  ENMT:  No difficulty hearing, tinnitus, vertigo; No sinus or throat pain  NECK: No pain or stiffness  BREASTS: No pain, masses, or nipple discharge  RESPIRATORY: No cough, wheezing, chills or hemoptysis; No shortness of breath  CARDIOVASCULAR: No chest pain, palpitations, dizziness, or leg swelling  GASTROINTESTINAL: No abdominal or epigastric pain. No nausea, vomiting, or hematemesis; No diarrhea or constipation. No melena or hematochezia.  GENITOURINARY: No dysuria, frequency, hematuria, or incontinence  NEUROLOGICAL: No headaches, memory loss, loss of strength, numbness, or tremors  SKIN: No itching, burning, rashes, or lesions   LYMPH NODES: No enlarged glands  ENDOCRINE: No heat or cold intolerance; No hair loss  MUSCULOSKELETAL: No joint pain or swelling; No muscle, back, or extremity pain  PSYCHIATRIC: No depression, anxiety, mood swings, or difficulty sleeping  HEME/LYMPH: No easy bruising, or bleeding gums  ALLERY AND IMMUNOLOGIC: No hives or eczema    RADIOLOGY & ADDITIONAL TESTS:    Imaging Personally Reviewed:  [ ] YES  [ ] NO    Consultant(s) Notes Reviewed:  [x ] YES  [ ] NO    PHYSICAL EXAM:  GENERAL: NAD, well-groomed, well-developed  HEAD:  Atraumatic, Normocephalic  EYES: EOMI, PERRLA, conjunctiva and sclera clear  ENMT: No tonsillar erythema, exudates, or enlargement; Moist mucous membranes, Good dentition, No lesions  NECK: Supple, No JVD, Normal thyroid  NERVOUS SYSTEM:  Alert & Oriented X3, Good concentration; Motor Strength 5/5 B/L upper and lower extremities; DTRs 2+ intact and symmetric  CHEST/LUNG: Clear to percussion bilaterally; No rales, rhonchi, wheezing, or rubs  HEART: Regular rate and rhythm; No murmurs, rubs, or gallops  ABDOMEN: Soft, Nontender, Nondistended; Bowel sounds present  EXTREMITIES:  2+ Peripheral Pulses, No clubbing, cyanosis, or edema  LYMPH: No lymphadenopathy noted  SKIN: No rashes or lesions    A & P:        Care Discussed with Consultants/Other Providers [ x] YES  [ ] NO     INTERVAL HPI/OVERNIGHT EVENTS: Pt seen and examined at bedside. Denies any complaints.    81y  Vital Signs Last 24 Hrs  T(C): 37.1 (21 Feb 2022 05:37), Max: 37.2 (21 Feb 2022 01:20)  T(F): 98.7 (21 Feb 2022 05:37), Max: 98.9 (21 Feb 2022 01:20)  HR: 67 (21 Feb 2022 05:37) (67 - 70)  BP: 115/71 (21 Feb 2022 05:37) (100/62 - 139/89)  BP(mean): --  RR: 17 (21 Feb 2022 05:37) (17 - 18)  SpO2: 96% (21 Feb 2022 05:37) (96% - 97%)  I&O's Summary    20 Feb 2022 07:01  -  21 Feb 2022 07:00  --------------------------------------------------------  IN: 830 mL / OUT: 700 mL / NET: 130 mL      MEDICATIONS  (STANDING):  amLODIPine   Tablet 10 milliGRAM(s) Oral daily  apixaban 2.5 milliGRAM(s) Oral two times a day  aspirin  chewable 81 milliGRAM(s) Oral daily  atorvastatin 10 milliGRAM(s) Oral at bedtime  isosorbide   mononitrate ER Tablet (IMDUR) 30 milliGRAM(s) Oral daily  metoprolol tartrate 25 milliGRAM(s) Oral two times a day  piperacillin/tazobactam IVPB... 3.375 Gram(s) IV Intermittent every 12 hours  sodium bicarbonate 650 milliGRAM(s) Oral two times a day    MEDICATIONS  (PRN):  acetaminophen     Tablet .. 650 milliGRAM(s) Oral every 6 hours PRN Temp greater or equal to 38C (100.4F), Mild Pain (1 - 3)  ondansetron Injectable 4 milliGRAM(s) IV Push every 8 hours PRN Nausea and/or Vomiting  traMADol 50 milliGRAM(s) Oral every 12 hours PRN Moderate Pain (4 - 6)    LABS:    trop                        11.3   6.00  )-----------( 153      ( 21 Feb 2022 07:24 )             35.5     02-21    136  |  98  |  59<H>  ----------------------------<  94  3.6   |  26  |  7.53<H>    Ca    8.4<L>      21 Feb 2022 07:24    TPro  7.4  /  Alb  2.4<L>  /  TBili  1.3<H>  /  DBili  x   /  AST  35  /  ALT  15  /  AlkPhos  62  02-20        CAPILLARY BLOOD GLUCOSE      REVIEW OF SYSTEMS:  CONSTITUTIONAL: No fever, weight loss, or fatigue  RESPIRATORY: No cough, wheezing, chills or hemoptysis; No shortness of breath  CARDIOVASCULAR: No chest pain, palpitations, dizziness, or leg swelling  GASTROINTESTINAL: No abdominal or epigastric pain. No nausea, vomiting, or hematemesis; No diarrhea or constipation. No melena or hematochezia.  GENITOURINARY: No dysuria, frequency, hematuria, or incontinence  NEUROLOGICAL: No headaches, memory loss, loss of strength, numbness, or tremors  MUSCULOSKELETAL: No joint pain or swelling; No muscle, back, or extremity pain      RADIOLOGY & ADDITIONAL TESTS:    Imaging Personally Reviewed:  [ ] YES  [ ] NO    Consultant(s) Notes Reviewed:  [x ] YES  [ ] NO    PHYSICAL EXAM:    GENERAL: NAD, well-groomed, well-developed  HEAD:  Atraumatic, Normocephalic  EYES: EOMI, PERRLA, conjunctiva and sclera clear  ENMT: Moist mucous membranes  NECK: Supple  NERVOUS SYSTEM:  Alert & Oriented X3, normal speech  CHEST/LUNG: Clear to auscultation bilaterally; No rales, rhonchi, wheezing, or rubs  HEART:S1, s2  ABDOMEN: Soft, mild RUQ tenderness, Bowel sounds present, + cholecystostomy tube w/ blood stained discharge  EXTREMITIES: no cyanosis, or edema      A & P:        Care Discussed with Consultants/Other Providers [ x] YES  [ ] NO

## 2022-02-21 NOTE — PROGRESS NOTE ADULT - ASSESSMENT
82 y/o M with pmh of HTN, HLD, ESRD on HD (T.Th/S), hx CVA, AF on eliquis at home, s/p PPM p/w c/o RUQ abd pain- also w/ cough, retching, dry heaves, found to have acute cholecystitis and Gall stone pancreatitis    - Acute Cholecystitis sec to GS/ Gall stone Pancreatitis - seen by surgery- recommend IR eval- seen by IR-s/p IR percutaneous cholecystostomy 2/18/22  IV zosyn- ID following- FU cultures from IR drainage  Clear liquids- will advance once lipase improves and if no GI w/u planned.   No plans for IP surgical intervention at this per surgery  LFts normal, Lipase slightly elevated  GI eval/ Dr. esposito      -ESRD on HD- nephrology following-  HD per renal. pt missed HD on Thursday-s/p HD yesterday    -HTN- Bp stable-  c/w home meds/ anti HTNves    -Chronic Afib - d/w surgery- > No surgical intervention reqd. for now . and ok to resume ASa and eliquis    - eliquis for dvt ppx-  monitor h/h - hb stable today.     prognosis guarded.  80 y/o M with pmh of HTN, HLD, ESRD on HD (T.Th/S), hx CVA, AF on eliquis at home, s/p PPM p/w c/o RUQ abd pain- also w/ cough, retching, dry heaves, found to have acute cholecystitis and Gall stone pancreatitis    - Acute Cholecystitis sec to GS/ Gall stone Pancreatitis - seen by surgery- recommend IR eval- seen by IR-s/p IR percutaneous cholecystostomy 2/18/22  IV zosyn- ID following- FU cultures from IR drainage  Clear liquids- will advance once lipase improves and if no GI w/u planned.   No plans for IP surgical intervention at this per surgery  LFts normal, Lipase slightly elevated  GI eval/ Dr. esposito    -ESRD on HD- nephrology following-  HD per renal. pt missed HD on Thursday-s/p HD yesterday    -HTN- Bp stable-  c/w home meds/ anti HTNves    -Chronic Afib - d/w surgery- > No surgical intervention reqd. for now . and ok to resume ASa and eliquis    - eliquis for dvt ppx-  monitor h/h - hb stable today.     prognosis guarded.  82 y/o M with pmh of HTN, HLD, ESRD on HD (T.Th/S), hx CVA, AF on eliquis at home, s/p PPM p/w c/o RUQ abd pain- also w/ cough, retching, dry heaves, found to have acute cholecystitis and Gall stone pancreatitis    - Acute Cholecystitis sec to GS/ Gall stone Pancreatitis - seen by surgery- recommend IR eval- seen by IR-s/p IR percutaneous cholecystostomy 2/18/22  IV zosyn- ID following  Cx- Alpha H. Strep.  Clear liquids- will advance once lipase improves and if no GI w/u planned.   No plans for IP surgical intervention at this per surgery  LFts normal, Lipase slightly elevated  GI eval/ Dr. esposito    -ESRD on HD- nephrology following-  HD per renal. pt missed HD on Thursday-s/p HD yesterday    -HTN- Bp stable-  c/w home meds/ anti HTNves    -Chronic Afib - d/w surgery- > No surgical intervention reqd. for now . and ok to resume ASa and eliquis    - eliquis for dvt ppx-  monitor h/h - hb stable today.     prognosis guarded.

## 2022-02-22 NOTE — PROGRESS NOTE ADULT - ASSESSMENT
82 y/o M with pmh of HTN, HLD, ESRD on HD (T.Th/S), hx CVA, AF on eliquis at home, s/p PPM p/w c/o RUQ abd pain- also w/ cough, retching, dry heaves, found to have acute cholecystitis and Gall stone pancreatitis    - Acute Cholecystitis sec to GS/ Gall stone Pancreatitis - seen by surgery- recommend IR eval- seen by IR-s/p IR percutaneous cholecystostomy 2/18/22  IV zosyn- ID following  Cx- Alpha H. Strep.  Clear liquids- will advance once lipase improves and if no GI w/u planned.   No plans for IP surgical intervention at this per surgery  LFts normal, Lipase slightly elevated  GI eval/ Dr. esposito    -ESRD on HD- nephrology following-  HD per renal. pt missed HD on Thursday-s/p HD yesterday    -HTN- Bp stable-  c/w home meds/ anti HTNves    -Chronic Afib - d/w surgery- > No surgical intervention reqd. for now . and ok to resume ASa and eliquis    - eliquis for dvt ppx-  monitor h/h - hb stable today.     prognosis guarded.  80 y/o M with pmh of HTN, HLD, ESRD on HD (T.Th/S), hx CVA, AF on eliquis at home, s/p PPM p/w c/o RUQ abd pain- also w/ cough, retching, dry heaves, found to have acute cholecystitis and Gall stone pancreatitis    - Acute Cholecystitis sec to GS/ Gall stone Pancreatitis - seen by surgery- recommend IR eval- seen by IR-s/p IR percutaneous cholecystostomy 2/18/22  IV zosyn- ID following  Cx- Alpha H. Strep.  Clear liquids- will advance once lipase improves and if no GI w/u planned.   No plans for IP surgical intervention at this per surgery  LFts normal, Lipase slightly elevated  GI on board.  f/u KUB.    -ESRD on HD- nephrology following-  HD per renal. pt missed HD on Thursday-s/p HD yesterday    -HTN- Bp stable-  c/w home meds/ anti HTNves    -Chronic Afib - d/w surgery- > No surgical intervention reqd. for now . and ok to resume ASa and eliquis    - eliquis for dvt ppx-  monitor h/h - hb stable today.     prognosis guarded.

## 2022-02-22 NOTE — PROGRESS NOTE ADULT - ASSESSMENT
82 y/o M with pmh of HTN, HLD, ESRD on HD (T.Th/S), hx CVA, AF on eliquis at home, s/p PPM p/w c/o RUQ abd pain- also w/ cough, retching, dry heaves, found to have acute cholecystitis S/P IR directed PTC on 02/18/2022 and Gall stone pancreatitis     1. Gallstone pancreatitis ?  - His Lipase is 407 -> 207 > 407  - D Bili minimally elevated  - Pancreas normal on CT scan   - Most likely Lipase elevated due to CKD on HD.  - Patient w/o any GI symptoms  ===== Doubt patient ever had gallstone pancreatitis. MRCP if LFTs become elevated or patient develops new symptoms  2. Acute Cholecystitis   - Blood in cholecystotomy tube - Refer to IR   - Patient should follow up with a surgeon for possible interval cholecystectomy  3. Abdominal distention - r/o ileus / obstruction  - KUB ordered

## 2022-02-22 NOTE — CHART NOTE - NSCHARTNOTEFT_GEN_A_CORE
Hospitalist Medicine NP Note.    EVENT: RN Called to see patient with cholecystostomy bag with fresh bloody drainage    SUBJECTIVE: Patient is seen and evaluated, Is alert awake and oriented x 4  denies any pain, discomfort, chest pain, sob, resting comfortable in bed.  patient stated had a good BM this am.   KUB Stat done, pending result.    OBJECTIVE:  Vital Signs Last 24 Hrs  T(C): 37.1 (22 Feb 2022 14:33), Max: 37.1 (22 Feb 2022 12:03)  T(F): 98.7 (22 Feb 2022 14:33), Max: 98.7 (22 Feb 2022 12:03)  HR: 69 (22 Feb 2022 14:33) (64 - 89)  BP: 112/70 (22 Feb 2022 14:33) (104/58 - 134/82)  RR: 18 (22 Feb 2022 14:33) (16 - 20)  SpO2: 97% (22 Feb 2022 14:33) (96% - 100%)    LABS:                        11.2   5.72  )-----------( 179      ( 22 Feb 2022 08:18 )             35.1     02-22    136  |  99  |  66<H>  ----------------------------<  80  4.2   |  27  |  8.66<H>    Ca    8.4<L>      22 Feb 2022 08:18    TPro  7.3  /  Alb  2.3<L>  /  TBili  1.0  /  DBili  x   /  AST  33  /  ALT  18  /  AlkPhos  69  02-22    EKG:   IMGAGING:    ASSESSMENT:  HPI:   81 year old male w/ PMHx of HTN, HLD, ESRD T/TH/SAT, CVA, Atrial Fibrillation on Eliquis, PPM presents to the ED c/o abdominal pain. Pt reports he woke up Thursday morning w/ RUQ abdominal pain associated w/ persistent cough, n/v (NB/NB), gagging, SOB and wretching. Pt describes pain as a persistent "knot" which didn't improve until arrival to the ED. Pt reports he missed HD due to his sxs. Pt denies fever chills, diarrhea or dysuria (still makes some urine).  In ED initial vitals stable, T max 100.2. Labs sig for WBC 16.11, BUN/Cr 52/7.37, Lipase 714, CT A/P showed Prominently distended gallbladder with gallstones and pericholecystic stranding. There is also mildly dense ascites in the right paracolic gutter and pelvis. The appendix is seen, within normal limits. However, the appendiceal tip is partially obscured by adjacent ascites in the right lower quadrant. Patchy opacity of the right lung base may represent atelectasis and/or infiltrate. Tiny right pleural effusion.  RUQ Abdominal US showed Distended gallbladder with nonmobile gallstone in the gallbladder neck   with mild gallbladder wall thickening. Findings suggest a high positive   predictive value for acute cholecystitis.  Pt given IV Zosyn, Surgery consulted (18 Feb 2022 02:50)    Bloody drainage from cholecystostomy tube.     PLAN: CBC, PT, INR STAT.  ELIQUIS PO D/C.  IR TO FOLLOW UP IN AM  SURGERY MADE AWARE.  DR. MAI MADE AWARE.  Dr. Urias aware.     Dee Rich, NP- C

## 2022-02-22 NOTE — PROGRESS NOTE ADULT - SUBJECTIVE AND OBJECTIVE BOX
Patient is a 82y old  Male who presents with a chief complaint of Acute Cholecystitis, Gallstone Pancreatitis (22 Feb 2022 11:27)      HPI:             81 year old male w/ PMHx of HTN, HLD, ESRD T/TH/SAT, CVA, Atrial Fibrillation on Eliquis, PPM presents to the ED c/o abdominal pain. Pt reports he woke up Thursday morning w/ RUQ abdominal pain associated w/ persistent cough, n/v (NB/NB), gagging, SOB and wretching. Pt describes pain as a persistent "knot" which didn't improve until arrival to the ED. Pt reports he missed HD due to his sxs. Pt denies fever chills, diarrhea or dysuria (still makes some urine).    In ED initial vitals stable, T max 100.2. Labs sig for WBC 16.11, BUN/Cr 52/7.37, Lipase 714, CT A/P showed Prominently distended gallbladder with gallstones and pericholecystic stranding. There is also mildly dense ascites in the right paracolic gutter and pelvis. The appendix is seen, within normal limits. However, the appendiceal tip is partially obscured by adjacent ascites in the right lower quadrant. Patchy opacity of the right lung base may represent atelectasis and/or infiltrate. Tiny right pleural effusion.  RUQ Abdominal US showed Distended gallbladder with nonmobile gallstone in the gallbladder neck   with mild gallbladder wall thickening. Findings suggest a high positive   predictive value for acute cholecystitis.    Pt given IV Zosyn, Surgery consulted (18 Feb 2022 02:50)      INTERVAL HPI/OVERNIGHT EVENTS: Patient seen earlier today  Patient c/o of pain at the cholecystomy tube. No other pain but feels distended. No N/V . Tolerating clear liquid diet    MEDICATIONS  (STANDING):  amLODIPine   Tablet 10 milliGRAM(s) Oral daily  apixaban 2.5 milliGRAM(s) Oral two times a day  aspirin  chewable 81 milliGRAM(s) Oral daily  atorvastatin 10 milliGRAM(s) Oral at bedtime  isosorbide   mononitrate ER Tablet (IMDUR) 30 milliGRAM(s) Oral daily  metoprolol tartrate 25 milliGRAM(s) Oral two times a day  piperacillin/tazobactam IVPB... 3.375 Gram(s) IV Intermittent every 12 hours  sodium bicarbonate 650 milliGRAM(s) Oral two times a day    MEDICATIONS  (PRN):  acetaminophen     Tablet .. 650 milliGRAM(s) Oral every 6 hours PRN Temp greater or equal to 38C (100.4F), Mild Pain (1 - 3)  ondansetron Injectable 4 milliGRAM(s) IV Push every 8 hours PRN Nausea and/or Vomiting  traMADol 50 milliGRAM(s) Oral every 12 hours PRN Moderate Pain (4 - 6)      FAMILY HISTORY:  No pertinent family history in first degree relatives        Allergies    No Known Allergies    Intolerances        PMH/PSH:  Hypertension    Hyperlipidemia, unspecified hyperlipidemia type    Renal insufficiency    Cerebrovascular accident (CVA)    No significant past surgical history    History of pacemaker    S/P hip replacement          REVIEW OF SYSTEMS:  CONSTITUTIONAL: No fever, weight loss,   EYES: No eye pain, visual disturbances, or discharge  ENMT:  No difficulty hearing, tinnitus, vertigo; No sinus or throat pain  NECK: No pain or stiffness  BREASTS: No pain, masses, or nipple discharge  RESPIRATORY: No cough, wheezing, chills or hemoptysis; No shortness of breath  CARDIOVASCULAR: No chest pain, palpitations, dizziness, or leg swelling  GASTROINTESTINAL:  See above   GENITOURINARY: No dysuria, frequency, hematuria, or incontinence  NEUROLOGICAL: No headaches, memory loss, loss of strength, numbness, or tremors  SKIN: No itching, burning, rashes, or lesions   LYMPH NODES: No enlarged glands  ENDOCRINE: No heat or cold intolerance; No hair loss  MUSCULOSKELETAL: No joint pain or swelling; No muscle, back, or extremity pain  PSYCHIATRIC: No depression, anxiety, mood swings, or difficulty sleeping  HEME/LYMPH: No easy bruising, or bleeding gums  ALLERGY AND IMMUNOLOGIC: No hives or eczema    Vital Signs Last 24 Hrs  T(C): 37.1 (22 Feb 2022 12:03), Max: 37.1 (22 Feb 2022 12:03)  T(F): 98.7 (22 Feb 2022 12:03), Max: 98.7 (22 Feb 2022 12:03)  HR: 64 (22 Feb 2022 12:03) (64 - 89)  BP: 134/82 (22 Feb 2022 12:03) (104/58 - 134/82)  BP(mean): --  RR: 16 (22 Feb 2022 12:03) (16 - 20)  SpO2: 100% (22 Feb 2022 12:03) (95% - 100%)    PHYSICAL EXAM:  GENERAL: NAD, well-groomed, well-developed  HEAD:  Atraumatic, Normocephalic  EYES: EOMI, PERRLA, conjunctiva and sclera clear  NECK: Supple, No JVD, Normal thyroid  NERVOUS SYSTEM:  Alert & Oriented X3, Good concentration; Motor Strength 5/5 B/L upper and lower extremities;  CHEST/LUNG: Clear to percussion bilaterally; No rales, rhonchi, wheezing, or rubs  HEART: Regular rate and rhythm; No murmurs, rubs, or gallops  ABDOMEN: Soft, Nontender, Distended (+) ; Bowel sounds present. Blood in the cholecystostomy tube   EXTREMITIES:  2+ Peripheral Pulses, No clubbing, cyanosis, or edema  LYMPH: No lymphadenopathy noted  SKIN: No rashes or lesions    LAB  02-21 @ 07:24  amylase --   lipase 507   02-20 @ 07:50  amylase --   lipase 407   02-19 @ 08:17  amylase --   lipase 241   02-18 @ 06:28  amylase --   lipase 429   02-17 @ 20:58  amylase --   lipase 714                           11.2   5.72  )-----------( 179      ( 22 Feb 2022 08:18 )             35.1       CBC:  02-22 @ 08:18  WBC 5.72   Hgb 11.2   Hct 35.1   Plts 179  .9  02-21 @ 07:24  WBC 6.00   Hgb 11.3   Hct 35.5   Plts 153  .5  02-20 @ 07:50  WBC 7.81   Hgb 11.2   Hct 34.9   Plts 129  .3  02-19 @ 08:17  WBC 12.30   Hgb 11.0   Hct 34.5   Plts 115  .9  02-18 @ 06:28  WBC 15.67   Hgb 11.8   Hct 37.8   Plts 144  .0  02-17 @ 20:58  WBC 16.11   Hgb 12.3   Hct 38.0   Plts 162  .8      Chemistry:  02-22 @ 08:18  Na+ 136  K+ 4.2  Cl- 99  CO2 27  BUN 66  Cr 8.66     02-21 @ 07:24  Na+ 136  K+ 3.6  Cl- 98  CO2 26  BUN 59  Cr 7.53     02-20 @ 07:50  Na+ 139  K+ 3.4  Cl- 101  CO2 28  BUN 49  Cr 6.36     02-19 @ 08:17  Na+ 138  K+ 3.7  Cl- 103  CO2 22  BUN 73  Cr 8.59     02-18 @ 06:28  Na+ 138  K+ 4.0  Cl- 104  CO2 26  BUN 60  Cr 7.66     02-17 @ 20:58  Na+ 139  K+ 3.7  Cl- 104  CO2 24  BUN 52  Cr 7.37         Glucose, Serum: 80 mg/dL (02-22 @ 08:18)  Glucose, Serum: 94 mg/dL (02-21 @ 07:24)  Glucose, Serum: 89 mg/dL (02-20 @ 07:50)  Glucose, Serum: 101 mg/dL (02-19 @ 08:17)  Glucose, Serum: 114 mg/dL (02-18 @ 06:28)  Glucose, Serum: 121 mg/dL (02-17 @ 20:58)      22 Feb 2022 08:18    136    |  99     |  66     ----------------------------<  80     4.2     |  27     |  8.66   21 Feb 2022 07:24    136    |  98     |  59     ----------------------------<  94     3.6     |  26     |  7.53   20 Feb 2022 07:50    139    |  101    |  49     ----------------------------<  89     3.4     |  28     |  6.36   19 Feb 2022 08:17    138    |  103    |  73     ----------------------------<  101    3.7     |  22     |  8.59   18 Feb 2022 06:28    138    |  104    |  60     ----------------------------<  114    4.0     |  26     |  7.66   17 Feb 2022 20:58    139    |  104    |  52     ----------------------------<  121    3.7     |  24     |  7.37     Ca    8.4        22 Feb 2022 08:18  Ca    8.4        21 Feb 2022 07:24  Ca    8.8        20 Feb 2022 07:50  Ca    8.5        19 Feb 2022 08:17  Ca    8.8        18 Feb 2022 06:28  Ca    8.9        17 Feb 2022 20:58  Phos  5.6       18 Feb 2022 06:28  Mg     2.3       18 Feb 2022 06:28    TPro  7.3    /  Alb  2.3    /  TBili  1.0    /  DBili  x      /  AST  33     /  ALT  18     /  AlkPhos  69     22 Feb 2022 08:18  TPro  7.4    /  Alb  2.4    /  TBili  1.3    /  DBili  x      /  AST  35     /  ALT  15     /  AlkPhos  62     20 Feb 2022 07:50  TPro  7.0    /  Alb  2.5    /  TBili  1.5    /  DBili  0.7    /  AST  20     /  ALT  9      /  AlkPhos  57     19 Feb 2022 08:17  TPro  7.7    /  Alb  3.1    /  TBili  1.3    /  DBili  x      /  AST  16     /  ALT  11     /  AlkPhos  67     18 Feb 2022 06:28  TPro  8.1    /  Alb  3.3    /  TBili  0.9    /  DBili  x      /  AST  21     /  ALT  9      /  AlkPhos  76     17 Feb 2022 20:58              CAPILLARY BLOOD GLUCOSE              RADIOLOGY & ADDITIONAL TESTS:    Imaging Personally Reviewed:  [ ] YES  [ ] NO    Consultant(s) Notes Reviewed:  [ ] YES  [ ] NO    Care Discussed with Consultants/Other Providers [ ] YES  [ ] NO

## 2022-02-22 NOTE — PROGRESS NOTE ADULT - ASSESSMENT
81 year old male w/ PMHx of HTN, HLD, ESRD T/TH/SAT, CVA, Atrial Fibrillation on Eliquis, PPM presents to the ED c/o abdominal pain found to have gallstone pancreatitis and acute cholecystitis .  T max 100.2.   Labs sig for WBC 16.11, BUN/Cr 52/7.37, Lipase 714  Radiology: CT A/P showed Prominently distended gallbladder with gallstones and pericholecystic stranding  RUQ Abdominal US showed Distended gallbladder with nonmobile gallstone in the gallbladder neck   with mild gallbladder wall thickening. Findings suggest a high positive   s/p cholecystostomy tube placement by IR  cultures from aspirate growing alpha strep    2/22: no fevers, on RA, WBC normalized, LFTs ok, body fluid culture with alpha hemolytic streptococcus, percutaneous drain with bloody fluid in the bag, no longer tender with palpation of surrounding tissues at the drain insertion site, Zosyn IV continued.     Acute cholecystitis   Leukocytosis   ESRD    Plan:   continue (Zosyn) Piperacillin/tazobactam 3.375 grams every 12 hours   follow LFTs  pain control   trend wbc   hemodialysis per renal   likely will need antibiotics for 7-14 days from drainage but depending on clinical course   81 year old male w/ PMHx of HTN, HLD, ESRD T/TH/SAT, CVA, Atrial Fibrillation on Eliquis, PPM presents to the ED c/o abdominal pain found to have gallstone pancreatitis and acute cholecystitis .  T max 100.2.   Labs sig for WBC 16.11, BUN/Cr 52/7.37, Lipase 714  Radiology: CT A/P showed Prominently distended gallbladder with gallstones and pericholecystic stranding  RUQ Abdominal US showed Distended gallbladder with nonmobile gallstone in the gallbladder neck   with mild gallbladder wall thickening. Findings suggest a high positive   s/p cholecystostomy tube placement by IR  cultures from aspirate growing alpha strep    2/22: no fevers, on RA, WBC normalized, LFTs ok, body fluid culture with alpha hemolytic streptococcus, percutaneous drain with bloody fluid in the bag, no longer tender with palpation of surrounding tissues at the drain insertion site, Zosyn IV continued.     Acute cholecystitis   Leukocytosis   ESRD    Plan:   continue (Zosyn) Piperacillin/tazobactam 3.375 grams every 12 hours   follow LFTs  pain control   trend wbc   hemodialysis per renal   continue antibiotics for 2 more days for a total of 7 days from drainage

## 2022-02-22 NOTE — PROGRESS NOTE ADULT - SUBJECTIVE AND OBJECTIVE BOX
INTERVAL HPI/OVERNIGHT EVENTS:  82y  Vital Signs Last 24 Hrs  T(C): 36.8 (22 Feb 2022 08:23), Max: 36.9 (21 Feb 2022 23:49)  T(F): 98.3 (22 Feb 2022 08:23), Max: 98.4 (21 Feb 2022 23:49)  HR: 69 (22 Feb 2022 08:23) (69 - 89)  BP: 109/69 (22 Feb 2022 08:23) (107/66 - 123/80)  BP(mean): --  RR: 18 (22 Feb 2022 08:23) (18 - 18)  SpO2: 96% (22 Feb 2022 08:23) (95% - 99%)  I&O's Summary    21 Feb 2022 07:01  -  22 Feb 2022 07:00  --------------------------------------------------------  IN: 100 mL / OUT: 535 mL / NET: -435 mL      MEDICATIONS  (STANDING):  amLODIPine   Tablet 10 milliGRAM(s) Oral daily  apixaban 2.5 milliGRAM(s) Oral two times a day  aspirin  chewable 81 milliGRAM(s) Oral daily  atorvastatin 10 milliGRAM(s) Oral at bedtime  isosorbide   mononitrate ER Tablet (IMDUR) 30 milliGRAM(s) Oral daily  metoprolol tartrate 25 milliGRAM(s) Oral two times a day  piperacillin/tazobactam IVPB... 3.375 Gram(s) IV Intermittent every 12 hours  sodium bicarbonate 650 milliGRAM(s) Oral two times a day    MEDICATIONS  (PRN):  acetaminophen     Tablet .. 650 milliGRAM(s) Oral every 6 hours PRN Temp greater or equal to 38C (100.4F), Mild Pain (1 - 3)  ondansetron Injectable 4 milliGRAM(s) IV Push every 8 hours PRN Nausea and/or Vomiting  traMADol 50 milliGRAM(s) Oral every 12 hours PRN Moderate Pain (4 - 6)    LABS:    trop                        11.2   5.72  )-----------( 179      ( 22 Feb 2022 08:18 )             35.1     02-22    136  |  99  |  66<H>  ----------------------------<  80  4.2   |  27  |  8.66<H>    Ca    8.4<L>      22 Feb 2022 08:18    TPro  7.3  /  Alb  2.3<L>  /  TBili  1.0  /  DBili  x   /  AST  33  /  ALT  18  /  AlkPhos  69  02-22        CAPILLARY BLOOD GLUCOSE      POCT Blood Glucose.: 91 mg/dL (21 Feb 2022 11:26)      A & P:        Care Discussed with Consultants/Other Providers [ x] YES  [ ] NO     INTERVAL HPI/OVERNIGHT EVENTS: Pt seen and examined at bedside. Denies any complaints.     82y  Vital Signs Last 24 Hrs  T(C): 36.8 (22 Feb 2022 08:23), Max: 36.9 (21 Feb 2022 23:49)  T(F): 98.3 (22 Feb 2022 08:23), Max: 98.4 (21 Feb 2022 23:49)  HR: 69 (22 Feb 2022 08:23) (69 - 89)  BP: 109/69 (22 Feb 2022 08:23) (107/66 - 123/80)  BP(mean): --  RR: 18 (22 Feb 2022 08:23) (18 - 18)  SpO2: 96% (22 Feb 2022 08:23) (95% - 99%)  I&O's Summary    21 Feb 2022 07:01  -  22 Feb 2022 07:00  --------------------------------------------------------  IN: 100 mL / OUT: 535 mL / NET: -435 mL      MEDICATIONS  (STANDING):  amLODIPine   Tablet 10 milliGRAM(s) Oral daily  apixaban 2.5 milliGRAM(s) Oral two times a day  aspirin  chewable 81 milliGRAM(s) Oral daily  atorvastatin 10 milliGRAM(s) Oral at bedtime  isosorbide   mononitrate ER Tablet (IMDUR) 30 milliGRAM(s) Oral daily  metoprolol tartrate 25 milliGRAM(s) Oral two times a day  piperacillin/tazobactam IVPB... 3.375 Gram(s) IV Intermittent every 12 hours  sodium bicarbonate 650 milliGRAM(s) Oral two times a day    MEDICATIONS  (PRN):  acetaminophen     Tablet .. 650 milliGRAM(s) Oral every 6 hours PRN Temp greater or equal to 38C (100.4F), Mild Pain (1 - 3)  ondansetron Injectable 4 milliGRAM(s) IV Push every 8 hours PRN Nausea and/or Vomiting  traMADol 50 milliGRAM(s) Oral every 12 hours PRN Moderate Pain (4 - 6)    LABS:    trop                        11.2   5.72  )-----------( 179      ( 22 Feb 2022 08:18 )             35.1     02-22    136  |  99  |  66<H>  ----------------------------<  80  4.2   |  27  |  8.66<H>    Ca    8.4<L>      22 Feb 2022 08:18    TPro  7.3  /  Alb  2.3<L>  /  TBili  1.0  /  DBili  x   /  AST  33  /  ALT  18  /  AlkPhos  69  02-22        CAPILLARY BLOOD GLUCOSE      POCT Blood Glucose.: 91 mg/dL (21 Feb 2022 11:26)    REVIEW OF SYSTEMS:  CONSTITUTIONAL: No fever, weight loss, or fatigue  RESPIRATORY: No cough, wheezing, chills or hemoptysis; No shortness of breath  CARDIOVASCULAR: No chest pain, palpitations, dizziness, or leg swelling  GASTROINTESTINAL: No abdominal or epigastric pain. No nausea, vomiting, or hematemesis; No diarrhea or constipation. No melena or hematochezia.  GENITOURINARY: No dysuria, frequency, hematuria, or incontinence  NEUROLOGICAL: No headaches, memory loss, loss of strength, numbness, or tremors  MUSCULOSKELETAL: No joint pain or swelling; No muscle, back, or extremity pain      RADIOLOGY & ADDITIONAL TESTS:    Imaging Personally Reviewed:  [ ] YES  [ ] NO    Consultant(s) Notes Reviewed:  [x ] YES  [ ] NO    PHYSICAL EXAM:    GENERAL: NAD, well-groomed, well-developed  HEAD:  Atraumatic, Normocephalic  EYES: EOMI, PERRLA, conjunctiva and sclera clear  ENMT: Moist mucous membranes  NECK: Supple  NERVOUS SYSTEM:  Alert & Oriented X3, normal speech  CHEST/LUNG: Clear to auscultation bilaterally; No rales, rhonchi, wheezing, or rubs  HEART:S1, s2  ABDOMEN: Soft, mild RUQ tenderness, Bowel sounds present, + cholecystostomy tube w/ blood stained discharge  EXTREMITIES: no cyanosis, or edema    A & P:        Care Discussed with Consultants/Other Providers [ x] YES  [ ] NO

## 2022-02-22 NOTE — PROGRESS NOTE ADULT - SUBJECTIVE AND OBJECTIVE BOX
BRITNEY ROSEN  MRN-[id number]    Follow Up:  acute denae    Interval History: the pt was seen and examined earlier in HD, the pt has no new complains, states that his pain at the drain site has improved. The pt is afebrile, on RA, WBC normalized, LFTs ok.     PAST MEDICAL & SURGICAL HISTORY:  Hypertension    Hyperlipidemia, unspecified hyperlipidemia type    Renal insufficiency    Cerebrovascular accident (CVA)    History of pacemaker    S/P hip replacement  BL 2010, 2011        ROS:    [ ] Unobtainable because:  [x ] All other systems negative    Constitutional: no fever, no chills  Head: no trauma  Eyes: no vision changes, no eye pain  ENT:  no sore throat, no rhinorrhea  Cardiovascular:  no chest pain, no palpitation  Respiratory:  no SOB, no cough  GI:  no abd pain, no vomiting, no diarrhea  urinary: no dysuria, no hematuria, no flank pain  musculoskeletal:  no joint pain, no joint swelling  skin:  no rash  neurology:  no headache, no seizure, no change in mental status  psych: no anxiety, no depression         Allergies  No Known Allergies        ANTIMICROBIALS:  piperacillin/tazobactam IVPB... 3.375 every 12 hours      OTHER MEDS:  acetaminophen     Tablet .. 650 milliGRAM(s) Oral every 6 hours PRN  amLODIPine   Tablet 10 milliGRAM(s) Oral daily  apixaban 2.5 milliGRAM(s) Oral two times a day  aspirin  chewable 81 milliGRAM(s) Oral daily  atorvastatin 10 milliGRAM(s) Oral at bedtime  isosorbide   mononitrate ER Tablet (IMDUR) 30 milliGRAM(s) Oral daily  metoprolol tartrate 25 milliGRAM(s) Oral two times a day  ondansetron Injectable 4 milliGRAM(s) IV Push every 8 hours PRN  sodium bicarbonate 650 milliGRAM(s) Oral two times a day  traMADol 50 milliGRAM(s) Oral every 12 hours PRN      Vital Signs Last 24 Hrs  T(C): 37.1 (22 Feb 2022 14:33), Max: 37.1 (22 Feb 2022 12:03)  T(F): 98.7 (22 Feb 2022 14:33), Max: 98.7 (22 Feb 2022 12:03)  HR: 69 (22 Feb 2022 14:33) (64 - 89)  BP: 112/70 (22 Feb 2022 14:33) (104/58 - 134/82)  BP(mean): --  RR: 18 (22 Feb 2022 14:33) (16 - 20)  SpO2: 97% (22 Feb 2022 14:33) (96% - 100%)    Physical Exam:  Constitutional: non-toxic, no distress  HEAD/EYES: anicteric, no conjunctival injection  ENT:  supple, no thrush  Cardiovascular:   normal S1, S2, 3/6 +murmur, no edema  Respiratory:  clear BS bilaterally, no wheezes, no rales  GI:  soft, there is a perc cholecystotomy tube present with blood in the bag, not tender around insertion site, normal bowel sounds  :  no chino, no CVA tenderness  Musculoskeletal:  no synovitis, normal ROM, LUE AVF with palpable thrill  Neurologic: awake and alert, normal strength, no focal findings  Skin:  no rash, no erythema, no phlebitis  Heme/Onc: no lymphadenopathy   Psychiatric:  awake, alert, appropriate mood    WBC Count: 5.72 K/uL (02-22 @ 08:18)  WBC Count: 6.00 K/uL (02-21 @ 07:24)  WBC Count: 7.81 K/uL (02-20 @ 07:50)  WBC Count: 12.30 K/uL (02-19 @ 08:17)  WBC Count: 15.67 K/uL (02-18 @ 06:28)  WBC Count: 16.11 K/uL (02-17 @ 20:58)                            11.2   5.72  )-----------( 179      ( 22 Feb 2022 08:18 )             35.1       02-22    136  |  99  |  66<H>  ----------------------------<  80  4.2   |  27  |  8.66<H>    Ca    8.4<L>      22 Feb 2022 08:18    TPro  7.3  /  Alb  2.3<L>  /  TBili  1.0  /  DBili  x   /  AST  33  /  ALT  18  /  AlkPhos  69  02-22          Creatinine Trend: 8.66<--, 7.53<--, 6.36<--, 8.59<--, 7.66<--, 7.37<--      MICROBIOLOGY:  v  .Body Fluid Bile  02-18-22   Moderate Alpha hemolytic strep  --  Alpha hemolytic strep    SARS-CoV-2 Result: Michaela (02-17-22 @ 20:58)      RADIOLOGY:

## 2022-02-22 NOTE — PROGRESS NOTE ADULT - SUBJECTIVE AND OBJECTIVE BOX
NEPHROLOGY PROGRESS NOTE    CHIEF COMPLAINT:  ESRD    HPI:  Seen on dialysis.  Denies significant pain.  Still on clears.  BP stable.  Access working well.    ROS:  no SOB    EXAM:  T(F): 98.6 (02-22-22 @ 08:40)  HR: 69 (02-22-22 @ 08:40)  BP: 104/58 (02-22-22 @ 08:40)  RR: 20 (02-22-22 @ 08:40)  SpO2: 100% (02-22-22 @ 08:40)    Conversant, in no apparent distress  Normal respiratory effort, lungs clear bilaterally  Heart RRR with no murmur, no peripheral edema         LABS                             11.2   5.72  )-----------( 179      ( 22 Feb 2022 08:18 )             35.1          02-22    136  |  99  |  66<H>  ----------------------------<  80  4.2   |  27  |  8.66<H>    Ca    8.4<L>      22 Feb 2022 08:18    TPro  7.3  /  Alb  2.3<L>  /  TBili  1.0  /  DBili  x   /  AST  33  /  ALT  18  /  AlkPhos  69  02-22    Lipase 507        Assessment   Acute cholecystitis s/p perc drain  ESRD on HD TTS    Plan  Complete dialysis as ordered

## 2022-02-23 NOTE — PROGRESS NOTE ADULT - SUBJECTIVE AND OBJECTIVE BOX
INTERVAL HPI/OVERNIGHT EVENTS: Pt seen and examined at bedside. + bloody drainage.  82y  Vital Signs Last 24 Hrs  T(C): 36.6 (23 Feb 2022 12:18), Max: 37.4 (22 Feb 2022 17:20)  T(F): 97.9 (23 Feb 2022 12:18), Max: 99.4 (22 Feb 2022 17:20)  HR: 69 (23 Feb 2022 12:18) (68 - 69)  BP: 106/68 (23 Feb 2022 12:18) (105/66 - 112/70)  BP(mean): --  RR: 18 (23 Feb 2022 12:18) (18 - 18)  SpO2: 97% (23 Feb 2022 12:18) (97% - 98%)  I&O's Summary    22 Feb 2022 07:01  -  23 Feb 2022 07:00  --------------------------------------------------------  IN: 480 mL / OUT: 470 mL / NET: 10 mL      MEDICATIONS  (STANDING):  amLODIPine   Tablet 10 milliGRAM(s) Oral daily  aspirin  chewable 81 milliGRAM(s) Oral daily  atorvastatin 10 milliGRAM(s) Oral at bedtime  isosorbide   mononitrate ER Tablet (IMDUR) 30 milliGRAM(s) Oral daily  metoprolol tartrate 25 milliGRAM(s) Oral two times a day  piperacillin/tazobactam IVPB... 3.375 Gram(s) IV Intermittent every 12 hours  sodium bicarbonate 650 milliGRAM(s) Oral two times a day    MEDICATIONS  (PRN):  acetaminophen     Tablet .. 650 milliGRAM(s) Oral every 6 hours PRN Temp greater or equal to 38C (100.4F), Mild Pain (1 - 3)  ondansetron Injectable 4 milliGRAM(s) IV Push every 8 hours PRN Nausea and/or Vomiting  traMADol 50 milliGRAM(s) Oral every 12 hours PRN Moderate Pain (4 - 6)    LABS:    trop                        11.2   5.11  )-----------( 181      ( 22 Feb 2022 17:20 )             34.2     02-22    136  |  99  |  66<H>  ----------------------------<  80  4.2   |  27  |  8.66<H>    Ca    8.4<L>      22 Feb 2022 08:18    TPro  7.3  /  Alb  2.3<L>  /  TBili  1.0  /  DBili  x   /  AST  33  /  ALT  18  /  AlkPhos  69  02-22    PT/INR - ( 22 Feb 2022 17:20 )   PT: 18.0 sec;   INR: 1.59 ratio             CAPILLARY BLOOD GLUCOSE              REVIEW OF SYSTEMS:  CONSTITUTIONAL: No fever, weight loss, or fatigue  RESPIRATORY: No cough, wheezing, chills or hemoptysis; No shortness of breath  CARDIOVASCULAR: No chest pain, palpitations, dizziness, or leg swelling  GASTROINTESTINAL: No abdominal or epigastric pain. No nausea, vomiting, or hematemesis; No diarrhea or constipation. No melena or hematochezia.  GENITOURINARY: No dysuria, frequency, hematuria, or incontinence  NEUROLOGICAL: No headaches, memory loss, loss of strength, numbness, or tremors  MUSCULOSKELETAL: No joint pain or swelling; No muscle, back, or extremity pain      RADIOLOGY & ADDITIONAL TESTS:    Imaging Personally Reviewed:  [ ] YES  [ ] NO    Consultant(s) Notes Reviewed:  [x ] YES  [ ] NO    PHYSICAL EXAM:  GENERAL: NAD, well-groomed, well-developed  HEAD:  Atraumatic, Normocephalic  EYES: EOMI, PERRLA, conjunctiva and sclera clear  ENMT: Moist mucous membranes  NECK: Supple  NERVOUS SYSTEM:  Alert & Oriented X3, normal speech  CHEST/LUNG: Clear to auscultation bilaterally; No rales, rhonchi, wheezing, or rubs  HEART:S1, s2  ABDOMEN: Soft, mild RUQ tenderness, Bowel sounds present, + cholecystostomy tube w/ blood stained discharge  EXTREMITIES: no cyanosis, or edema      A & P:        Care Discussed with Consultants/Other Providers [ x] YES  [ ] NO

## 2022-02-23 NOTE — DIETITIAN INITIAL EVALUATION ADULT. - PERTINENT MEDS FT
MEDICATIONS  (STANDING):  amLODIPine   Tablet 10 milliGRAM(s) Oral daily  aspirin  chewable 81 milliGRAM(s) Oral daily  atorvastatin 10 milliGRAM(s) Oral at bedtime  isosorbide   mononitrate ER Tablet (IMDUR) 30 milliGRAM(s) Oral daily  metoprolol tartrate 25 milliGRAM(s) Oral two times a day  piperacillin/tazobactam IVPB... 3.375 Gram(s) IV Intermittent every 12 hours  sodium bicarbonate 650 milliGRAM(s) Oral two times a day    MEDICATIONS  (PRN):  acetaminophen     Tablet .. 650 milliGRAM(s) Oral every 6 hours PRN Temp greater or equal to 38C (100.4F), Mild Pain (1 - 3)  ondansetron Injectable 4 milliGRAM(s) IV Push every 8 hours PRN Nausea and/or Vomiting  traMADol 50 milliGRAM(s) Oral every 12 hours PRN Moderate Pain (4 - 6)

## 2022-02-23 NOTE — DIETITIAN INITIAL EVALUATION ADULT. - ORAL INTAKE PTA/DIET HISTORY
Pt reports good appetite and PO intake PTA. States wife does shopping/cooking at home and uses some salt in cooking. Reports he likes to drink water PTA.

## 2022-02-23 NOTE — PROGRESS NOTE ADULT - ASSESSMENT
80 y/o M with pmh of HTN, HLD, ESRD on HD (T.Th/S), hx CVA, AF on eliquis at home, s/p PPM p/w c/o RUQ abd pain- also w/ cough, retching, dry heaves, found to have acute cholecystitis and Gall stone pancreatitis    1. Acute Cholecystitis sec to GS/ Gall stone Pancreatitis   seen by surgery who recommend IR eval  s/p IR percutaneous cholecystostomy 2/18/22, + bloody tinged output  will hold eliquis.  was evaluated by IR and recommended to hold Eliquis for 48- 72hrs.  Cx- Alpha H. Strep.  c/w Zosyn  LFts normal, Lipase slightly elevated  No surgical intervention at this time, please follow up with Sx as out patient.  recommend IR follow in 4-6wks for tube evaluation. make an appointment with IR by calling the IR booking office at [phone];   GI on board.  ID on board.  f/u KUB.    2. ESRD on HD  nephrology following  HD per renal    3. HTN  Bp stable  c/w metoprolol, amlodipine.    4. Chronic Afib   rate controlled with metoprolol  Hold Eliquis as pt has blood tinged drainage, hold It for 48 - 72 hrs as per IR.    5. DVT ppx  SCD.    prognosis guarded.

## 2022-02-23 NOTE — PROGRESS NOTE ADULT - SUBJECTIVE AND OBJECTIVE BOX
NEPHROLOGY PROGRESS NOTE    CHIEF COMPLAINT:  ESRD    HPI:  Tolerated HD yesterday.  Still with blood drainage from percutaneous cholecystostomy.    ROS:  no SOB    EXAM:  T(F): 97.5 (02-23-22 @ 05:01)  HR: 68 (02-23-22 @ 05:01)  BP: 105/66 (02-23-22 @ 05:01)  RR: 18 (02-23-22 @ 05:01)  SpO2: 97% (02-23-22 @ 05:01)    Conversant, in no apparent distress  Normal respiratory effort, lungs clear bilaterally  Heart RRR with no murmur, no peripheral edema         LABS                             11.2   5.11  )-----------( 181      ( 22 Feb 2022 17:20 )             34.2          02-22    136  |  99  |  66<H>  ----------------------------<  80  4.2   |  27  |  8.66<H>    Ca    8.4<L>      22 Feb 2022 08:18    TPro  7.3  /  Alb  2.3<L>  /  TBili  1.0  /  DBili  x   /  AST  33  /  ALT  18  /  AlkPhos  69  02-22           ASSESSMENT  Acute cholecystitis s/p percutaneous cholecystostomy with bloody drainage  ESRD on HD TTS    RECOMMEND  Resume dialysis tomorrow  Surgical/IR follow up

## 2022-02-23 NOTE — PROGRESS NOTE ADULT - ASSESSMENT
81 year old male w/ PMHx of HTN, HLD, ESRD T/TH/SAT, CVA, Atrial Fibrillation on Eliquis, PPM presents to the ED c/o abdominal pain found to have gallstone pancreatitis and acute cholecystitis .  T max 100.2.   Labs sig for WBC 16.11, BUN/Cr 52/7.37, Lipase 714  Radiology: CT A/P showed Prominently distended gallbladder with gallstones and pericholecystic stranding  RUQ Abdominal US showed Distended gallbladder with nonmobile gallstone in the gallbladder neck   with mild gallbladder wall thickening. Findings suggest a high positive   s/p cholecystostomy tube placement by IR  cultures from aspirate growing alpha strep    2/22: no fevers, on RA, WBC normalized, LFTs ok, body fluid culture with alpha hemolytic streptococcus, percutaneous drain with bloody fluid in the bag, no longer tender with palpation of surrounding tissues at the drain insertion site, Zosyn IV continued.   Attending addendum:  Acute cholecystitis   Leukocytosis   ESRD    Plan:   continue (Zosyn) Piperacillin/tazobactam 3.375 grams every 12 hours   follow LFTs  pain control   trend wbc   hemodialysis per renal   continue antibiotics for 2 more days for a total of 7 days from drainage     2/23: pt is doing well, no fevers, denies any pain, on RA, no new lab work today, Zosyn IV continued, will need 7 days of abx from the day of the drain placement. KUB negative.     Acute cholecystitis   Leukocytosis   ESRD    Plan:   continue (Zosyn) Piperacillin/tazobactam 3.375 grams every 12 hours   follow LFTs  pain control   trend wbc   hemodialysis per renal   continue antibiotics for 1 more days for a total of 7 days from drainage   IR follow up appreciated

## 2022-02-23 NOTE — DIETITIAN INITIAL EVALUATION ADULT. - PERTINENT LABORATORY DATA
02-22 Na136 mmol/L Glu 80 mg/dL K+ 4.2 mmol/L Cr  8.66 mg/dL<H> BUN 66 mg/dL<H> 02-18 Phos 5.6 mg/dL<H> 02-22 Alb 2.3 g/dL<L>

## 2022-02-23 NOTE — DIETITIAN INITIAL EVALUATION ADULT. - OTHER CALCULATIONS
Ht (cm): 185.4cm   Wt (kg): 87.5kg (daily weight 02/22)   BMI: 27.7     IBW: 83.4kg +/- 10% %IBW: 105% UBW: unknown %UBW: n/a

## 2022-02-23 NOTE — PROGRESS NOTE ADULT - SUBJECTIVE AND OBJECTIVE BOX
BRITNEY ROSEN  MRN-[id number]    Follow Up:  acute cholecystitis, s/p drain placement     Interval History: The pt was seen and examined earlier, no distress, no new complains. The pt is afebrile, on RA, no new lab work.     PAST MEDICAL & SURGICAL HISTORY:  Hypertension    Hyperlipidemia, unspecified hyperlipidemia type    Renal insufficiency    Cerebrovascular accident (CVA)    History of pacemaker    S/P hip replacement  BL 2010, 2011        ROS:    [ ] Unobtainable because:  [x ] All other systems negative    Constitutional: no fever, no chills  Head: no trauma  Eyes: no vision changes, no eye pain  ENT:  no sore throat, no rhinorrhea  Cardiovascular:  no chest pain, no palpitation  Respiratory:  no SOB, no cough  GI:  no abd pain, no vomiting, no diarrhea  urinary: no dysuria, no hematuria, no flank pain  musculoskeletal:  no joint pain, no joint swelling  skin:  no rash  neurology:  no headache, no seizure, no change in mental status  psych: no anxiety, no depression         Allergies  No Known Allergies        ANTIMICROBIALS:  piperacillin/tazobactam IVPB... 3.375 every 12 hours      OTHER MEDS:  acetaminophen     Tablet .. 650 milliGRAM(s) Oral every 6 hours PRN  amLODIPine   Tablet 10 milliGRAM(s) Oral daily  aspirin  chewable 81 milliGRAM(s) Oral daily  atorvastatin 10 milliGRAM(s) Oral at bedtime  isosorbide   mononitrate ER Tablet (IMDUR) 30 milliGRAM(s) Oral daily  metoprolol tartrate 25 milliGRAM(s) Oral two times a day  ondansetron Injectable 4 milliGRAM(s) IV Push every 8 hours PRN  sodium bicarbonate 650 milliGRAM(s) Oral two times a day  traMADol 50 milliGRAM(s) Oral every 12 hours PRN      Vital Signs Last 24 Hrs  T(C): 36.6 (23 Feb 2022 12:18), Max: 37.4 (22 Feb 2022 17:20)  T(F): 97.9 (23 Feb 2022 12:18), Max: 99.4 (22 Feb 2022 17:20)  HR: 69 (23 Feb 2022 12:18) (68 - 69)  BP: 106/68 (23 Feb 2022 12:18) (105/66 - 112/70)  BP(mean): --  RR: 18 (23 Feb 2022 12:18) (18 - 18)  SpO2: 97% (23 Feb 2022 12:18) (97% - 98%)    Physical Exam:  Constitutional: non-toxic, no distress  HEAD/EYES: anicteric, no conjunctival injection  ENT:  supple, no thrush  Cardiovascular:   normal S1, S2, 3/6 +murmur, no edema  Respiratory:  clear BS bilaterally, no wheezes, no rales  GI:  soft, there is a perc cholecystotomy tube present with blood in the bag, not tender around insertion site, normal bowel sounds  :  no chino, no CVA tenderness  Musculoskeletal:  no synovitis, normal ROM, LUE AVF with palpable thrill  Neurologic: awake and alert, normal strength, no focal findings  Skin:  no rash, no erythema, no phlebitis  Heme/Onc: no lymphadenopathy   Psychiatric:  awake, alert, appropriate mood    WBC Count: 5.11 K/uL (02-22 @ 17:20)  WBC Count: 5.72 K/uL (02-22 @ 08:18)  WBC Count: 6.00 K/uL (02-21 @ 07:24)  WBC Count: 7.81 K/uL (02-20 @ 07:50)  WBC Count: 12.30 K/uL (02-19 @ 08:17)  WBC Count: 15.67 K/uL (02-18 @ 06:28)  WBC Count: 16.11 K/uL (02-17 @ 20:58)                            11.2   5.11  )-----------( 181      ( 22 Feb 2022 17:20 )             34.2       02-22    136  |  99  |  66<H>  ----------------------------<  80  4.2   |  27  |  8.66<H>    Ca    8.4<L>      22 Feb 2022 08:18    TPro  7.3  /  Alb  2.3<L>  /  TBili  1.0  /  DBili  x   /  AST  33  /  ALT  18  /  AlkPhos  69  02-22          Creatinine Trend: 8.66<--, 7.53<--, 6.36<--, 8.59<--, 7.66<--, 7.37<--      MICROBIOLOGY:  v  .Body Fluid Bile  02-18-22   Moderate Alpha hemolytic strep  --  Alpha hemolytic strep    RADIOLOGY:

## 2022-02-23 NOTE — DIETITIAN INITIAL EVALUATION ADULT. - OTHER INFO
Pt reports he is tolerating clear liquid diet and states he is looking forward to first solid meal. Diet was advanced to regular from clears at time of visit by GI. Pt was clears x 4 days until today (02/23). Denies difficulty chewing/swallowing. Pt denies nausea, vomiting, diarrhea, or constipation. States his weight fluctuates; unable to give UBW.   Discussed importance restricted fluid intake with pt due to HD. Reinforced importance of low sodium diet. Pt reports meeting with RD regularly at outpatient HD center. Pt refuses Nepro x 1/day (provides 425 kcal, 19 g protein) at this time. Discussed increased protein-energy needs due to HD. Pt made aware RD remains available.

## 2022-02-23 NOTE — PROGRESS NOTE ADULT - ASSESSMENT
Interventional Radiology Follow-Up Note  This is a 82y Male s/p perc denae on 2/18 in Interventional Radiology with Dr. Khan.     S: Patient seen and examined @ bedside. No complaints offered.     Medication:  apixaban: (02-22)  aspirin  chewable: (02-22)  isosorbide   mononitrate ER Tablet (IMDUR): (02-22)  metoprolol tartrate: (02-21)  piperacillin/tazobactam IVPB...: (02-23)    Vitals:  T(F): 97.5, Max: 99.4 (17:20)  HR: 68  BP: 105/66  RR: 18  SpO2: 97%    Physical Exam:  General: Nontoxic, in NAD, A&O x3.  Abdomen: soft, NTND, no peritoneal signs.  Drain Device: Drain intact attached to gravity drainage bag. Dressing clean, dry, intact.    LABS:  WBC 5.11 / Hgb 11.2 / Hct 34.2 / Plt 181  Na -- / K -- / CO2 -- / Cl -- / BUN -- / Cr -- / Glucose --  ALT -- / AST -- / Alk Phos -- / Tbili --  Ptt -- / Pt 18.0 / INR 1.59    24hr Drain output: 145cc; blood tinged output; flushed forward with 10cc NS with return of clear bile.     Assessment/Plan: 82 y/o M with pmh of HTN, HLD, ESRD on HD (T.Th/S), hx CVA, AF on eliquis at home, s/p PPM p/w c/o RUQ abd pain- also w/ cough, retching, dry heaves, found to have acute cholecystitis and Gall stone pancreatitis now s/p perc denae.     -continue global management per primary team  -hold AC X 48-72 hours  -monitor h/h; transfuse as needed  -trend vs/labs  -flush drain with 5cc NS daily forward only; DO NOT aspirate  -change dressing q3 days or when dressing is saturated  -regarding outpatient follow up with IR, if the patient is d/c home with drainage catheter they can make an appointment with IR by calling the IR booking office at [phone]; recommend IR follow in 4-6wks for tube evaluation.  -they will benefit from VNS service to help with drainage catheter care; they should continue same drainage catheter care as an outpatient.     Please call IR at extension [phone] with any questions, concerns, or issues regarding above.

## 2022-02-23 NOTE — DIETITIAN INITIAL EVALUATION ADULT. - ETIOLOGY
Inadequate protein-energy intake and increased protein-energy needs in setting of ESRD on HD, extensive cardiac history

## 2022-02-23 NOTE — PROGRESS NOTE ADULT - ASSESSMENT
82 y/o M with pmh of HTN, HLD, ESRD on HD (T.Th/S), hx CVA, AF on eliquis at home, s/p PPM p/w c/o RUQ abd pain- also w/ cough, retching, dry heaves, found to have acute cholecystitis S/P IR directed PTC on 02/18/2022 and Gall stone pancreatitis     1. Gallstone pancreatitis ?  - His Lipase is 407 -> 207 > 407  - D Bili minimally elevated  - Pancreas normal on CT scan   - Most likely Lipase elevated due to CKD on HD.  - Patient w/o any GI symptoms  ===== Doubt patient ever had gallstone pancreatitis. MRCP if LFTs become elevated or patient develops new symptoms  2. Acute Cholecystitis   - Blood in cholecystotomy tube - Refer to IR   - Patient should follow up with a surgeon for possible interval cholecystectomy  3. Abdominal distention - r/o ileus / obstruction  - KUB ordered         80 y/o M with pmh of HTN, HLD, ESRD on HD (T.Th/S), hx CVA, AF on eliquis at home, s/p PPM p/w c/o RUQ abd pain- also w/ cough, retching, dry heaves, found to have acute cholecystitis S/P IR directed PTC on 02/18/2022 and Gall stone pancreatitis     1. Gallstone pancreatitis ?  - His Lipase is 407 -> 207 > 407  - D Bili minimally elevated  - Pancreas normal on CT scan   - Most likely Lipase elevated due to CKD on HD.  - Patient w/o any GI symptoms  ===== Doubt patient ever had gallstone pancreatitis. MRCP if LFTs become elevated or patient develops new symptoms  2. Acute Cholecystitis   - Blood in cholecystotomy tube - Refer to IR   - Patient should follow up with a surgeon for possible interval cholecystectomy  3. Abdominal distention - r/o ileus / obstruction  - KUB - preliminary - negative  ====== Will advance to solid diet and observe

## 2022-02-23 NOTE — PROGRESS NOTE ADULT - SUBJECTIVE AND OBJECTIVE BOX
Patient is a 82y old  Male who presents with a chief complaint of Acute Cholecystitis, Gallstone Pancreatitis (23 Feb 2022 13:36)      HPI:             81 year old male w/ PMHx of HTN, HLD, ESRD T/TH/SAT, CVA, Atrial Fibrillation on Eliquis, PPM presents to the ED c/o abdominal pain. Pt reports he woke up Thursday morning w/ RUQ abdominal pain associated w/ persistent cough, n/v (NB/NB), gagging, SOB and wretching. Pt describes pain as a persistent "knot" which didn't improve until arrival to the ED. Pt reports he missed HD due to his sxs. Pt denies fever chills, diarrhea or dysuria (still makes some urine).    In ED initial vitals stable, T max 100.2. Labs sig for WBC 16.11, BUN/Cr 52/7.37, Lipase 714, CT A/P showed Prominently distended gallbladder with gallstones and pericholecystic stranding. There is also mildly dense ascites in the right paracolic gutter and pelvis. The appendix is seen, within normal limits. However, the appendiceal tip is partially obscured by adjacent ascites in the right lower quadrant. Patchy opacity of the right lung base may represent atelectasis and/or infiltrate. Tiny right pleural effusion.  RUQ Abdominal US showed Distended gallbladder with nonmobile gallstone in the gallbladder neck   with mild gallbladder wall thickening. Findings suggest a high positive   predictive value for acute cholecystitis.    Pt given IV Zosyn, Surgery consulted (18 Feb 2022 02:50)      INTERVAL HPI/OVERNIGHT EVENTS:  The patient denies melena, hematochezia, hematemesis, nausea, vomiting, abdominal pain, constipation, diarrhea, or change in bowel movements Tolerating liquid diet    MEDICATIONS  (STANDING):  amLODIPine   Tablet 10 milliGRAM(s) Oral daily  aspirin  chewable 81 milliGRAM(s) Oral daily  atorvastatin 10 milliGRAM(s) Oral at bedtime  isosorbide   mononitrate ER Tablet (IMDUR) 30 milliGRAM(s) Oral daily  metoprolol tartrate 25 milliGRAM(s) Oral two times a day  piperacillin/tazobactam IVPB... 3.375 Gram(s) IV Intermittent every 12 hours  sodium bicarbonate 650 milliGRAM(s) Oral two times a day    MEDICATIONS  (PRN):  acetaminophen     Tablet .. 650 milliGRAM(s) Oral every 6 hours PRN Temp greater or equal to 38C (100.4F), Mild Pain (1 - 3)  ondansetron Injectable 4 milliGRAM(s) IV Push every 8 hours PRN Nausea and/or Vomiting  traMADol 50 milliGRAM(s) Oral every 12 hours PRN Moderate Pain (4 - 6)      FAMILY HISTORY:  No pertinent family history in first degree relatives        Allergies    No Known Allergies    Intolerances        PMH/PSH:  Hypertension    Hyperlipidemia, unspecified hyperlipidemia type    Renal insufficiency    Cerebrovascular accident (CVA)    No significant past surgical history    History of pacemaker    S/P hip replacement          REVIEW OF SYSTEMS:  CONSTITUTIONAL: No fever, weight loss, or fatigue  EYES: No eye pain, visual disturbances, or discharge  ENMT:  No difficulty hearing, tinnitus, vertigo; No sinus or throat pain  NECK: No pain or stiffness  BREASTS: No pain, masses, or nipple discharge  RESPIRATORY: No cough, wheezing, chills or hemoptysis; No shortness of breath  CARDIOVASCULAR: No chest pain, palpitations, dizziness, or leg swelling  GASTROINTESTINAL: See above   GENITOURINARY: No dysuria, frequency, hematuria, or incontinence  NEUROLOGICAL: No headaches, memory loss, numbness, or tremors  SKIN: No itching, burning, rashes, or lesions   LYMPH NODES: No enlarged glands  ENDOCRINE: No heat or cold intolerance; No hair loss  MUSCULOSKELETAL: No joint pain or swelling; No muscle, back, or extremity pain  PSYCHIATRIC: No depression, anxiety, mood swings, or difficulty sleeping  HEME/LYMPH: No easy bruising, or bleeding gums  ALLERGY AND IMMUNOLOGIC: No hives or eczema    Vital Signs Last 24 Hrs  T(C): 36.6 (23 Feb 2022 12:18), Max: 37.4 (22 Feb 2022 17:20)  T(F): 97.9 (23 Feb 2022 12:18), Max: 99.4 (22 Feb 2022 17:20)  HR: 69 (23 Feb 2022 12:18) (68 - 69)  BP: 106/68 (23 Feb 2022 12:18) (105/66 - 112/70)  BP(mean): --  RR: 18 (23 Feb 2022 12:18) (18 - 18)  SpO2: 97% (23 Feb 2022 12:18) (97% - 98%)    PHYSICAL EXAM:  GENERAL: NAD, well-groomed, well-developed  HEAD:  Atraumatic, Normocephalic  EYES: EOMI, PERRLA, conjunctiva and sclera clear  NECK: Supple, No JVD, Normal thyroid  NERVOUS SYSTEM:  Alert & Oriented X3, Good concentration; Motor Strength 5/5 B/L upper and lower extremities  CHEST/LUNG: Clear to percussion bilaterally; No rales, rhonchi, wheezing, or rubs  HEART: Regular rate and rhythm; No murmurs, rubs, or gallops  ABDOMEN: Soft, Nontender, Nondistended; Bowel sounds present  EXTREMITIES:  2+ Peripheral Pulses, No clubbing, cyanosis, or edema  LYMPH: No lymphadenopathy noted  SKIN: No rashes or lesions    LAB  02-21 @ 07:24  amylase --   lipase 507   02-20 @ 07:50  amylase --   lipase 407   02-19 @ 08:17  amylase --   lipase 241                           11.2   5.11  )-----------( 181      ( 22 Feb 2022 17:20 )             34.2       CBC:  02-22 @ 17:20  WBC 5.11   Hgb 11.2   Hct 34.2   Plts 181  .2  02-22 @ 08:18  WBC 5.72   Hgb 11.2   Hct 35.1   Plts 179  .9  02-21 @ 07:24  WBC 6.00   Hgb 11.3   Hct 35.5   Plts 153  .5  02-20 @ 07:50  WBC 7.81   Hgb 11.2   Hct 34.9   Plts 129  .3  02-19 @ 08:17  WBC 12.30   Hgb 11.0   Hct 34.5   Plts 115  .9  02-18 @ 06:28  WBC 15.67   Hgb 11.8   Hct 37.8   Plts 144  .0  02-17 @ 20:58  WBC 16.11   Hgb 12.3   Hct 38.0   Plts 162  .8      Chemistry:  02-22 @ 08:18  Na+ 136  K+ 4.2  Cl- 99  CO2 27  BUN 66  Cr 8.66     02-21 @ 07:24  Na+ 136  K+ 3.6  Cl- 98  CO2 26  BUN 59  Cr 7.53     02-20 @ 07:50  Na+ 139  K+ 3.4  Cl- 101  CO2 28  BUN 49  Cr 6.36     02-19 @ 08:17  Na+ 138  K+ 3.7  Cl- 103  CO2 22  BUN 73  Cr 8.59     02-18 @ 06:28  Na+ 138  K+ 4.0  Cl- 104  CO2 26  BUN 60  Cr 7.66     02-17 @ 20:58  Na+ 139  K+ 3.7  Cl- 104  CO2 24  BUN 52  Cr 7.37         Glucose, Serum: 80 mg/dL (02-22 @ 08:18)  Glucose, Serum: 94 mg/dL (02-21 @ 07:24)  Glucose, Serum: 89 mg/dL (02-20 @ 07:50)  Glucose, Serum: 101 mg/dL (02-19 @ 08:17)  Glucose, Serum: 114 mg/dL (02-18 @ 06:28)  Glucose, Serum: 121 mg/dL (02-17 @ 20:58)      22 Feb 2022 08:18    136    |  99     |  66     ----------------------------<  80     4.2     |  27     |  8.66   21 Feb 2022 07:24    136    |  98     |  59     ----------------------------<  94     3.6     |  26     |  7.53   20 Feb 2022 07:50    139    |  101    |  49     ----------------------------<  89     3.4     |  28     |  6.36   19 Feb 2022 08:17    138    |  103    |  73     ----------------------------<  101    3.7     |  22     |  8.59   18 Feb 2022 06:28    138    |  104    |  60     ----------------------------<  114    4.0     |  26     |  7.66   17 Feb 2022 20:58    139    |  104    |  52     ----------------------------<  121    3.7     |  24     |  7.37     Ca    8.4        22 Feb 2022 08:18  Ca    8.4        21 Feb 2022 07:24  Ca    8.8        20 Feb 2022 07:50  Ca    8.5        19 Feb 2022 08:17  Ca    8.8        18 Feb 2022 06:28  Ca    8.9        17 Feb 2022 20:58  Phos  5.6       18 Feb 2022 06:28  Mg     2.3       18 Feb 2022 06:28    TPro  7.3    /  Alb  2.3    /  TBili  1.0    /  DBili  x      /  AST  33     /  ALT  18     /  AlkPhos  69     22 Feb 2022 08:18  TPro  7.4    /  Alb  2.4    /  TBili  1.3    /  DBili  x      /  AST  35     /  ALT  15     /  AlkPhos  62     20 Feb 2022 07:50  TPro  7.0    /  Alb  2.5    /  TBili  1.5    /  DBili  0.7    /  AST  20     /  ALT  9      /  AlkPhos  57     19 Feb 2022 08:17  TPro  7.7    /  Alb  3.1    /  TBili  1.3    /  DBili  x      /  AST  16     /  ALT  11     /  AlkPhos  67     18 Feb 2022 06:28  TPro  8.1    /  Alb  3.3    /  TBili  0.9    /  DBili  x      /  AST  21     /  ALT  9      /  AlkPhos  76     17 Feb 2022 20:58      PT/INR - ( 22 Feb 2022 17:20 )   PT: 18.0 sec;   INR: 1.59 ratio                 CAPILLARY BLOOD GLUCOSE              RADIOLOGY & ADDITIONAL TESTS:      Imaging Personally Reviewed:  [ ] YES  [ ] NO    Consultant(s) Notes Reviewed:  [ ] YES  [ ] NO    Care Discussed with Consultants/Other Providers [ ] YES  [ ] NO Patient is a 82y old  Male who presents with a chief complaint of Acute Cholecystitis, Gallstone Pancreatitis (23 Feb 2022 13:36)      HPI:             81 year old male w/ PMHx of HTN, HLD, ESRD T/TH/SAT, CVA, Atrial Fibrillation on Eliquis, PPM presents to the ED c/o abdominal pain. Pt reports he woke up Thursday morning w/ RUQ abdominal pain associated w/ persistent cough, n/v (NB/NB), gagging, SOB and wretching. Pt describes pain as a persistent "knot" which didn't improve until arrival to the ED. Pt reports he missed HD due to his sxs. Pt denies fever chills, diarrhea or dysuria (still makes some urine).    In ED initial vitals stable, T max 100.2. Labs sig for WBC 16.11, BUN/Cr 52/7.37, Lipase 714, CT A/P showed Prominently distended gallbladder with gallstones and pericholecystic stranding. There is also mildly dense ascites in the right paracolic gutter and pelvis. The appendix is seen, within normal limits. However, the appendiceal tip is partially obscured by adjacent ascites in the right lower quadrant. Patchy opacity of the right lung base may represent atelectasis and/or infiltrate. Tiny right pleural effusion.  RUQ Abdominal US showed Distended gallbladder with nonmobile gallstone in the gallbladder neck   with mild gallbladder wall thickening. Findings suggest a high positive   predictive value for acute cholecystitis.    Pt given IV Zosyn, Surgery consulted (18 Feb 2022 02:50)      INTERVAL HPI/OVERNIGHT EVENTS:  Chart reviewed  The patient denies melena, hematochezia, hematemesis, nausea, vomiting, abdominal pain, constipation, diarrhea, or change in bowel movements Tolerating liquid diet    MEDICATIONS  (STANDING):  amLODIPine   Tablet 10 milliGRAM(s) Oral daily  aspirin  chewable 81 milliGRAM(s) Oral daily  atorvastatin 10 milliGRAM(s) Oral at bedtime  isosorbide   mononitrate ER Tablet (IMDUR) 30 milliGRAM(s) Oral daily  metoprolol tartrate 25 milliGRAM(s) Oral two times a day  piperacillin/tazobactam IVPB... 3.375 Gram(s) IV Intermittent every 12 hours  sodium bicarbonate 650 milliGRAM(s) Oral two times a day    MEDICATIONS  (PRN):  acetaminophen     Tablet .. 650 milliGRAM(s) Oral every 6 hours PRN Temp greater or equal to 38C (100.4F), Mild Pain (1 - 3)  ondansetron Injectable 4 milliGRAM(s) IV Push every 8 hours PRN Nausea and/or Vomiting  traMADol 50 milliGRAM(s) Oral every 12 hours PRN Moderate Pain (4 - 6)      FAMILY HISTORY:  No pertinent family history in first degree relatives        Allergies    No Known Allergies    Intolerances        PMH/PSH:  Hypertension    Hyperlipidemia, unspecified hyperlipidemia type    Renal insufficiency    Cerebrovascular accident (CVA)    No significant past surgical history    History of pacemaker    S/P hip replacement          REVIEW OF SYSTEMS:  CONSTITUTIONAL: No fever, weight loss, or fatigue  EYES: No eye pain, visual disturbances, or discharge  ENMT:  No difficulty hearing, tinnitus, vertigo; No sinus or throat pain  NECK: No pain or stiffness  BREASTS: No pain, masses, or nipple discharge  RESPIRATORY: No cough, wheezing, chills or hemoptysis; No shortness of breath  CARDIOVASCULAR: No chest pain, palpitations, dizziness, or leg swelling  GASTROINTESTINAL: See above   GENITOURINARY: No dysuria, frequency, hematuria, or incontinence  NEUROLOGICAL: No headaches, memory loss, numbness, or tremors  SKIN: No itching, burning, rashes, or lesions   LYMPH NODES: No enlarged glands  ENDOCRINE: No heat or cold intolerance; No hair loss  MUSCULOSKELETAL: No joint pain or swelling; No muscle, back, or extremity pain  PSYCHIATRIC: No depression, anxiety, mood swings, or difficulty sleeping  HEME/LYMPH: No easy bruising, or bleeding gums  ALLERGY AND IMMUNOLOGIC: No hives or eczema    Vital Signs Last 24 Hrs  T(C): 36.6 (23 Feb 2022 12:18), Max: 37.4 (22 Feb 2022 17:20)  T(F): 97.9 (23 Feb 2022 12:18), Max: 99.4 (22 Feb 2022 17:20)  HR: 69 (23 Feb 2022 12:18) (68 - 69)  BP: 106/68 (23 Feb 2022 12:18) (105/66 - 112/70)  BP(mean): --  RR: 18 (23 Feb 2022 12:18) (18 - 18)  SpO2: 97% (23 Feb 2022 12:18) (97% - 98%)    PHYSICAL EXAM:  GENERAL: NAD, well-groomed, well-developed  HEAD:  Atraumatic, Normocephalic  EYES: EOMI, PERRLA, conjunctiva and sclera clear  NECK: Supple, No JVD, Normal thyroid  NERVOUS SYSTEM:  Alert & Oriented X3, Good concentration; Motor Strength 5/5 B/L upper and lower extremities  CHEST/LUNG: Clear to percussion bilaterally; No rales, rhonchi, wheezing, or rubs  HEART: Regular rate and rhythm; No murmurs, rubs, or gallops  ABDOMEN: Soft, Nontender, Nondistended; Bowel sounds present  EXTREMITIES:  2+ Peripheral Pulses, No clubbing, cyanosis, or edema  LYMPH: No lymphadenopathy noted  SKIN: No rashes or lesions    LAB  02-21 @ 07:24  amylase --   lipase 507   02-20 @ 07:50  amylase --   lipase 407   02-19 @ 08:17  amylase --   lipase 241                           11.2   5.11  )-----------( 181      ( 22 Feb 2022 17:20 )             34.2       CBC:  02-22 @ 17:20  WBC 5.11   Hgb 11.2   Hct 34.2   Plts 181  .2  02-22 @ 08:18  WBC 5.72   Hgb 11.2   Hct 35.1   Plts 179  .9  02-21 @ 07:24  WBC 6.00   Hgb 11.3   Hct 35.5   Plts 153  .5  02-20 @ 07:50  WBC 7.81   Hgb 11.2   Hct 34.9   Plts 129  .3  02-19 @ 08:17  WBC 12.30   Hgb 11.0   Hct 34.5   Plts 115  .9  02-18 @ 06:28  WBC 15.67   Hgb 11.8   Hct 37.8   Plts 144  .0  02-17 @ 20:58  WBC 16.11   Hgb 12.3   Hct 38.0   Plts 162  .8      Chemistry:  02-22 @ 08:18  Na+ 136  K+ 4.2  Cl- 99  CO2 27  BUN 66  Cr 8.66     02-21 @ 07:24  Na+ 136  K+ 3.6  Cl- 98  CO2 26  BUN 59  Cr 7.53     02-20 @ 07:50  Na+ 139  K+ 3.4  Cl- 101  CO2 28  BUN 49  Cr 6.36     02-19 @ 08:17  Na+ 138  K+ 3.7  Cl- 103  CO2 22  BUN 73  Cr 8.59     02-18 @ 06:28  Na+ 138  K+ 4.0  Cl- 104  CO2 26  BUN 60  Cr 7.66     02-17 @ 20:58  Na+ 139  K+ 3.7  Cl- 104  CO2 24  BUN 52  Cr 7.37         Glucose, Serum: 80 mg/dL (02-22 @ 08:18)  Glucose, Serum: 94 mg/dL (02-21 @ 07:24)  Glucose, Serum: 89 mg/dL (02-20 @ 07:50)  Glucose, Serum: 101 mg/dL (02-19 @ 08:17)  Glucose, Serum: 114 mg/dL (02-18 @ 06:28)  Glucose, Serum: 121 mg/dL (02-17 @ 20:58)      22 Feb 2022 08:18    136    |  99     |  66     ----------------------------<  80     4.2     |  27     |  8.66   21 Feb 2022 07:24    136    |  98     |  59     ----------------------------<  94     3.6     |  26     |  7.53   20 Feb 2022 07:50    139    |  101    |  49     ----------------------------<  89     3.4     |  28     |  6.36   19 Feb 2022 08:17    138    |  103    |  73     ----------------------------<  101    3.7     |  22     |  8.59   18 Feb 2022 06:28    138    |  104    |  60     ----------------------------<  114    4.0     |  26     |  7.66   17 Feb 2022 20:58    139    |  104    |  52     ----------------------------<  121    3.7     |  24     |  7.37     Ca    8.4        22 Feb 2022 08:18  Ca    8.4        21 Feb 2022 07:24  Ca    8.8        20 Feb 2022 07:50  Ca    8.5        19 Feb 2022 08:17  Ca    8.8        18 Feb 2022 06:28  Ca    8.9        17 Feb 2022 20:58  Phos  5.6       18 Feb 2022 06:28  Mg     2.3       18 Feb 2022 06:28    TPro  7.3    /  Alb  2.3    /  TBili  1.0    /  DBili  x      /  AST  33     /  ALT  18     /  AlkPhos  69     22 Feb 2022 08:18  TPro  7.4    /  Alb  2.4    /  TBili  1.3    /  DBili  x      /  AST  35     /  ALT  15     /  AlkPhos  62     20 Feb 2022 07:50  TPro  7.0    /  Alb  2.5    /  TBili  1.5    /  DBili  0.7    /  AST  20     /  ALT  9      /  AlkPhos  57     19 Feb 2022 08:17  TPro  7.7    /  Alb  3.1    /  TBili  1.3    /  DBili  x      /  AST  16     /  ALT  11     /  AlkPhos  67     18 Feb 2022 06:28  TPro  8.1    /  Alb  3.3    /  TBili  0.9    /  DBili  x      /  AST  21     /  ALT  9      /  AlkPhos  76     17 Feb 2022 20:58      PT/INR - ( 22 Feb 2022 17:20 )   PT: 18.0 sec;   INR: 1.59 ratio                 CAPILLARY BLOOD GLUCOSE              RADIOLOGY & ADDITIONAL TESTS:      Imaging Personally Reviewed:  [ ] YES  [ ] NO    Consultant(s) Notes Reviewed:  [ ] YES  [ ] NO    Care Discussed with Consultants/Other Providers [ ] YES  [ ] NO

## 2022-02-24 NOTE — PROGRESS NOTE ADULT - ATTENDING COMMENTS
agree with above   one more day of antibiotics   drain management per IR and surgery   ensure patient follows up with surgery to schedule elective surgical removal of gallbladder  Patient is functional, drives to hemodialysis and grocery stores  he is doing much better, d/c planning     Chi Delcid,   Infectious Disease Attending  Reachable via [employer] Teams or ID office: [phone]  After 5pm/weekends please call [phone] for all inquiries and new consults
Acute cholecystitis   Leukocytosis   ESRD    Plan:   continue (Zosyn) Piperacillin/tazobactam 3.375 grams every 12 hours   follow LFTs  pain control   trend wbc   hemodialysis per renal   continue antibiotics for 2 more days for a total of 7 days from drainage     Chi Delcid DO  Infectious Disease Attending  Reachable via Microsoft Teams or ID office: [phone]  After 5pm/weekends please call [phone] for all inquiries and new consults
completed antibiotics   will need to be discharge with drain and have follow up with surgery and IR  seems to be bleeding into drain, blood thinner stopped and will re-assess tomorrow   no ID contraindication to discharge once confirmed not bleeding     will sign off. Please call with questions    D/W Dr. Radha Delcid, DO  Infectious Disease Attending  Reachable via Microsoft Teams or ID office: [phone]  After 5pm/weekends please call [phone] for all inquiries and new consults

## 2022-02-24 NOTE — PROGRESS NOTE ADULT - ASSESSMENT
82 y/o M with pmh of HTN, HLD, ESRD on HD (T.Th/S), hx CVA, AF on eliquis at home, s/p PPM p/w c/o RUQ abd pain- also w/ cough, retching, dry heaves, found to have acute cholecystitis S/P IR directed PTC on 02/18/2022 and Gall stone pancreatitis     1. Gallstone pancreatitis ?  - His Lipase is 407 -> 207 > 407  - D Bili minimally elevated  - Pancreas normal on CT scan   - Most likely Lipase elevated due to CKD on HD.  - Patient w/o any GI symptoms  ===== Doubt patient ever had gallstone pancreatitis. MRCP if LFTs become elevated or patient develops new symptoms  2. Acute Cholecystitis   - Blood in cholecystotomy tube - Refer to IR   - Patient should follow up with a surgeon for possible interval cholecystectomy  3. Abdominal distention - r/o ileus / obstruction  - KUB - preliminary - negative  ====== Will advance to solid diet and observe         82 y/o M with pmh of HTN, HLD, ESRD on HD (T.Th/S), hx CVA, AF on eliquis at home, s/p PPM p/w c/o RUQ abd pain- also w/ cough, retching, dry heaves, found to have acute cholecystitis S/P IR directed PTC on 02/18/2022 and Gall stone pancreatitis     1. Gallstone pancreatitis ?  - His Lipase is 407 -> 207 > 407  - D Bili minimally elevated  - Pancreas normal on CT scan   - Most likely Lipase elevated due to CKD on HD.  - Patient w/o any GI symptoms  ===== Doubt patient ever had gallstone pancreatitis. MRCP if LFTs become elevated or patient develops new symptoms  2. Acute Cholecystitis   - Blood in cholecystotomy tube - Refer to IR   - Patient should follow up with a surgeon for possible interval cholecystectomy  3. Abdominal distention - r/o ileus / obstruction  - KUB  - negative  - Tolerating solid diet  ====== Patient doing well  =============== Will follow on a PRN basis for now.

## 2022-02-24 NOTE — PROGRESS NOTE ADULT - ASSESSMENT
80 y/o M with pmh of HTN, HLD, ESRD on HD (T.Th/S), hx CVA, AF on eliquis at home, s/p PPM p/w c/o RUQ abd pain- also w/ cough, retching, dry heaves, found to have acute cholecystitis and Gall stone pancreatitis    1. Acute Cholecystitis sec to GS/ Gall stone Pancreatitis   seen by surgery who recommend IR eval  s/p IR percutaneous cholecystostomy 2/18/22, + bloody tinged output  will hold eliquis.  was evaluated by IR and recommended to hold Eliquis for 48- 72hrs.  Cx- Alpha H. Strep.  c/w Zosyn  LFts normal, Lipase slightly elevated  No surgical intervention at this time, please follow up with Sx as out patient.  recommend IR follow in 4-6wks for tube evaluation. make an appointment with IR by calling the IR booking office at [phone];   GI on board.  ID on board.  f/u KUB.    2. ESRD on HD  nephrology following  HD per renal    3. HTN  Bp stable  c/w metoprolol, amlodipine.    4. Chronic Afib   rate controlled with metoprolol  Hold Eliquis (2/22) as pt has blood tinged drainage, hold It for 48 - 72 hrs as per IR.    5. DVT ppx  SCD.    prognosis guarded.  82 y/o M with pmh of HTN, HLD, ESRD on HD (T.Th/S), hx CVA, AF on eliquis at home, s/p PPM p/w c/o RUQ abd pain- also w/ cough, retching, dry heaves, found to have acute cholecystitis and Gall stone pancreatitis    1. Acute Cholecystitis sec to GS/ Gall stone Pancreatitis   seen by surgery who recommend IR eval  s/p IR percutaneous cholecystostomy 2/18/22, + bloody tinged output  will hold eliquis.  was evaluated by IR and recommended to hold Eliquis for 48- 72hrs.  Cx- Alpha H. Strep.  c/w Zosyn  LFts normal, Lipase slightly elevated  No surgical intervention at this time, please follow up with Sx as out patient.  recommend IR follow in 4-6wks for tube evaluation. make an appointment with IR by calling the IR booking office at [phone];   GI on board.  ID on board    2. ESRD on HD  nephrology following  HD per renal    3. HTN  Bp stable  c/w metoprolol, amlodipine.    4. Chronic Afib   rate controlled with metoprolol  Hold Eliquis (2/22) as pt has blood tinged drainage, hold It for 48 - 72 hrs as per IR.    5. DVT ppx  SCD.    prognosis guarded.

## 2022-02-24 NOTE — PROGRESS NOTE ADULT - SUBJECTIVE AND OBJECTIVE BOX
Patient is a 82y old  Male who presents with a chief complaint of Acute Cholecystitis, Gallstone Pancreatitis (24 Feb 2022 12:38)      HPI:             81 year old male w/ PMHx of HTN, HLD, ESRD T/TH/SAT, CVA, Atrial Fibrillation on Eliquis, PPM presents to the ED c/o abdominal pain. Pt reports he woke up Thursday morning w/ RUQ abdominal pain associated w/ persistent cough, n/v (NB/NB), gagging, SOB and wretching. Pt describes pain as a persistent "knot" which didn't improve until arrival to the ED. Pt reports he missed HD due to his sxs. Pt denies fever chills, diarrhea or dysuria (still makes some urine).    In ED initial vitals stable, T max 100.2. Labs sig for WBC 16.11, BUN/Cr 52/7.37, Lipase 714, CT A/P showed Prominently distended gallbladder with gallstones and pericholecystic stranding. There is also mildly dense ascites in the right paracolic gutter and pelvis. The appendix is seen, within normal limits. However, the appendiceal tip is partially obscured by adjacent ascites in the right lower quadrant. Patchy opacity of the right lung base may represent atelectasis and/or infiltrate. Tiny right pleural effusion.  RUQ Abdominal US showed Distended gallbladder with nonmobile gallstone in the gallbladder neck   with mild gallbladder wall thickening. Findings suggest a high positive   predictive value for acute cholecystitis.    Pt given IV Zosyn, Surgery consulted (18 Feb 2022 02:50)      INTERVAL HPI/OVERNIGHT EVENTS:  The patient denies melena, hematochezia, hematemesis, nausea, vomiting, abdominal pain, constipation, diarrhea, or change in bowel movements Tolerating solid diet    MEDICATIONS  (STANDING):  amLODIPine   Tablet 10 milliGRAM(s) Oral daily  aspirin  chewable 81 milliGRAM(s) Oral daily  atorvastatin 10 milliGRAM(s) Oral at bedtime  isosorbide   mononitrate ER Tablet (IMDUR) 30 milliGRAM(s) Oral daily  metoprolol tartrate 25 milliGRAM(s) Oral two times a day  piperacillin/tazobactam IVPB... 3.375 Gram(s) IV Intermittent every 12 hours  sodium bicarbonate 650 milliGRAM(s) Oral two times a day    MEDICATIONS  (PRN):  acetaminophen     Tablet .. 650 milliGRAM(s) Oral every 6 hours PRN Temp greater or equal to 38C (100.4F), Mild Pain (1 - 3)  ondansetron Injectable 4 milliGRAM(s) IV Push every 8 hours PRN Nausea and/or Vomiting  traMADol 50 milliGRAM(s) Oral every 12 hours PRN Moderate Pain (4 - 6)      FAMILY HISTORY:  No pertinent family history in first degree relatives        Allergies    No Known Allergies    Intolerances        PMH/PSH:  Hypertension    Hyperlipidemia, unspecified hyperlipidemia type    Renal insufficiency    Cerebrovascular accident (CVA)    No significant past surgical history    History of pacemaker    S/P hip replacement          REVIEW OF SYSTEMS:  CONSTITUTIONAL: No fever, weight loss,   EYES: No eye pain, visual disturbances, or discharge  ENMT:  No difficulty hearing, tinnitus, vertigo; No sinus or throat pain  NECK: No pain or stiffness  BREASTS: No pain, masses, or nipple discharge  RESPIRATORY: No cough, wheezing, chills or hemoptysis; No shortness of breath  CARDIOVASCULAR: No chest pain, palpitations, dizziness, or leg swelling  GASTROINTESTINAL: See above   GENITOURINARY: No dysuria, frequency, hematuria, or incontinence  NEUROLOGICAL: No headaches, memory loss, loss of strength, numbness, or tremors  SKIN: No itching, burning, rashes, or lesions   LYMPH NODES: No enlarged glands  ENDOCRINE: No heat or cold intolerance; No hair loss  MUSCULOSKELETAL: No joint pain or swelling; No muscle, back, or extremity pain  PSYCHIATRIC: No depression, anxiety, mood swings, or difficulty sleeping  HEME/LYMPH: No easy bruising, or bleeding gums  ALLERGY AND IMMUNOLOGIC: No hives or eczema    Vital Signs Last 24 Hrs  T(C): 36.3 (24 Feb 2022 12:55), Max: 37 (23 Feb 2022 17:34)  T(F): 97.4 (24 Feb 2022 12:55), Max: 98.6 (23 Feb 2022 17:34)  HR: 69 (24 Feb 2022 12:55) (68 - 69)  BP: 107/69 (24 Feb 2022 12:55) (97/60 - 110/68)  BP(mean): --  RR: 18 (24 Feb 2022 12:55) (17 - 18)  SpO2: 96% (24 Feb 2022 12:55) (94% - 100%)    PHYSICAL EXAM:  GENERAL: NAD, well-groomed, well-developed  HEAD:  Atraumatic, Normocephalic  EYES: EOMI, PERRLA, conjunctiva and sclera clear  NECK: Supple, No JVD, Normal thyroid  NERVOUS SYSTEM:  Alert & Oriented X3, Good concentration; Motor Strength 5/5 B/L upper and lower extremities;  CHEST/LUNG: Clear to percussion bilaterally; No rales, rhonchi, wheezing, or rubs  HEART: Regular rate and rhythm; No murmurs, rubs, or gallops  ABDOMEN: Soft, Nontender, Nondistended; Bowel sounds present  EXTREMITIES:  2+ Peripheral Pulses, No clubbing, cyanosis, or edema  LYMPH: No lymphadenopathy noted  SKIN: No rashes or lesions    LAB  02-21 @ 07:24  amylase --   lipase 507   02-20 @ 07:50  amylase --   lipase 407                           11.1   6.30  )-----------( 178      ( 24 Feb 2022 07:03 )             35.2       CBC:  02-24 @ 07:03  WBC 6.30   Hgb 11.1   Hct 35.2   Plts 178  .1  02-22 @ 17:20  WBC 5.11   Hgb 11.2   Hct 34.2   Plts 181  .2  02-22 @ 08:18  WBC 5.72   Hgb 11.2   Hct 35.1   Plts 179  .9  02-21 @ 07:24  WBC 6.00   Hgb 11.3   Hct 35.5   Plts 153  .5  02-20 @ 07:50  WBC 7.81   Hgb 11.2   Hct 34.9   Plts 129  .3  02-19 @ 08:17  WBC 12.30   Hgb 11.0   Hct 34.5   Plts 115  .9  02-18 @ 06:28  WBC 15.67   Hgb 11.8   Hct 37.8   Plts 144  .0  02-17 @ 20:58  WBC 16.11   Hgb 12.3   Hct 38.0   Plts 162  .8      Chemistry:  02-24 @ 10:03  Na+ 136  K+ 3.0  Cl- 99  CO2 26  BUN 39  Cr 6.91     02-22 @ 08:18  Na+ 136  K+ 4.2  Cl- 99  CO2 27  BUN 66  Cr 8.66     02-21 @ 07:24  Na+ 136  K+ 3.6  Cl- 98  CO2 26  BUN 59  Cr 7.53     02-20 @ 07:50  Na+ 139  K+ 3.4  Cl- 101  CO2 28  BUN 49  Cr 6.36     02-19 @ 08:17  Na+ 138  K+ 3.7  Cl- 103  CO2 22  BUN 73  Cr 8.59     02-18 @ 06:28  Na+ 138  K+ 4.0  Cl- 104  CO2 26  BUN 60  Cr 7.66     02-17 @ 20:58  Na+ 139  K+ 3.7  Cl- 104  CO2 24  BUN 52  Cr 7.37         Glucose, Serum: 112 mg/dL (02-24 @ 10:03)  Glucose, Serum: 80 mg/dL (02-22 @ 08:18)  Glucose, Serum: 94 mg/dL (02-21 @ 07:24)  Glucose, Serum: 89 mg/dL (02-20 @ 07:50)  Glucose, Serum: 101 mg/dL (02-19 @ 08:17)  Glucose, Serum: 114 mg/dL (02-18 @ 06:28)  Glucose, Serum: 121 mg/dL (02-17 @ 20:58)      24 Feb 2022 10:03    136    |  99     |  39     ----------------------------<  112    3.0     |  26     |  6.91   22 Feb 2022 08:18    136    |  99     |  66     ----------------------------<  80     4.2     |  27     |  8.66   21 Feb 2022 07:24    136    |  98     |  59     ----------------------------<  94     3.6     |  26     |  7.53   20 Feb 2022 07:50    139    |  101    |  49     ----------------------------<  89     3.4     |  28     |  6.36   19 Feb 2022 08:17    138    |  103    |  73     ----------------------------<  101    3.7     |  22     |  8.59   18 Feb 2022 06:28    138    |  104    |  60     ----------------------------<  114    4.0     |  26     |  7.66   17 Feb 2022 20:58    139    |  104    |  52     ----------------------------<  121    3.7     |  24     |  7.37     Ca    8.3        24 Feb 2022 10:03  Ca    8.4        22 Feb 2022 08:18  Ca    8.4        21 Feb 2022 07:24  Ca    8.8        20 Feb 2022 07:50  Ca    8.5        19 Feb 2022 08:17  Ca    8.8        18 Feb 2022 06:28  Ca    8.9        17 Feb 2022 20:58  Phos  5.6       18 Feb 2022 06:28  Mg     2.3       18 Feb 2022 06:28    TPro  7.3    /  Alb  2.3    /  TBili  1.0    /  DBili  x      /  AST  33     /  ALT  18     /  AlkPhos  69     22 Feb 2022 08:18  TPro  7.4    /  Alb  2.4    /  TBili  1.3    /  DBili  x      /  AST  35     /  ALT  15     /  AlkPhos  62     20 Feb 2022 07:50  TPro  7.0    /  Alb  2.5    /  TBili  1.5    /  DBili  0.7    /  AST  20     /  ALT  9      /  AlkPhos  57     19 Feb 2022 08:17  TPro  7.7    /  Alb  3.1    /  TBili  1.3    /  DBili  x      /  AST  16     /  ALT  11     /  AlkPhos  67     18 Feb 2022 06:28  TPro  8.1    /  Alb  3.3    /  TBili  0.9    /  DBili  x      /  AST  21     /  ALT  9      /  AlkPhos  76     17 Feb 2022 20:58      PT/INR - ( 22 Feb 2022 17:20 )   PT: 18.0 sec;   INR: 1.59 ratio                 CAPILLARY BLOOD GLUCOSE              RADIOLOGY & ADDITIONAL TESTS:    Imaging Personally Reviewed:  [ ] YES  [ ] NO    Consultant(s) Notes Reviewed:  [ ] YES  [ ] NO    Care Discussed with Consultants/Other Providers [ ] YES  [ ] NO

## 2022-02-24 NOTE — PROGRESS NOTE ADULT - SUBJECTIVE AND OBJECTIVE BOX
INTERVAL HPI/OVERNIGHT EVENTS: Pt seen and examined at bedside.     82y  Vital Signs Last 24 Hrs  T(C): 36.9 (24 Feb 2022 09:30), Max: 37 (23 Feb 2022 17:34)  T(F): 98.4 (24 Feb 2022 09:30), Max: 98.6 (23 Feb 2022 17:34)  HR: 69 (24 Feb 2022 09:30) (68 - 69)  BP: 101/61 (24 Feb 2022 09:30) (97/60 - 110/68)  BP(mean): --  RR: 18 (24 Feb 2022 09:30) (17 - 18)  SpO2: 100% (24 Feb 2022 09:30) (94% - 100%)  I&O's Summary    23 Feb 2022 07:01  -  24 Feb 2022 07:00  --------------------------------------------------------  IN: 575 mL / OUT: 430 mL / NET: 145 mL      MEDICATIONS  (STANDING):  amLODIPine   Tablet 10 milliGRAM(s) Oral daily  aspirin  chewable 81 milliGRAM(s) Oral daily  atorvastatin 10 milliGRAM(s) Oral at bedtime  isosorbide   mononitrate ER Tablet (IMDUR) 30 milliGRAM(s) Oral daily  metoprolol tartrate 25 milliGRAM(s) Oral two times a day  piperacillin/tazobactam IVPB... 3.375 Gram(s) IV Intermittent every 12 hours  sodium bicarbonate 650 milliGRAM(s) Oral two times a day    MEDICATIONS  (PRN):  acetaminophen     Tablet .. 650 milliGRAM(s) Oral every 6 hours PRN Temp greater or equal to 38C (100.4F), Mild Pain (1 - 3)  ondansetron Injectable 4 milliGRAM(s) IV Push every 8 hours PRN Nausea and/or Vomiting  traMADol 50 milliGRAM(s) Oral every 12 hours PRN Moderate Pain (4 - 6)    LABS:    trop                        11.1   6.30  )-----------( 178      ( 24 Feb 2022 07:03 )             35.2     02-24    136  |  99  |  39<H>  ----------------------------<  112<H>  3.0<L>   |  26  |  6.91<H>    Ca    8.3<L>      24 Feb 2022 10:03      PT/INR - ( 22 Feb 2022 17:20 )   PT: 18.0 sec;   INR: 1.59 ratio             CAPILLARY BLOOD GLUCOSE      RADIOLOGY & ADDITIONAL TESTS:    Imaging Personally Reviewed:  [ ] YES  [ ] NO    Consultant(s) Notes Reviewed:  [x ] YES  [ ] NO    PHYSICAL EXAM:  GENERAL: NAD, well-groomed, well-developed  HEAD:  Atraumatic, Normocephalic  EYES: EOMI, PERRLA, conjunctiva and sclera clear  ENMT: Moist mucous membranes  NECK: Supple  NERVOUS SYSTEM:  Alert & Oriented X3, normal speech  CHEST/LUNG: Clear to auscultation bilaterally; No rales, rhonchi, wheezing, or rubs  HEART:S1, s2  ABDOMEN: Soft, mild RUQ tenderness, Bowel sounds present, + cholecystostomy tube w/ blood stained discharge  EXTREMITIES: no cyanosis, or edema      A & P:        Care Discussed with Consultants/Other Providers [ x] YES  [ ] NO

## 2022-02-24 NOTE — DISCHARGE NOTE NURSING/CASE MANAGEMENT/SOCIAL WORK - NSDPDISTO_GEN_ALL_CORE
Patient is AXOX4. Denied chest pain and shortness of breath. Vital signs remained stable. Pain was assessed and remained at an acceptable level with interventions. Incentive spirometer encouraged. Patient ambulated in hallway. Patient safety maintained through out shift. Drain is intact and patent. Patient demonstrated how to empty. IVs are being removed and patient is being discharged.

## 2022-02-24 NOTE — DISCHARGE NOTE NURSING/CASE MANAGEMENT/SOCIAL WORK - NSDCPEFALRISK_GEN_ALL_CORE
For information on Fall & Injury Prevention, visit: [email or web address] OR  [email or web address] OR  https://www.cdc.gov/steadi/patient.html

## 2022-02-24 NOTE — DISCHARGE NOTE NURSING/CASE MANAGEMENT/SOCIAL WORK - PATIENT PORTAL LINK FT
You can access the FollowMyHealth Patient Portal offered by [facility] by registering at the following website: [email or web address]. By joining [facility] [facility]’s FollowMyHealth portal, you will also be able to view your health information using other applications (apps) compatible with our system.

## 2022-02-24 NOTE — PROGRESS NOTE ADULT - SUBJECTIVE AND OBJECTIVE BOX
[facility] NEPHROLOGY SERVICES, [facility]  NEPHROLOGY AND HYPERTENSION  [address]  [address]  [place] [address]  [phone]    MD BLANAC SCHMIDT, MD MARY LOU DOVER, MD LORRAINE TUTTLE, MD CHRISTIAN BELL, MD TAMY WELDON, MD KARENA RODRIGUEZ MD          Patient events noted      MEDICATIONS  (STANDING):  amLODIPine   Tablet 10 milliGRAM(s) Oral daily  aspirin  chewable 81 milliGRAM(s) Oral daily  atorvastatin 10 milliGRAM(s) Oral at bedtime  isosorbide   mononitrate ER Tablet (IMDUR) 30 milliGRAM(s) Oral daily  metoprolol tartrate 25 milliGRAM(s) Oral two times a day  sodium bicarbonate 650 milliGRAM(s) Oral two times a day    MEDICATIONS  (PRN):  acetaminophen     Tablet .. 650 milliGRAM(s) Oral every 6 hours PRN Temp greater or equal to 38C (100.4F), Mild Pain (1 - 3)  ondansetron Injectable 4 milliGRAM(s) IV Push every 8 hours PRN Nausea and/or Vomiting  traMADol 50 milliGRAM(s) Oral every 12 hours PRN Moderate Pain (4 - 6)      02-23-22 @ 07:01  -  02-24-22 @ 07:00  --------------------------------------------------------  IN: 575 mL / OUT: 430 mL / NET: 145 mL    02-24-22 @ 07:01  -  02-24-22 @ 18:32  --------------------------------------------------------  IN: 0 mL / OUT: 1100 mL / NET: -1100 mL      PHYSICAL EXAM:      T(C): 36.7 (02-24-22 @ 16:52), Max: 36.9 (02-24-22 @ 00:18)  HR: 66 (02-24-22 @ 16:52) (66 - 69)  BP: 99/65 (02-24-22 @ 16:52) (97/60 - 110/68)  RR: 17 (02-24-22 @ 16:52) (17 - 18)  SpO2: 97% (02-24-22 @ 16:52) (94% - 100%)  Wt(kg): --  Lungs clear  Heart S1S2  Abd soft NT ND  Extremities:   tr edema                                    11.1   6.30  )-----------( 178      ( 24 Feb 2022 07:03 )             35.2     02-24    136  |  99  |  39<H>  ----------------------------<  112<H>  3.0<L>   |  26  |  6.91<H>    Ca    8.3<L>      24 Feb 2022 10:03          Creatinine Trend: 6.91<--, 8.66<--, 7.53<--, 6.36<--, 8.59<--, 7.66<--      Assessment   ESRD, maintenance     Plan:  Hd for today  UF as tolerated   Will follow     Abbe Jerry MD

## 2022-02-24 NOTE — PROGRESS NOTE ADULT - SUBJECTIVE AND OBJECTIVE BOX
BRITNEY ROSEN  MRN-[id number]    Follow Up:  acute cholecystitis     Interval History: the pt was seen and examined earlier, no distress, no new complains, feeling better overall. The pt is afebrile, on RA, no WBC.     PAST MEDICAL & SURGICAL HISTORY:  Hypertension    Hyperlipidemia, unspecified hyperlipidemia type    Renal insufficiency    Cerebrovascular accident (CVA)    History of pacemaker    S/P hip replacement  BL 2010, 2011        ROS:    [ ] Unobtainable because:  [ x] All other systems negative    Constitutional: no fever, no chills  Head: no trauma  Eyes: no vision changes, no eye pain  ENT:  no sore throat, no rhinorrhea  Cardiovascular:  no chest pain, no palpitation  Respiratory:  no SOB, no cough  GI:  no abd pain, no vomiting, no diarrhea  urinary: no dysuria, no hematuria, no flank pain  musculoskeletal:  no joint pain, no joint swelling  skin:  no rash  neurology:  no headache, no seizure, no change in mental status  psych: no anxiety, no depression         Allergies  No Known Allergies        ANTIMICROBIALS:  piperacillin/tazobactam IVPB... 3.375 every 12 hours      OTHER MEDS:  acetaminophen     Tablet .. 650 milliGRAM(s) Oral every 6 hours PRN  amLODIPine   Tablet 10 milliGRAM(s) Oral daily  aspirin  chewable 81 milliGRAM(s) Oral daily  atorvastatin 10 milliGRAM(s) Oral at bedtime  isosorbide   mononitrate ER Tablet (IMDUR) 30 milliGRAM(s) Oral daily  metoprolol tartrate 25 milliGRAM(s) Oral two times a day  ondansetron Injectable 4 milliGRAM(s) IV Push every 8 hours PRN  sodium bicarbonate 650 milliGRAM(s) Oral two times a day  traMADol 50 milliGRAM(s) Oral every 12 hours PRN      Vital Signs Last 24 Hrs  T(C): 36.3 (24 Feb 2022 12:55), Max: 37 (23 Feb 2022 17:34)  T(F): 97.4 (24 Feb 2022 12:55), Max: 98.6 (23 Feb 2022 17:34)  HR: 69 (24 Feb 2022 12:55) (68 - 69)  BP: 107/69 (24 Feb 2022 12:55) (97/60 - 110/68)  BP(mean): --  RR: 18 (24 Feb 2022 12:55) (17 - 18)  SpO2: 96% (24 Feb 2022 12:55) (94% - 100%)    Physical Exam:  Constitutional: non-toxic, no distress  HEAD/EYES: anicteric, no conjunctival injection  ENT:  supple, no thrush  Cardiovascular:   normal S1, S2, 3/6 +murmur, no edema  Respiratory:  clear BS bilaterally, no wheezes, no rales  GI:  soft, there is a perc cholecystotomy tube present with blood in the bag, not tender around insertion site, normal bowel sounds  :  no chino, no CVA tenderness  Musculoskeletal:  no synovitis, normal ROM, LUE AVF with palpable thrill  Neurologic: awake and alert, normal strength, no focal findings  Skin:  no rash, no erythema, no phlebitis  Heme/Onc: no lymphadenopathy   Psychiatric:  awake, alert, appropriate mood    WBC Count: 6.30 K/uL (02-24 @ 07:03)  WBC Count: 5.11 K/uL (02-22 @ 17:20)  WBC Count: 5.72 K/uL (02-22 @ 08:18)  WBC Count: 6.00 K/uL (02-21 @ 07:24)  WBC Count: 7.81 K/uL (02-20 @ 07:50)  WBC Count: 12.30 K/uL (02-19 @ 08:17)  WBC Count: 15.67 K/uL (02-18 @ 06:28)                            11.1   6.30  )-----------( 178      ( 24 Feb 2022 07:03 )             35.2       02-24    136  |  99  |  39<H>  ----------------------------<  112<H>  3.0<L>   |  26  |  6.91<H>    Ca    8.3<L>      24 Feb 2022 10:03            Creatinine Trend: 6.91<--, 8.66<--, 7.53<--, 6.36<--, 8.59<--, 7.66<--      MICROBIOLOGY:  v  .Body Fluid Bile  02-18-22   Moderate Alpha hemolytic strep  --  Alpha hemolytic strep      RADIOLOGY:     BRITNEY ROSEN  MRN-[id number]    Follow Up:  acute cholecystitis     Interval History: the pt was seen and examined earlier, no distress, no new complains, feeling better overall. The pt is afebrile, on RA, no WBC. wants to go home     PAST MEDICAL & SURGICAL HISTORY:  Hypertension    Hyperlipidemia, unspecified hyperlipidemia type    Renal insufficiency    Cerebrovascular accident (CVA)    History of pacemaker    S/P hip replacement  BL 2010, 2011        ROS:    [ ] Unobtainable because:  [ x] All other systems negative    Constitutional: no fever, no chills  Head: no trauma  Eyes: no vision changes, no eye pain  ENT:  no sore throat, no rhinorrhea  Cardiovascular:  no chest pain, no palpitation  Respiratory:  no SOB, no cough  GI:  no abd pain, no vomiting, no diarrhea  urinary: no dysuria, no hematuria, no flank pain  musculoskeletal:  no joint pain, no joint swelling  skin:  no rash  neurology:  no headache, no seizure, no change in mental status  psych: no anxiety, no depression         Allergies  No Known Allergies        ANTIMICROBIALS:  piperacillin/tazobactam IVPB... 3.375 every 12 hours      OTHER MEDS:  acetaminophen     Tablet .. 650 milliGRAM(s) Oral every 6 hours PRN  amLODIPine   Tablet 10 milliGRAM(s) Oral daily  aspirin  chewable 81 milliGRAM(s) Oral daily  atorvastatin 10 milliGRAM(s) Oral at bedtime  isosorbide   mononitrate ER Tablet (IMDUR) 30 milliGRAM(s) Oral daily  metoprolol tartrate 25 milliGRAM(s) Oral two times a day  ondansetron Injectable 4 milliGRAM(s) IV Push every 8 hours PRN  sodium bicarbonate 650 milliGRAM(s) Oral two times a day  traMADol 50 milliGRAM(s) Oral every 12 hours PRN      Vital Signs Last 24 Hrs  T(C): 36.3 (24 Feb 2022 12:55), Max: 37 (23 Feb 2022 17:34)  T(F): 97.4 (24 Feb 2022 12:55), Max: 98.6 (23 Feb 2022 17:34)  HR: 69 (24 Feb 2022 12:55) (68 - 69)  BP: 107/69 (24 Feb 2022 12:55) (97/60 - 110/68)  BP(mean): --  RR: 18 (24 Feb 2022 12:55) (17 - 18)  SpO2: 96% (24 Feb 2022 12:55) (94% - 100%)    Physical Exam:  Constitutional: non-toxic, no distress  HEAD/EYES: anicteric, no conjunctival injection  ENT:  supple, no thrush  Cardiovascular:   normal S1, S2, 3/6 +murmur, no edema  Respiratory:  clear BS bilaterally, no wheezes, no rales  GI:  soft, there is a perc cholecystotomy tube present with blood in the bag, not tender around insertion site, normal bowel sounds  :  no chino, no CVA tenderness  Musculoskeletal:  no synovitis, normal ROM, LUE AVF with palpable thrill  Neurologic: awake and alert, normal strength, no focal findings  Skin:  no rash, no erythema, no phlebitis  Psychiatric:  awake, alert, appropriate mood    WBC Count: 6.30 K/uL (02-24 @ 07:03)  WBC Count: 5.11 K/uL (02-22 @ 17:20)  WBC Count: 5.72 K/uL (02-22 @ 08:18)  WBC Count: 6.00 K/uL (02-21 @ 07:24)  WBC Count: 7.81 K/uL (02-20 @ 07:50)  WBC Count: 12.30 K/uL (02-19 @ 08:17)  WBC Count: 15.67 K/uL (02-18 @ 06:28)                            11.1   6.30  )-----------( 178      ( 24 Feb 2022 07:03 )             35.2       02-24    136  |  99  |  39<H>  ----------------------------<  112<H>  3.0<L>   |  26  |  6.91<H>    Ca    8.3<L>      24 Feb 2022 10:03            Creatinine Trend: 6.91<--, 8.66<--, 7.53<--, 6.36<--, 8.59<--, 7.66<--      MICROBIOLOGY:  v  .Body Fluid Bile  02-18-22   Moderate Alpha hemolytic strep  --  Alpha hemolytic strep      RADIOLOGY:

## 2022-02-24 NOTE — PROGRESS NOTE ADULT - ASSESSMENT
81 year old male w/ PMHx of HTN, HLD, ESRD T/TH/SAT, CVA, Atrial Fibrillation on Eliquis, PPM presents to the ED c/o abdominal pain found to have gallstone pancreatitis and acute cholecystitis .  T max 100.2.   Labs sig for WBC 16.11, BUN/Cr 52/7.37, Lipase 714  Radiology: CT A/P showed Prominently distended gallbladder with gallstones and pericholecystic stranding  RUQ Abdominal US showed Distended gallbladder with nonmobile gallstone in the gallbladder neck   with mild gallbladder wall thickening. Findings suggest a high positive   s/p cholecystostomy tube placement by IR  cultures from aspirate growing alpha strep    2/22: no fevers, on RA, WBC normalized, LFTs ok, body fluid culture with alpha hemolytic streptococcus, percutaneous drain with bloody fluid in the bag, no longer tender with palpation of surrounding tissues at the drain insertion site, Zosyn IV continued.   Attending addendum:  Acute cholecystitis   Leukocytosis   ESRD    Plan:   continue (Zosyn) Piperacillin/tazobactam 3.375 grams every 12 hours   follow LFTs  pain control   trend wbc   hemodialysis per renal   continue antibiotics for 2 more days for a total of 7 days from drainage     2/23: pt is doing well, no fevers, denies any pain, on RA, no new lab work today, Zosyn IV continued, will need 7 days of abx from the day of the drain placement. KUB negative.   Attending addendum:  agree with above  one more day of antibiotics   drain management per IR and surgery   ensure patient follows up with surgery to schedule elective surgical removal of gallbladder  Patient is functional, drives to hemodialysis and grocery stores  he is doing much better, d/c planning     2/24: doing well, no fevers, on RA, no WBC, today is the last day of IV Zosyn, ok to discharge home off abx with surgical follow up after today's doses.     Acute cholecystitis   Leukocytosis   ESRD    Plan:   continue (Zosyn) Piperacillin/tazobactam 3.375 grams every 12 hours - stop after today's doses   follow LFTs  trend wbc   hemodialysis per renal   drain management per IR and surgery   ensure patient follows up with surgery to schedule elective surgical removal of gallbladder      Msg sent to Dr. Garcia       81 year old male w/ PMHx of HTN, HLD, ESRD T/TH/SAT, CVA, Atrial Fibrillation on Eliquis, PPM presents to the ED c/o abdominal pain found to have gallstone pancreatitis and acute cholecystitis .  T max 100.2.   Labs sig for WBC 16.11, BUN/Cr 52/7.37, Lipase 714  Radiology: CT A/P showed Prominently distended gallbladder with gallstones and pericholecystic stranding  RUQ Abdominal US showed Distended gallbladder with nonmobile gallstone in the gallbladder neck   with mild gallbladder wall thickening. Findings suggest a high positive   s/p cholecystostomy tube placement by IR  cultures from aspirate growing alpha strep    2/22: no fevers, on RA, WBC normalized, LFTs ok, body fluid culture with alpha hemolytic streptococcus, percutaneous drain with bloody fluid in the bag, no longer tender with palpation of surrounding tissues at the drain insertion site, Zosyn IV continued.   Attending addendum:  Acute cholecystitis   Leukocytosis   ESRD    Plan:   continue (Zosyn) Piperacillin/tazobactam 3.375 grams every 12 hours   follow LFTs  pain control   trend wbc   hemodialysis per renal   continue antibiotics for 2 more days for a total of 7 days from drainage     2/23: pt is doing well, no fevers, denies any pain, on RA, no new lab work today, Zosyn IV continued, will need 7 days of abx from the day of the drain placement. KUB negative.   Attending addendum:  agree with above  one more day of antibiotics   drain management per IR and surgery   ensure patient follows up with surgery to schedule elective surgical removal of gallbladder  Patient is functional, drives to hemodialysis and grocery stores  he is doing much better, d/c planning     2/24: doing well, no fevers, on RA, no WBC, today is the last day of IV Zosyn, ok to discharge home off abx with surgical follow up after today's doses and once confirmed not bleeding into drain due to his blood thinner     Acute cholecystitis   Leukocytosis   ESRD    Plan:   continue (Zosyn) Piperacillin/tazobactam 3.375 grams every 12 hours - stop after today's doses   follow LFTs  trend wbc   hemodialysis per renal   drain management per IR and surgery   ensure patient follows up with surgery to schedule elective surgical removal of gallbladder      Msg sent to Dr. Garcia

## 2022-02-25 NOTE — PROGRESS NOTE ADULT - PROVIDER SPECIALTY LIST ADULT
Intervent Radiology
Nephrology
Nephrology
Hospitalist
Infectious Disease
Nephrology
Gastroenterology
Gastroenterology
Infectious Disease
Surgery
Gastroenterology
Hospitalist
Infectious Disease
Gastroenterology
Hospitalist

## 2022-02-25 NOTE — PROGRESS NOTE ADULT - NUTRITIONAL ASSESSMENT
This patient has been assessed with a concern for Malnutrition and has been determined to have a diagnosis/diagnoses of Moderate protein-calorie malnutrition.    This patient is being managed with:   Diet Regular-  Entered: Feb 23 2022  2:32PM    The following pending diet order is being considered for treatment of Moderate protein-calorie malnutrition:  Diet Renal Restrictions-  For patients receiving Renal Replacement - No Protein Restr No Conc K No Conc Phos Low Sodium  Entered: Feb 23 2022  2:57PM  
This patient has been assessed with a concern for Malnutrition and has been determined to have a diagnosis/diagnoses of Moderate protein-calorie malnutrition.    This patient is being managed with:   Diet Renal Restrictions-  For patients receiving Renal Replacement - No Protein Restr No Conc K No Conc Phos Low Sodium  Entered: Feb 23 2022  2:57PM

## 2022-02-25 NOTE — PROGRESS NOTE ADULT - PROBLEM SELECTOR PROBLEM 1
Renal failure, unspecified chronicity

## 2022-02-25 NOTE — PROGRESS NOTE ADULT - REASON FOR ADMISSION
Acute Cholecystitis, Gallstone Pancreatitis

## 2022-02-25 NOTE — DISCHARGE NOTE PROVIDER - CARE PROVIDER_API CALL
Eldon Khan  make an appointment with IR by calling the IR booking office at [phone]; recommend IR follow in 4-6wks for tube evaluation.  Phone: [phone]  Fax: (   )    -  Follow Up Time: 1 month    Evan Javier  [address] [facility] [address],   [address],  [place] [address]  Phone: [phone]  Fax: (   )    -  Follow Up Time: 2 weeks

## 2022-02-25 NOTE — PROGRESS NOTE ADULT - PROBLEM SELECTOR PLAN 4
eliquis for dvt ppx
DVTp; SCD, early ambulation
eliquis for dvt ppx
DVTp; SCD, early ambulation
eliquis for dvt ppx
eliquis for dvt ppx

## 2022-02-25 NOTE — PROGRESS NOTE ADULT - PROBLEM SELECTOR PLAN 1
ESRD requiring HD   Nephrology on board.   s/p permacath placement.   Discharge planning.
ESRD- HD per renal
ESRD- HD per renal
ESRD requiring HD   Nephrology on board.   s/p permacath placement.   Discharge planning.
ESRD- HD per renal

## 2022-02-25 NOTE — DISCHARGE NOTE PROVIDER - NSDCMRMEDTOKEN_GEN_ALL_CORE_FT
acetaminophen 500 mg oral tablet: 2 tab(s) orally every 6 hours, As Needed  amLODIPine 10 mg oral tablet: 1 tab(s) orally once a day  apixaban 2.5 mg oral tablet: 1 tab(s) orally 2 times a day  Please take it starting 2/26/2022.    aspirin 325 mg oral tablet: 1 tab(s) orally once a day  atorvastatin 10 mg oral tablet: 1 tab(s) orally once a day (at bedtime)  isosorbide mononitrate 30 mg oral tablet, extended release: 1 tab(s) orally once a day  metoprolol tartrate 25 mg oral tablet: 1 tab(s) orally 2 times a day  sodium bicarbonate 650 mg oral tablet: 1 tab(s) orally 2 times a day

## 2022-02-25 NOTE — PROGRESS NOTE ADULT - ASSESSMENT
82 y/o M with pmh of HTN, HLD, ESRD on HD (T.Th/S), hx CVA, AF on eliquis at home, s/p PPM p/w c/o RUQ abd pain- also w/ cough, retching, dry heaves, found to have acute cholecystitis and Gall stone pancreatitis    1. Acute Cholecystitis sec to GS/ Gall stone Pancreatitis   seen by surgery who recommend IR eval  s/p IR percutaneous cholecystostomy 2/18/22, + maroon colored drainage.  held Eliquis since (2/22).  was evaluated by IR and recommended to hold Eliquis for 48- 72hrs.  Cx- Alpha H. Strep.  s/p Zosyn.  LFts normal, Lipase slightly elevated  No surgical intervention at this time, please follow up with Sx as out patient.  recommend IR follow in 4-6wks for tube evaluation. make an appointment with IR by calling the IR booking office at [phone];   GI on board.  ID on board    2. ESRD on HD  nephrology following  HD per renal    3. HTN  Bp stable  c/w metoprolol, amlodipine.    4. Chronic Afib   rate controlled with metoprolol  Hold Eliquis (2/22) as pt has blood tinged drainage, hold It for 48 - 72 hrs as per IR.    5. DVT ppx  SCD.    prognosis guarded.   Pending IR re eval.

## 2022-02-25 NOTE — DISCHARGE NOTE PROVIDER - DETAILS OF MALNUTRITION DIAGNOSIS/DIAGNOSES
This patient has been assessed with a concern for Malnutrition and was treated during this hospitalization for the following Nutrition diagnosis/diagnoses:     -  02/23/2022: Moderate protein-calorie malnutrition

## 2022-02-25 NOTE — DISCHARGE NOTE PROVIDER - HOSPITAL COURSE
[FreeTextEntry1] : 51 year old M  presents for a diabetic foot evaluation. Mr. DA SILVA denies any tingling burning in his feet. Last A1c was 5.4%. Reports sharp shooting lower back pain that travels down his legs. Patient also reports leg fatigue and cramping? of b/l lower extremities after walking several blocks\par \par 
  82 y/o M with pmh of HTN, HLD, ESRD on HD (T.Th/S), hx CVA, AF on eliquis at home, s/p PPM p/w c/o RUQ abd pain- also w/ cough, retching, dry heaves, found to have acute cholecystitis and Gall stone pancreatitis    1. Acute Cholecystitis sec to GS/ Gall stone Pancreatitis   s/p IR percutaneous cholecystostomy 2/18/22, + maroon colored drainage.  was evaluated by IR and recommended to hold Eliquis for 48- 72hrs.  Cx- Alpha H. Strep.  s/p Zosyn.  LFts normal, Lipase slightly elevated  recommend IR follow in 4-6wks for tube evaluation. make an appointment with IR by calling the IR booking office at [phone];   Please follow up with surgery as out patient for possible Cholecystectomy.    2. ESRD on HD  HD per renal    3. HTN  Bp stable  c/w metoprolol, amlodipine.    4. Chronic Afib   rate controlled with metoprolol  Restart Eliquis tomorrow (2/26/2022)

## 2022-02-25 NOTE — DISCHARGE NOTE PROVIDER - PROVIDER TOKENS
FREE:[LAST:[Khan],FIRST:[Eldon],PHONE:[phone]],FAX:[(   )    -],ADDRESS:[make an appointment with IR by calling the IR booking office at [phone]; recommend IR follow in 4-6wks for tube evaluation.],FOLLOWUP:[1 month]],FREE:[LAST:[Concepcion],FIRST:[Evan],PHONE:[phone]],FAX:[(   )    -],ADDRESS:[[address] [place] [address],   [address],  [place] [address]],FOLLOWUP:[2 weeks]]

## 2022-02-25 NOTE — DISCHARGE NOTE PROVIDER - NSDCCPCAREPLAN_GEN_ALL_CORE_FT
PRINCIPAL DISCHARGE DIAGNOSIS  Diagnosis: Cholecystitis  Assessment and Plan of Treatment: s/p IR percutaneous cholecystostomy 2/18/22.  Cx- Alpha H. Strep.  s/p Zosyn.  LFts normal, Lipase slightly elevated  recommend IR follow in 4-6wks for tube evaluation. make an appointment with IR by calling the IR booking office at [phone];   Please follow up with surgery as out patient for possible Cholecystectomy.      SECONDARY DISCHARGE DIAGNOSES  Diagnosis: ESRD on dialysis  Assessment and Plan of Treatment: On HD.    Diagnosis: Chronic atrial fibrillation  Assessment and Plan of Treatment: Restart Eliquis 2/26/2022.    Diagnosis: HTN (hypertension)  Assessment and Plan of Treatment:

## 2022-02-25 NOTE — PROGRESS NOTE ADULT - SUBJECTIVE AND OBJECTIVE BOX
INTERVAL HPI/OVERNIGHT EVENTS: Pt seen and examined at bedside. Denies any complaints. + maroon colored drainage.  82y  Vital Signs Last 24 Hrs  T(C): 36.4 (25 Feb 2022 04:42), Max: 36.7 (24 Feb 2022 16:52)  T(F): 97.6 (25 Feb 2022 04:42), Max: 98.1 (24 Feb 2022 16:52)  HR: 70 (25 Feb 2022 04:42) (66 - 70)  BP: 100/57 (25 Feb 2022 04:42) (99/65 - 132/67)  BP(mean): --  RR: 18 (25 Feb 2022 04:42) (17 - 18)  SpO2: 96% (25 Feb 2022 04:42) (96% - 97%)  I&O's Summary    24 Feb 2022 07:01  -  25 Feb 2022 07:00  --------------------------------------------------------  IN: 520 mL / OUT: 1520 mL / NET: -1000 mL    25 Feb 2022 07:01  -  25 Feb 2022 11:16  --------------------------------------------------------  IN: 0 mL / OUT: 150 mL / NET: -150 mL      MEDICATIONS  (STANDING):  amLODIPine   Tablet 10 milliGRAM(s) Oral daily  aspirin  chewable 81 milliGRAM(s) Oral daily  atorvastatin 10 milliGRAM(s) Oral at bedtime  isosorbide   mononitrate ER Tablet (IMDUR) 30 milliGRAM(s) Oral daily  metoprolol tartrate 25 milliGRAM(s) Oral two times a day  sodium bicarbonate 650 milliGRAM(s) Oral two times a day    MEDICATIONS  (PRN):  acetaminophen     Tablet .. 650 milliGRAM(s) Oral every 6 hours PRN Temp greater or equal to 38C (100.4F), Mild Pain (1 - 3)  ondansetron Injectable 4 milliGRAM(s) IV Push every 8 hours PRN Nausea and/or Vomiting  traMADol 50 milliGRAM(s) Oral every 12 hours PRN Moderate Pain (4 - 6)    LABS:    trop                        11.1   6.30  )-----------( 178      ( 24 Feb 2022 07:03 )             35.2     02-24    136  |  99  |  39<H>  ----------------------------<  112<H>  3.0<L>   |  26  |  6.91<H>    Ca    8.3<L>      24 Feb 2022 10:03          CAPILLARY BLOOD GLUCOSE      POCT Blood Glucose.: 84 mg/dL (25 Feb 2022 08:08)          REVIEW OF SYSTEMS:  CONSTITUTIONAL: No fever, weight loss, or fatigue  RESPIRATORY: No cough, wheezing, chills or hemoptysis; No shortness of breath  CARDIOVASCULAR: No chest pain, palpitations, dizziness, or leg swelling  GASTROINTESTINAL: No abdominal or epigastric pain. No nausea, vomiting, or hematemesis; No diarrhea or constipation. No melena or hematochezia.  GENITOURINARY: No dysuria, frequency, hematuria, or incontinence  NEUROLOGICAL: No headaches, memory loss, loss of strength, numbness, or tremors  MUSCULOSKELETAL: No joint pain or swelling; No muscle, back, or extremity pain      RADIOLOGY & ADDITIONAL TESTS:    Imaging Personally Reviewed:  [ ] YES  [ ] NO    Consultant(s) Notes Reviewed:  [x ] YES  [ ] NO    PHYSICAL EXAM:  GENERAL: NAD, well-groomed, well-developed  HEAD:  Atraumatic, Normocephalic  EYES: EOMI, PERRLA, conjunctiva and sclera clear  ENMT: Moist mucous membranes  NECK: Supple  NERVOUS SYSTEM:  Alert & Oriented X3, normal speech  CHEST/LUNG: Clear to auscultation bilaterally; No rales, rhonchi, wheezing, or rubs  HEART:S1, s2  ABDOMEN: Soft, mild RUQ tenderness, Bowel sounds present, + cholecystostomy tube w/ blood stained discharge  EXTREMITIES: no cyanosis, or edema      A & P:        Care Discussed with Consultants/Other Providers [ x] YES  [ ] NO

## 2022-03-08 NOTE — ED ADULT NURSE NOTE - NSIMPLEMENTINTERV_GEN_ALL_ED
Implemented All Fall Risk Interventions:  [place] to call system. Call bell, personal items and telephone within reach. Instruct patient to call for assistance. Room bathroom lighting operational. Non-slip footwear when patient is off stretcher. Physically safe environment: no spills, clutter or unnecessary equipment. Stretcher in lowest position, wheels locked, appropriate side rails in place. Provide visual cue, wrist band, yellow gown, etc. Monitor gait and stability. Monitor for mental status changes and reorient to person, place, and time. Review medications for side effects contributing to fall risk. Reinforce activity limits and safety measures with patient and family.

## 2022-03-08 NOTE — H&P ADULT - NSHPPHYSICALEXAM_GEN_ALL_CORE
Vital Signs Last 24 Hrs  T(C): 37.3 (11-07-21 @ 07:15)  T(F): 99.1 (11-07-21 @ 07:15), Max: 99.1 (11-07-21 @ 07:15)  HR: 122 (11-07-21 @ 07:15) (122 - 122)  BP: 93/57 (11-07-21 @ 07:15)  BP(mean): --  RR: 20 (11-07-21 @ 07:15) (20 - 20)  SpO2: 96% (11-07-21 @ 07:15) (96% - 96%)  Wt(kg): --    Constitutional: arousable, lying on stretcher  EYES: pupils 3 mm, sluggishly reactive to light  Neck: no thyromegaly, no hematoma  Respiratory: on ventilator, good air movement  Cardiovascular: S1 and S2, regular rate and rhythm  Gastrointestinal: Biliary drain on right side of abdomen, mildly distended abdomen  Neurological: gag reflex intact, other CN exams ltd due to AMS, little tone in extremities  Musculoskeletal: no joint swelling, not moving extremities  Skin: No rashes, no lacerations

## 2022-03-08 NOTE — PATIENT PROFILE ADULT - FALL HARM RISK - HARM RISK INTERVENTIONS

## 2022-03-08 NOTE — CHART NOTE - NSCHARTNOTEFT_GEN_A_CORE
CT reviewed. Lola tube is in good position, similar in appearance compared to initial placement fluoroscopy. Will asses at bedside.

## 2022-03-08 NOTE — ED ADULT NURSE NOTE - TEMPLATE
Lukas Herrera > 04/02/21 1013
Cardiac

## 2022-03-08 NOTE — H&P ADULT - HISTORY OF PRESENT ILLNESS
82 year old male with h/o HTN, HLD, CVA, afib on eliquis s/p ppm, ESRD ( on dialysisi Tues/Thurs/Sat, pt's last dialysis was on Thurs, pt missed his Sat session ) and cholecystitis/gallstone pancreatitis s/p RUQ perc denae drainage presents today biba from dialysis where pt was found unresponsive.  Pt was to get dialysis today, but became unresponsive. Per EMS, pt was found in v tach.Patient was given a total of one shock in the dialysis center on arrival. Pt was also intubated in the field. Patient continued to be in V tach, They administered three more shocks at 200 Joules. They also gave a total of three bicarb, amiodarone 300mg, 3 epi and one amp of calcium chloride with return of pulses. Patient was noted to be down relatively for 10 minutes. Pt was brought in with good pulses, immediately assessed, tube placement verified with chest auscultation and portable cxr, pt hypotensive given IVf and placed on the ventilator 82 year old male with h/o HTN, HLD, CVA, afib on eliquis s/p ppm, ESRD ( on dialysisi Tues/Thurs/Sat, pt's last dialysis was on Thurs, pt missed his Sat session ) and cholecystitis/gallstone pancreatitis s/p RUQ perc denae drainage presents today biba from dialysis where pt was found unresponsive. Per EMS, pt was found in v tach.Patient was given a total of one shock in the dialysis center on arrival. Pt was also intubated in the field. Patient continued to be in V tach, They administered three more shocks at 200 Joules. They also gave a total of three bicarb, amiodarone 300mg, 3 epi and one amp of calcium chloride with return of pulses. Patient was noted to be down relatively for 10 minutes. Pt was brought in with good pulses, immediately assessed, tube placement verified with chest auscultation and portable cxr, pt hypotensive given IVf and placed on the ventilator

## 2022-03-08 NOTE — H&P ADULT - NSHPLABSRESULTS_GEN_ALL_CORE
Complete Blood Count + Automated Diff (03.08.22 @ 11:10)   WBC Count: 6.01 K/uL   RBC Count: 3.20 M/uL   Hemoglobin: 10.4 g/dL   Hematocrit: 33.9 %   Mean Cell Volume: 105.9 fl   Mean Cell Hemoglobin: 32.5 pg   Mean Cell Hemoglobin Conc: 30.7 g/dL   Red Cell Distrib Width: 13.8: Dimorphic population, unable to determine RDW. %   Platelet Count - Automated: 231 K/uL   Auto Neutrophil #: 3.31 K/uL   Auto Lymphocyte #: 2.40 K/uL   Auto Monocyte #: 0.06 K/uL   Auto Eosinophil #: 0.06 K/uL   Auto Basophil #: 0.00 K/uL   Auto Neutrophil %: 52.0: Differential percentages must be correlated with absolute numbers for   clinical significance. %   Auto Lymphocyte %: 40.0 %   Auto Monocyte %: 1.0 %   Auto Eosinophil %: 1.0 %   Auto Basophil %: 0.0 %   Nucleated RBC: N/A: Manual NRBC performed. /100 WBCs    Comprehensive Metabolic Panel (03.08.22 @ 11:10)   Sodium, Serum: 143 mmol/L   Potassium, Serum: 2.7  Chloride, Serum: 108 mmol/L   Carbon Dioxide, Serum: 23 mmol/L   Anion Gap, Serum: 12 mmol/L   Blood Urea Nitrogen, Serum: 57 mg/dL   Creatinine, Serum: 9.26 mg/dL   Glucose, Serum: 159 mg/dL   Calcium, Total Serum: 9.5 mg/dL   Protein Total, Serum: 7.5 gm/dL   Albumin, Serum: 2.9 g/dL   Bilirubin Total, Serum: 0.5 mg/dL   Alkaline Phosphatase, Serum: 110 U/L   Aspartate Aminotransferase (AST/SGOT): 43 U/L   Alanine Aminotransferase (ALT/SGPT): 32 U/L    Troponin I, High Sensitivity (03.08.22 @ 11:10)   Troponin I, High Sensitivity Result: 117.1: Serial measurements of high sensitivity troponin I (hs TnI) showing rapid    Creatine Kinase, Serum (03.08.22 @ 11:10)   Creatine Kinase, Serum: 130 U/L CKMB Mass Assay (03.08.22 @ 11:10)   CKMB Units: 2.5 ng/mL   CPK Mass Assay %: 1.9 %    Flu With COVID-19 By ANABEL (03.08.22 @ 11:10)   SARS-CoV-2 Result: NotDetec: EUA/IVD   Influenza A Result: NotDetec: EUA/IVD   Influenza B Result: NotDetec: EUA/IVD    Serum Pro-Brain Natriuretic Peptide (03.08.22 @ 11:10)   Serum Pro-Brain Natriuretic Peptide: 14497 pg/mL    Prothrombin Time and INR, Plasma (03.08.22 @ 11:10)   Prothrombin Time, Plasma: 15.5: Effective February 23,2022, the reference range has changed. sec   INR: 1.30: Recommended targets/ranges for therapeutic INR    Lactate, Blood (03.08.22 @ 11:32)   Lactate, Blood: 6.7: SEE CMP FOR CRITICAL CALL DOCUMENTATION mmol/L    EKG  QRS Duration 214 ms    Q-T Interval 492 ms    QTC Calculation(Bazett) 527 ms

## 2022-03-08 NOTE — H&P ADULT - ASSESSMENT
82M presents with unresponsiveness during dialysis today. Given workup, might be due to aberrant electrolyte values.         82M presents with unresponsiveness during dialysis today. Given workup, might be due to aberrant electrolyte values such as K of 2.7.    Resp: continue with vent; cefepime and vanc for possible respiratory source of infection; follow up on sputum cx; CT chest-abd-pelvis planned to ascertain possible sources of symptomolgy  CVS: TTE planned to evaluate LV fcn  HEME: SCDs for DVT ppx; blood cx followup; ABG needed post intubation  FEN: NPO  GI: pantoprazole ppx  Renal:  follow up CMP, Mg, Phos, lactate levels and begin trending; KCl given for low K levels; follow up on urine cx  Neuro/Psych: CT head planned   82M presents with unresponsiveness during dialysis today. Patient was noted to have Vtach was shocked three times.  Gave a total of three bicarb, amiodarone 300mg, 3 epi and one amp of calcium chloride with return of pulses. Patient's electrolyte values was noted to be hypokalemic with K of 2.7. Pending other electrolytes panel.     Resp: continue with vent; CT chest-abd-pelvis planned to ascertain possible sources of symptomolgy. ABG needed post intubation  ID: cefepime and vanc for possible respiratory source of infection; follow up on sputum cx, blood culture. trend lactate  CVS: TTE planned to evaluate LV fcn. trend troponin   HEME: SCDs for DVT ppx;   FEN: NPO  GI: pantoprazole ppx  Renal:  follow up CMP, Mg, Phos, lactate levels and begin trending; KCl given for low K levels; follow up on urine cx. Nephro was consulted  Neuro/Psych: CT head planned, EEG, Sedated with Prexadex   GOC: notified family   82M presents with unresponsiveness during dialysis today. Patient was noted to have Vtach was shocked three times.  Gave a total of three bicarb, amiodarone 300mg, 3 epi and one amp of calcium chloride with return of pulses. Patient's electrolyte values was noted to be hypokalemic with K of 2.7. Pending other electrolytes panel.     Resp: continue with vent; CT chest-abd-pelvis planned to ascertain possible sources of symptomolgy. ABG needed post intubation  ID: cefepime and vanc for possible respiratory source of infection; follow up on sputum cx, blood culture. trend lactate  CVS: TTE planned to evaluate LV fcn. trend troponin   HEME: SCDs for DVT ppx;   FEN: NPO  GI: pantoprazole ppx  Renal:  follow up CMP, Mg, Phos, lactate levels and begin trending; KCl given for low K levels; follow up on urine cx. Nephro was consulted  Neuro/Psych: CT head planned, EEG, Sedated with Prexadex   GOC: notified family, full code for now

## 2022-03-08 NOTE — ED PROVIDER NOTE - SECONDARY DIAGNOSIS.
Occupational Therapy     Referred by: Cj Samuels MD; Medical Diagnosis (from order):    Diagnosis Information      Diagnosis    729.5 (ICD-9-CM) - M79.645 (ICD-10-CM) - Pain of left thumb                Daily Treatment Note    Visit:  24     SUBJECTIVE                                                                                                             Wore static/dynamic wrist extension splint prior to therapy and wore into the session   Has been using it 3x/day.  Has not been getting the numbness and tingling since has repositioned the splint.   Irritation in anterior forearm from kinesiotape   Has been using her hand more for functional tasks: doing more paddling, and shoveling. Was not able to open a tougher package at work.      OBJECTIVE                                                                                                                     Range of Motion (ROM)   (degrees unless noted; active unless noted; norms in ( ); negative=lacking to 0, positive=beyond 0)   Wrist:    - Flexion (60-80):        • Left:  45 pain    - Extension (60-70):        • Left: 52 pain  Details / Comments: After removal of dynamic split for extension       TREATMENT                                                                                                                  Therapeutic Exercise:  Passive stretch for wrist and thumb. Sharp pain at end range.  Reassessed AROM for wrist          Manual Therapy:  Scar mobilization for the dorsal thumb scar with dycem, and scar suction pull in opposite direction of tendon excursion after US.  Distraction for the left wrist. Combination of distraction with wrist motion.   Joint mobilization for wrist: dorsal and ventral glide. No pain noted.   Distraction for the thumb. Radial and dorsal glide.  Decreased swelling still noted in the central wrist and dorsum of the thumb.       Skilled input: verbal instruction/cues    Writer verbally educated and received verbal 
consent for hand placement, positioning of patient, and techniques to be performed today from patient     Home Exercise Program: 2-17-21:  Tendon glides, wrist flexion/ extension, scar massage, thumb AROM (MP flexion, IP flexion, palmar/radial abduction  4-7-21:  Rolling CMC joint for stretch of thenar area, isometric wrist extension, praying hands for wrist extension  PREs 2# wrist and forearm, 3# elbow  Putty exercises  Attended Electrical-Stimulation (14233)   Un-attended Electrical Stimulation (81738/)  Trial ETPS for 1st dorsal compartment, thenar eminence and extensor muscle group.  Ultrasound (75884)  Location: dorsal thumb scar and radial wrist.  Duty Cycle: 100%  Frequency: 1 Mhz  Intensity (w/cm2): 1.5 (3mh 1.0 wcm2 for dorsal thumb )  Duration: 8 minutes 8 min dorsal thumb  Results: tissue softening  Reaction: no adverse reaction to treatment      ASSESSMENT                                                                                                             Decreased pain with wrist joint mobilization since the use of static wrist extension splint.  Progressing strength but continues to be quite limited. Continued limitations in wrist motion with pain with minimal progression. Continues with Static/dynamic wrist extension splint.  Patient progressing with wrist, forearm and elbow strengthening at home.     Reassess next session for strength and ROM prior to physician visit. Recommendations for progression for work restrictions.       Pain/symptoms after session (out of 10): 4 and 5    Patient Education:   Results of above outlined education: Verbalizes understanding and Demonstrates understanding      PLAN                                                                                                                           Suggestions for next session as indicated: Progress per plan of care         Therapy procedure time and total treatment time can be found documented on the Time Entry 
flowsheet  
English
ESRD on dialysis

## 2022-03-08 NOTE — H&P ADULT - ATTENDING COMMENTS
pt seen in ED and upon admission to ICU with ICU team 3/8    82M PMH HTN, HLD, CVA, afib on eliquis, s/p ppm, ESRD with L arm AVF (on HD Tues/Thurs/Sat, last dialysis was on Thurs, pt missed his Sat session ), cholecystitis s/p RUQ perc cholecystostomy drain, gallstone pancreatitis presents from HD center s/p cardiac arrest. Per EMS and nephrologist, pt was found unresponsive at HD prior to initiation of dialysis. CPR started by HD staff and first rhythm check was not a shockable rhythm. 2nd rhythm check AED advised shock and pt was defibrillated. EMS found pt in v tach. Patient was given a total of one shock in the dialysis center on arrival and intubated in the field. Patient continued to be in V tach with reported three more shocks at 200 Joules delivered by EMS. Pt also received three bicarb, amiodarone 300mg, 3 epi and one amp of calcium chloride with return of pulses. Unknown exact down time. L tibial IO inserted. Arrived to ED with good pulses and stable vitals. Labs with hypokalemia, Cr 9, BUN 50s, lactate 6.7, trop: 890. On exam with noted myoclonic jerking    DX: cardiac arrest with ROSC, VT arrest, respiratory arrest, acute respiratory failure requiring intubation, ESRD on HD, ?anoxic encephalopathy with myoclonus, hypokalemia, metabolic / lactic acidosis    NEURO  off sedation  + brain stem reflexes however not following commands  check CT head  EEG to eval for seizures  myoclonus post arrest     CV  s/p defibrillation x4 in the field  s/p amio in the field  currently on monitor pt in a paced rhythm  2D echo to evaluate cardiac function  start heparin drip for underlying hx of a-fib with CVA but also for NSTEMI if no bleed on CT head  trend troponin  hypertensive, does not require pressor support at present time    PULM  mechanical vent support  increase RR to compensate for underlying metabolic acidosis  weaning as tolerates  check sputum cx    RENAL  for HD today  with recent arrest planned for HD tomorrow if stable per renal  supplement hypokalemia    ID  check blood cx, u cx, sputum cx  will place on empiric vanco x1 and cefepime  will get CT chest/abd/pelvis to evaluate potential source of infection, to eval perc denae tube (which is draining bilious fluid)    GI  place NGT for decompression and for eventual feeds and meds  check that perc denae tube in place  elevated lactate may be from cardiac arrest itself, follow up CT abdomen    GEN  prognosis poor  unknown exact down time but with the number of shocks delivered and meds given down time probably > 10 min more likely closer to perhaps 15 min  pt is full code for now  DVT prophylaxis: starting on heparin drip for ACS  if there is neuro recovery will likely pursue further cardiac ischemic work up

## 2022-03-08 NOTE — ED PROVIDER NOTE - OBJECTIVE STATEMENT
82 year old male with h/o HTN, HLD, CVA, ESRD ( on dialysisi Tues/Thurs/Sat) presents today biba from dialysis where pt was found unresponsive, pt was to get dialysis today when he became unresponsive 82 year old male with h/o HTN, HLD, CVA, ESRD ( on dialysisi Tues/Thurs/Sat, pt's last dialysis was on Thurs, pt missed his Sat session ) presents today biba from dialysis where pt was found unresponsive, pt was to get dialysis today became unresponsive, pt was found in v tach, given a total of one shock in the dialysis center, on arrival EMS, pt was intubated in the field, continued to be in V tach, administered three more shocks at 200 Joules, given a total of three bicarb, amiodarone 300mg, 3 epi and one amp of calcium chloride with return of pulses, pt was brought in with good pulses, immediately assessed, tube placement verified with chest auscultation and portable cxr, pt hypotensive given IVf and placed on the ventilator 82 year old male with h/o HTN, HLD, CVA, ESRD ( on dialysisi Tues/Thurs/Sat, pt's last dialysis was on Thurs, pt missed his Sat session ) and cholecystitis/gallstone pancreatitis s/p RUQ perc denae drainage presents today biba from dialysis where pt was found unresponsive, pt was to get dialysis today became unresponsive, pt was found in v tach, given a total of one shock in the dialysis center, on arrival EMS, pt was intubated in the field, continued to be in V tach, administered three more shocks at 200 Joules, given a total of three bicarb, amiodarone 300mg, 3 epi and one amp of calcium chloride with return of pulses, pt was brought in with good pulses, immediately assessed, tube placement verified with chest auscultation and portable cxr, pt hypotensive given IVf and placed on the ventilator

## 2022-03-08 NOTE — ED PROVIDER NOTE - CLINICAL SUMMARY MEDICAL DECISION MAKING FREE TEXT BOX
pt presented with ROSC s/p cardiac arrest which occurred in dialysis, pt intubated in the field, admitted to icu with renal consult

## 2022-03-08 NOTE — ED ADULT NURSE REASSESSMENT NOTE - NS ED NURSE REASSESS COMMENT FT1
Pt. presents post cardiac arrest, intubated and shocked in the field. NS bolus infusing. Pt. with LLE IO, and R IJ #18 and R AC #20 in place. No chino to be placed at this time as per Dr. Estrada.

## 2022-03-08 NOTE — ED ADULT TRIAGE NOTE - CHIEF COMPLAINT QUOTE
pt post cardiac arrest. pt cardiac arrest while in dialysis center prior to dialyis treatment. Amiodarone 300 iv, epi x 3, CaCl x 1, shock x 3 pta. intubated in field 7.5 ET tube.

## 2022-03-08 NOTE — ED ADULT TRIAGE NOTE - HEART RATE (BEATS/MIN)
Scribe Attestation (For Scribes USE Only)... 83 Attending Attestation (For Attendings USE Only).../Scribe Attestation (For Scribes USE Only)...

## 2022-03-09 NOTE — PROGRESS NOTE ADULT - CONVERSATION DETAILS
Update on pt's condition, poor mental status, events leading to hospitalization with cardiac arrest reviewed with family    Advanced directives addressed with family    Pt with myoclonic jerking post arrest. Remains unresponsive.     Prognosis overall is poor.     Family still in shock and overwhelmed with events. We discussed what the wishes of the pt and family would be in event another cardiac arrest were to occur while on current therapy.    Both wife and grand daughter state they "do not want him to suffer"    They need some time to process all that has occurred. They have agreed to DNR in the vent pt has another cardiac arrest and to allow for a peaceful natural passing if his heart were to stop again.    However they would like current therapy to continue including HD, vent support, antibiotics to give him a chance to potentially recover.

## 2022-03-09 NOTE — PROGRESS NOTE ADULT - SUBJECTIVE AND OBJECTIVE BOX
24 hour events: Pt was brought into ED by EMS after becoming unresponsive during dialysis yesterday. Pt was shocked x3 and received 3 Epi in the field. In ED, pt had good pulses but was hypotensive and still unresponsive and was placed on vent. Pt was found to have lactose elevated to 6.7 initially, troponin 2331.7, and BNP over 02526. No other acute events overnight.    MEDICATIONS  (STANDING):  artificial tears (preservative free) Ophthalmic Solution 1 Drop(s) Both EYES daily  cefepime   IVPB 1000 milliGRAM(s) IV Intermittent every 24 hours  cefepime   IVPB      chlorhexidine 0.12% Liquid 15 milliLiter(s) Oral Mucosa every 12 hours  chlorhexidine 2% Cloths 1 Application(s) Topical <User Schedule>  dexMEDEtomidine Infusion 0.2 MICROgram(s)/kG/Hr (4.24 mL/Hr) IV Continuous <Continuous>  heparin  Infusion.  Unit(s)/Hr (10 mL/Hr) IV Continuous <Continuous>  norepinephrine Infusion 0.05 MICROgram(s)/kG/Min (7.95 mL/Hr) IV Continuous <Continuous>  pantoprazole  Injectable 40 milliGRAM(s) IV Push daily  propofol Infusion 10.692 MICROgram(s)/kG/Min (5.44 mL/Hr) IV Continuous <Continuous>    MEDICATIONS  (PRN):  heparin   Injectable 5000 Unit(s) IV Push every 6 hours PRN For aPTT less than 40      CAPILLARY BLOOD GLUCOSE      POCT Blood Glucose.: 72 mg/dL (09 Mar 2022 13:31)  POCT Blood Glucose.: 114 mg/dL (09 Mar 2022 10:05)  POCT Blood Glucose.: 113 mg/dL (09 Mar 2022 06:53)  POCT Blood Glucose.: 141 mg/dL (09 Mar 2022 02:48)  POCT Blood Glucose.: 143 mg/dL (08 Mar 2022 21:33)    I&O's Summary    08 Mar 2022 07:01  -  09 Mar 2022 07:00  --------------------------------------------------------  IN: 1045.1 mL / OUT: 890 mL / NET: 155.1 mL    09 Mar 2022 07:01  -  09 Mar 2022 14:37  --------------------------------------------------------  IN: 114.1 mL / OUT: 0 mL / NET: 114.1 mL        PHYSICAL EXAM:  Vital Signs Last 24 Hrs  T(C): 36.3 (03-09-22 @ 10:55)  T(F): 97.3 (03-09-22 @ 10:55), Max: 97.3 (03-09-22 @ 10:55)  HR: 80 (03-09-22 @ 14:30) (69 - 96)  BP: 125/71 (03-09-22 @ 14:00)  BP(mean): 81 (03-09-22 @ 14:00) (52 - 99)  RR: 33 (03-09-22 @ 14:30) (16 - 39)  SpO2: 100% (03-09-22 @ 14:30) (73% - 100%)  Wt(kg): --    03-08 @ 07:01  -  03-09 @ 07:00  --------------------------------------------------------  IN: 1045.1 mL / OUT: 890 mL / NET: 155.1 mL    03-09 @ 07:01  -  03-09 @ 14:37  --------------------------------------------------------  IN: 114.1 mL / OUT: 0 mL / NET: 114.1 mL      Constitutional: arousable, lying on stretcher  EYES: pupils 3 mm, sluggishly reactive to light  Neck: no thyromegaly, no hematoma  Respiratory: on ventilator, good air movement  Cardiovascular: S1 and S2, regular rate and rhythm  Gastrointestinal: Biliary drain on right side of abdomen, mildly distended abdomen  Neurological: gag reflex intact, other CN exams ltd due to AMS, little tone in extremities  Musculoskeletal: no joint swelling, not moving extremities  Skin: No rashes, no lacerations      LABS:                        10.2   9.62  )-----------( 190      ( 09 Mar 2022 04:58 )             31.4     03-09    139  |  106  |  61<H>  ----------------------------<  145<H>  3.8   |  18<L>  |  8.95<H>    Ca    8.2<L>      09 Mar 2022 04:58  Phos  5.8     03-09  Mg     2.3     03-09    TPro  6.8  /  Alb  2.4<L>  /  TBili  1.0  /  DBili  x   /  AST  68<H>  /  ALT  39  /  AlkPhos  99  03-09    PT/INR - ( 08 Mar 2022 11:10 )   PT: 15.5 sec;   INR: 1.30 ratio         PTT - ( 09 Mar 2022 14:01 )  PTT:75.5 sec  CARDIAC MARKERS ( 08 Mar 2022 11:10 )  x     / x     / 130 U/L / x     / 2.5 ng/mL          RADIOLOGY & ADDITIONAL TESTS:    < from: CT Abdomen and Pelvis No Cont (03.08.22 @ 14:41) >  Exam limited by noncontrast technique and extensive motion artifact.    Small patchy opacities in the left lower lobe suspicious for pneumonia.    Percutaneous cholecystostomy tube in right upper quadrant with exact   positioning and inflammatory changes difficult to evaluate due to   extensive motion.    Complex appearing 8 cm right lowerpole renal lesion for which follow-up   ultrasound or MR is recommended.    < end of copied text >    < from: CT Head No Cont (03.08.22 @ 14:40) >  Markedly limited by motion.    Slight global reduction in gray-white matter differentiation, concerning   for developing/early cerebral edema.    Redemonstration of chronic left parietotemporal lobe infarct.    No gross acute intracranial hemorrhage, mass effect, midline shift, or   herniation.    < end of copied text >

## 2022-03-09 NOTE — PROGRESS NOTE ADULT - ASSESSMENT
82M presents with unresponsiveness during dialysis today. Patient was noted to have Vtach was shocked three times.    Resp: continue with vent; CT chest-abd-pelvis signif for possible pneumonia. ABG needed post intubation  ID: cefepime and vanc for possible respiratory source of infection; follow up on sputum cx, blood culture. trend lactate  CVS: TTE with EF 45-50% EF. trend troponin   HEME: SCDs for DVT ppx;   FEN: NPO  GI: pantoprazole ppx  Renal:  follow up CMP, Mg, Phos, lactate levels and begin trending; KCl given for low K levels; follow up on urine cx. Nephro was consulted; HD done today  Neuro/Psych: CT head yday without any acut bleeds or mass effect, EEG, Sedated with Prexadex   GOC: notified family, full code for now

## 2022-03-09 NOTE — PROGRESS NOTE ADULT - ATTENDING COMMENTS
pt seen in ED and upon admission to ICU with ICU team 3/9    24 hr events:  remains unresponsive  HD today  required light sedation as pt was having lots of myoclonic jerking overnight    POCUS:  slightly decreased LV fuction, LA and RA appears enlarged, IVC without respiratory variations  a line predominance on lung exam, scatterred B lines at dependent bases bilaterally     82M PMH HTN, HLD, CVA, afib on eliquis, s/p ppm, ESRD with L arm AVF (on HD Tues/Thurs/Sat, last dialysis was on Thurs, pt missed his Sat session ), cholecystitis s/p RUQ perc cholecystostomy drain, gallstone pancreatitis presents from HD center s/p cardiac arrest. Per EMS and nephrologist, pt was found unresponsive at HD prior to initiation of dialysis. CPR started by HD staff and first rhythm check was not a shockable rhythm. 2nd rhythm check AED advised shock and pt was defibrillated. EMS found pt in v tach. Patient was given a total of one shock in the dialysis center on arrival and intubated in the field. Patient continued to be in V tach with reported three more shocks at 200 Joules delivered by EMS. Pt also received three bicarb, amiodarone 300mg, 3 epi and one amp of calcium chloride with return of pulses. Unknown exact down time. L tibial IO inserted. Arrived to ED with good pulses and stable vitals. Labs with hypokalemia, Cr 9, BUN 50s, lactate 6.7, trop: 890. On exam with noted myoclonic jerking    DX: cardiac arrest with ROSC, VT arrest, respiratory arrest, acute respiratory failure requiring intubation, ESRD on HD, ?anoxic encephalopathy with myoclonus, hypokalemia, metabolic / lactic acidosis    NEURO  wean off sedation to assess full mental status  + brain stem reflexes however not following commands  CT head without acute pathology, no ICH  await EEG to eval for seizures  myoclonus post arrest     CV  s/p defibrillation x4 in the field  s/p amio in the field  currently on monitor pt in a paced rhythm  to cont heparin drip underlying hx of a-fib with CVA but also for NSTEMI   no IV pressor requirements needed, hypertensive on admission    PULM  mechanical vent support  daily weaning as tolerates  follow up  on antibx for PNA    RENAL  HD today  appreciate renal follow up    ID  follow up blood cx, u cx, sputum cx  on empiric cefepime  s/p vanco x1    GI  start tube feeds  perc denae drain in place: bilious output draining    GEN  prognosis poor  unknown exact down time but with the number of shocks delivered and meds given down time probably > 10 min more likely closer to perhaps 15 min or longer  DVT prophylaxis: on heparin drip for ACS  if there is neuro recovery will likely pursue further cardiac ischemic work up  GOC with family today: pt made DNR in event of another cardiac arrest

## 2022-03-09 NOTE — PROGRESS NOTE ADULT - SUBJECTIVE AND OBJECTIVE BOX
[facility] NEPHROLOGY SERVICES, [facility]  NEPHROLOGY AND HYPERTENSION  [address]  [address]  [place] [address]  [phone]    MD BLANCA SCHMIDT, MD MARY LOU DOVER, MD LORRAINE TUTTLE, MD CHRISTIAN BELL, MD TAMY WELDON, MD KARENA RODRIGUEZ MD          Patient events noted  vent dependent; sedated      MEDICATIONS  (STANDING):  artificial  tears Solution 1 Drop(s) Both EYES daily  cefepime   IVPB 1000 milliGRAM(s) IV Intermittent every 24 hours  cefepime   IVPB      chlorhexidine 0.12% Liquid 15 milliLiter(s) Oral Mucosa every 12 hours  chlorhexidine 2% Cloths 1 Application(s) Topical <User Schedule>  dexMEDEtomidine Infusion 0.2 MICROgram(s)/kG/Hr (4.24 mL/Hr) IV Continuous <Continuous>  heparin  Infusion.  Unit(s)/Hr (10 mL/Hr) IV Continuous <Continuous>  norepinephrine Infusion 0.05 MICROgram(s)/kG/Min (7.95 mL/Hr) IV Continuous <Continuous>  pantoprazole  Injectable 40 milliGRAM(s) IV Push daily  propofol Infusion 10.692 MICROgram(s)/kG/Min (5.44 mL/Hr) IV Continuous <Continuous>    MEDICATIONS  (PRN):  heparin   Injectable 5000 Unit(s) IV Push every 6 hours PRN For aPTT less than 40      03-08-22 @ 07:01  -  03-09-22 @ 07:00  --------------------------------------------------------  IN: 1045.1 mL / OUT: 890 mL / NET: 155.1 mL    03-09-22 @ 07:01  -  03-09-22 @ 17:46  --------------------------------------------------------  IN: 328.9 mL / OUT: 500 mL / NET: -171.1 mL      PHYSICAL EXAM:      T(C): 37.1 (03-09-22 @ 16:22), Max: 37.1 (03-09-22 @ 16:22)  HR: 69 (03-09-22 @ 17:11) (69 - 87)  BP: 97/55 (03-09-22 @ 16:00) (74/44 - 133/88)  RR: 19 (03-09-22 @ 16:00) (16 - 39)  SpO2: 100% (03-09-22 @ 17:11) (100% - 100%)  Wt(kg): --  Lungs clear  Heart S1S2  Abd soft NT ND  Extremities:   tr edema                                    10.2   9.62  )-----------( 190      ( 09 Mar 2022 04:58 )             31.4     03-09    139  |  106  |  61<H>  ----------------------------<  145<H>  3.8   |  18<L>  |  8.95<H>    Ca    8.2<L>      09 Mar 2022 04:58  Phos  5.8     03-09  Mg     2.3     03-09    TPro  6.8  /  Alb  2.4<L>  /  TBili  1.0  /  DBili  x   /  AST  68<H>  /  ALT  39  /  AlkPhos  99  03-09    ABG - ( 08 Mar 2022 17:07 )  pH, Arterial: 7.36  pH, Blood: x     /  pCO2: 25    /  pO2: 326   / HCO3: 14    / Base Excess: -10.0 /  SaO2: 100.0             LIVER FUNCTIONS - ( 09 Mar 2022 04:58 )  Alb: 2.4 g/dL / Pro: 6.8 gm/dL / ALK PHOS: 99 U/L / ALT: 39 U/L / AST: 68 U/L / GGT: x           Creatinine Trend: 8.95<--, 7.87<--, 9.34<--, 9.16<--, 9.26<--, 6.91<--  Mode: AC/ CMV (Assist Control/ Continuous Mandatory Ventilation)  RR (machine): 20  TV (machine): 500  FiO2: 30  PEEP: 5  ITime: 1  MAP: 9  PIP: 23        Assessment   ESRD; post Vtach arrest; ACLS protocol;   ROSC; intubated; elevated WBC     Plan  HD for today  UF as tolerated  Question of hypoxic neurological injury;       Abbe Jerry MD

## 2022-03-10 NOTE — DIETITIAN INITIAL EVALUATION ADULT. - ENERGY INTAKE
Receiving Nepro @  40 ml/hr via NGT =  (1728 kristie, 78 gm pro, 701 mL free water) + additional 387 calories from propofol daily.

## 2022-03-10 NOTE — PROGRESS NOTE ADULT - ATTENDING COMMENTS
pt seen on rounds with ICU team 3/10    24 hr events:  off precedex  propofol for vent synchrony  developed hematuria overnight  heparin drip d/jesus due to  hematuria  remains unresponsive  HD yesterday   for HD again today  still with myoclonus  febrile      82M PMH HTN, HLD, CVA, afib on eliquis, s/p ppm, ESRD with L arm AVF (on HD Tues/Thurs/Sat, last dialysis was on Thurs, pt missed his Sat session ), cholecystitis s/p RUQ perc cholecystostomy drain, gallstone pancreatitis presents from HD center s/p cardiac arrest. Per EMS and nephrologist, pt was found unresponsive at HD prior to initiation of dialysis. CPR started by HD staff and first rhythm check was not a shockable rhythm. 2nd rhythm check AED advised shock and pt was defibrillated. EMS found pt in v tach. Patient was given a total of one shock in the dialysis center on arrival and intubated in the field. Patient continued to be in V tach with reported three more shocks at 200 Joules delivered by EMS. Pt also received three bicarb, amiodarone 300mg, 3 epi and one amp of calcium chloride with return of pulses. Unknown exact down time. L tibial IO inserted. Arrived to ED with good pulses and stable vitals. Labs with hypokalemia, Cr 9, BUN 50s, lactate 6.7, trop: 890. On exam with noted myoclonic jerking. Initiated on heparin drip for NSTEMI. Developed hematuria    DX: cardiac arrest with ROSC, VT arrest, NSTEMI, respiratory arrest, acute respiratory failure requiring intubation, PNA, ESRD on HD, hematuria, anoxic encephalopathy with myoclonus, hypokalemia, metabolic / lactic acidosis    NEURO  wean off sedation to assess full mental status  + brain stem reflexes however not following commands  opens eyes but does not track, no blink to threat  initial CT head without acute pathology, no ICH  await EEG to eval for seizures  myoclonus post arrest     CV  s/p defibrillation x4 in the field  s/p amio in the field  currently on monitor pt in a paced rhythm  heparin drip was started for underlying hx of a-fib with CVA but also for NSTEMI; however AC d/jesus due to hematuria  ASA not initiated due to hematuria  initially hypertensive, now hypotensive requiring levophed for BP support  POCUS with mildly reduced EF  official 2D echo with EF 45-50%, grade III diastolic dysfunction, LA/RA enlargement, mod TR, mild AS    PULM  mechanical vent support  daily weaning as tolerates  on cefepime for PNA  follow up sputum cx    RENAL  HD yesterday  for HD again today per renal  hypokalemia resolved with supplementation and HD  appreciate renal follow up    ID  blood cx negative to date  follow up u cx and sputum cx  on empiric cefepime for PNA, opacities noted at bases on CXR  s/p vanco x1    GI  tube feeds  perc denae drain in place: bilious output draining    GEN  prognosis poor  unknown exact down time but with the number of shocks delivered and meds given down time probably > 10 min more likely closer to perhaps 15 min or longer  DVT prophylaxis: bilateral compression devices  if there is neuro recovery will pursue further cardiac ischemic work up  GOC with family yesterday: pt made DNR in event of another cardiac arrest, current management to continue.

## 2022-03-10 NOTE — DIETITIAN INITIAL EVALUATION ADULT. - DIET TYPE
Recommend: Nepro @ 40 mL/hr, increase to goal rate 45mL/hr = (1944 kristie, 87 gm pro, 785 mL free water)/NPO with tube feedings

## 2022-03-10 NOTE — PROGRESS NOTE ADULT - ASSESSMENT
82M presents with unresponsiveness during dialysis on 3/8    Resp: continue with vent; CT chest-abd-pelvis signif for possible pneumonia. ABG needed post intubation  ID: cefepime and vanc for possible respiratory source of infection; follow up on sputum cx, blood culture. trend lactate  CVS: TTE with EF 45-50% EF. trend troponin   HEME: SCDs for DVT ppx;   FEN: NPO with tube feed  GI: pantoprazole ppx  Renal:  follow up CMP, Mg, Phos, lactate levels and trend; follow up on urine cx. Nephro was consulted; HD per renal  Neuro/Psych: CT head yday without any acute bleeds or mass effect, EEG; weaned off sedation and no longer on precedex  GOC: notified family, DNR in case of cardiac arrest but continue all current measures (vent, HD) 82M presents with unresponsiveness during dialysis on 3/8 s/p V tach cardiac arrest    Resp: continue with vent; CT chest-abd-pelvis signif for possible pneumonia. weaning trial  ID: cefepime and vanc for possible respiratory source of infection; follow up on sputum cx, blood culture. trend lactate  CVS: TTE with EF 45-50% EF. trend troponin   HEME: SCDs for DVT ppx;   GI: NPO with tube feed, pantoprazole ppx  Renal:  follow up CMP, Mg, Phos, lactate levels and trend; follow up on urine cx. Nephro was consulted; HD per renal  Neuro/Psych: CT head yday without any acute bleeds or mass effect, EEG; weaned off sedation and no longer on precedex  GOC: notified family, DNR in case of cardiac arrest but continue all current measures (vent, HD)

## 2022-03-10 NOTE — DIETITIAN INITIAL EVALUATION ADULT. - PERTINENT LABORATORY DATA
03-10 Na140 mmol/L Glu 113 mg/dL<H> K+ 3.1 mmol/L<L> Cr  6.29 mg/dL<H> BUN 39 mg/dL<H> 03-10 Phos 3.9 mg/dL 03-10 Alb 2.5 g/dL<L>03-10 ALT 29 U/L AST 59 U/L<H> Alkaline Phosphatase 98 U/L

## 2022-03-10 NOTE — PROGRESS NOTE ADULT - SUBJECTIVE AND OBJECTIVE BOX
Patient is a 82y old  Male who presents with a chief complaint of Vtach, cardiac arrest, ROSC (10 Mar 2022 11:27)      SUBJECTIVE / OVERNIGHT EVENTS:    MEDICATIONS  (STANDING):  artificial  tears Solution 1 Drop(s) Both EYES every 6 hours  artificial  tears Solution 1 Drop(s) Both EYES daily  cefepime   IVPB 1000 milliGRAM(s) IV Intermittent every 24 hours  cefepime   IVPB      chlorhexidine 0.12% Liquid 15 milliLiter(s) Oral Mucosa every 12 hours  chlorhexidine 2% Cloths 1 Application(s) Topical <User Schedule>  norepinephrine Infusion 0.05 MICROgram(s)/kG/Min (7.95 mL/Hr) IV Continuous <Continuous>  pantoprazole  Injectable 40 milliGRAM(s) IV Push daily  propofol Infusion 10.692 MICROgram(s)/kG/Min (5.44 mL/Hr) IV Continuous <Continuous>    MEDICATIONS  (PRN):  acetaminophen     Tablet .. 650 milliGRAM(s) Oral every 6 hours PRN Temp greater or equal to 38.5C (101.3F)      CAPILLARY BLOOD GLUCOSE      POCT Blood Glucose.: 116 mg/dL (10 Mar 2022 09:59)  POCT Blood Glucose.: 93 mg/dL (10 Mar 2022 05:39)  POCT Blood Glucose.: 75 mg/dL (10 Mar 2022 02:29)  POCT Blood Glucose.: 129 mg/dL (09 Mar 2022 21:46)  POCT Blood Glucose.: 81 mg/dL (09 Mar 2022 17:01)  POCT Blood Glucose.: 72 mg/dL (09 Mar 2022 13:31)    I&O's Summary    09 Mar 2022 07:01  -  10 Mar 2022 07:00  --------------------------------------------------------  IN: 1070.2 mL / OUT: 1100 mL / NET: -29.8 mL    10 Mar 2022 07:01  -  10 Mar 2022 12:41  --------------------------------------------------------  IN: 183.6 mL / OUT: 0 mL / NET: 183.6 mL        PHYSICAL EXAM:  Vital Signs Last 24 Hrs  T(C): 38.3 (03-10-22 @ 11:30)  T(F): 100.9 (03-10-22 @ 11:30), Max: 101.6 (03-09-22 @ 23:11)  HR: 69 (03-10-22 @ 12:08) (69 - 80)  BP: 103/52 (03-10-22 @ 11:30)  BP(mean): 65 (03-10-22 @ 11:30) (61 - 83)  RR: 22 (03-10-22 @ 11:30) (13 - 33)  SpO2: 99% (03-10-22 @ 12:08) (99% - 100%)  Wt(kg): --    03-09 @ 07:01  -  03-10 @ 07:00  --------------------------------------------------------  IN: 1070.2 mL / OUT: 1100 mL / NET: -29.8 mL    03-10 @ 07:01  -  03-10 @ 12:41  --------------------------------------------------------  IN: 183.6 mL / OUT: 0 mL / NET: 183.6 mL      Constitutional: arousable, lying on stretcher  EYES: pupils 3 mm, sluggishly reactive to light  Neck: no thyromegaly, no hematoma  Respiratory: on ventilator, good air movement  Cardiovascular: S1 and S2, regular rate and rhythm  Gastrointestinal: Biliary drain on right side of abdomen, mildly distended abdomen  Neurological: gag reflex intact, other CN exams ltd due to AMS, little tone in extremities  Musculoskeletal: no joint swelling, not moving extremities  Skin: No rashes, no lacerations      LABS:                        10.4   8.53  )-----------( 207      ( 10 Mar 2022 04:50 )             31.9     03-10    140  |  104  |  39<H>  ----------------------------<  113<H>  3.1<L>   |  26  |  6.29<H>    Ca    8.5      10 Mar 2022 04:50  Phos  3.9     03-10  Mg     2.6     03-10    TPro  7.2  /  Alb  2.5<L>  /  TBili  0.7  /  DBili  x   /  AST  59<H>  /  ALT  29  /  AlkPhos  98  03-10    PTT - ( 10 Mar 2022 04:49 )  PTT:32.3 sec          RADIOLOGY & ADDITIONAL TESTS:    Imaging Personally Reviewed:    Consultant(s) Notes Reviewed:      Care Discussed with Consultants/Other Providers:   Patient is a 82y old  Male who presents with a chief complaint of Vtach, cardiac arrest, ROSC (10 Mar 2022 11:27)      SUBJECTIVE / OVERNIGHT EVENTS:  no acute events overnight  made DNR yesterday night  had hemodialysis yesterday    MEDICATIONS  (STANDING):  artificial  tears Solution 1 Drop(s) Both EYES every 6 hours  artificial  tears Solution 1 Drop(s) Both EYES daily  cefepime   IVPB 1000 milliGRAM(s) IV Intermittent every 24 hours  cefepime   IVPB      chlorhexidine 0.12% Liquid 15 milliLiter(s) Oral Mucosa every 12 hours  chlorhexidine 2% Cloths 1 Application(s) Topical <User Schedule>  norepinephrine Infusion 0.05 MICROgram(s)/kG/Min (7.95 mL/Hr) IV Continuous <Continuous>  pantoprazole  Injectable 40 milliGRAM(s) IV Push daily  propofol Infusion 10.692 MICROgram(s)/kG/Min (5.44 mL/Hr) IV Continuous <Continuous>    MEDICATIONS  (PRN):  acetaminophen     Tablet .. 650 milliGRAM(s) Oral every 6 hours PRN Temp greater or equal to 38.5C (101.3F)      CAPILLARY BLOOD GLUCOSE      POCT Blood Glucose.: 116 mg/dL (10 Mar 2022 09:59)  POCT Blood Glucose.: 93 mg/dL (10 Mar 2022 05:39)  POCT Blood Glucose.: 75 mg/dL (10 Mar 2022 02:29)  POCT Blood Glucose.: 129 mg/dL (09 Mar 2022 21:46)  POCT Blood Glucose.: 81 mg/dL (09 Mar 2022 17:01)  POCT Blood Glucose.: 72 mg/dL (09 Mar 2022 13:31)    I&O's Summary    09 Mar 2022 07:01  -  10 Mar 2022 07:00  --------------------------------------------------------  IN: 1070.2 mL / OUT: 1100 mL / NET: -29.8 mL    10 Mar 2022 07:01  -  10 Mar 2022 12:41  --------------------------------------------------------  IN: 183.6 mL / OUT: 0 mL / NET: 183.6 mL        PHYSICAL EXAM:  Vital Signs Last 24 Hrs  T(C): 38.3 (03-10-22 @ 11:30)  T(F): 100.9 (03-10-22 @ 11:30), Max: 101.6 (03-09-22 @ 23:11)  HR: 69 (03-10-22 @ 12:08) (69 - 80)  BP: 103/52 (03-10-22 @ 11:30)  BP(mean): 65 (03-10-22 @ 11:30) (61 - 83)  RR: 22 (03-10-22 @ 11:30) (13 - 33)  SpO2: 99% (03-10-22 @ 12:08) (99% - 100%)      03-09 @ 07:01  -  03-10 @ 07:00  --------------------------------------------------------  IN: 1070.2 mL / OUT: 1100 mL / NET: -29.8 mL    03-10 @ 07:01  -  03-10 @ 12:41  --------------------------------------------------------  IN: 183.6 mL / OUT: 0 mL / NET: 183.6 mL      Constitutional: opens eyes, lying in bed  EYES: pupils 3 mm, sluggishly reactive to light  Neck: no thyromegaly, no hematoma  Respiratory: on ventilator, good air movement  Cardiovascular: S1 and S2, regular rate and rhythm  Gastrointestinal: Biliary drain on right side of abdomen, mildly distended abdomen  Neurological: gag reflex intact, other CN exams ltd due to AMS, little tone in extremities  Musculoskeletal: no joint swelling, not moving extremities  Skin: No rashes, no lacerations      LABS:                        10.4   8.53  )-----------( 207      ( 10 Mar 2022 04:50 )             31.9     03-10    140  |  104  |  39<H>  ----------------------------<  113<H>  3.1<L>   |  26  |  6.29<H>    Ca    8.5      10 Mar 2022 04:50  Phos  3.9     03-10  Mg     2.6     03-10    TPro  7.2  /  Alb  2.5<L>  /  TBili  0.7  /  DBili  x   /  AST  59<H>  /  ALT  29  /  AlkPhos  98  03-10    PTT - ( 10 Mar 2022 04:49 )  PTT:32.3 sec          RADIOLOGY & ADDITIONAL TESTS:    Imaging Personally Reviewed:    Consultant(s) Notes Reviewed:      Care Discussed with Consultants/Other Providers:

## 2022-03-10 NOTE — PROGRESS NOTE ADULT - SUBJECTIVE AND OBJECTIVE BOX
NEPHROLOGY PROGRESS NOTE    CHIEF COMPLAINT:  ESRD    HPI:  Remains vented on levophed.  He had HD yesterday.    EXAM:  T(F): 100.1 (03-10-22 @ 07:22)  HR: 69 (03-10-22 @ 10:00)  BP: 109/54 (03-10-22 @ 10:00)  RR: 20 (03-10-22 @ 10:00)  SpO2: 99% (03-10-22 @ 10:00)    Poorly responsive  Normal respiratory effort, lungs clear bilaterally  Heart RRR with no murmur, no peripheral edema  LUE AVF + bruit         LABS                             10.4   8.53  )-----------( 207      ( 10 Mar 2022 04:50 )             31.9          03-10    140  |  104  |  39<H>  ----------------------------<  113<H>  3.1<L>   |  26  |  6.29<H>    Ca    8.5      10 Mar 2022 04:50  Phos  3.9     03-10  Mg     2.6     03-10    TPro  7.2  /  Alb  2.5<L>  /  TBili  0.7  /  DBili  x   /  AST  59<H>  /  ALT  29  /  AlkPhos  98  03-10        Assessment   ESRD; post Vtach arrest; ACLS protocol with ROSC  Possible hypoxic brain injury    Plan  HD tomorrow  Supplement KCl 40 meq today         NEPHROLOGY PROGRESS NOTE    CHIEF COMPLAINT:  ESRD    HPI:  Remains vented on levophed.  He had HD yesterday.    EXAM:  T(F): 100.1 (03-10-22 @ 07:22)  HR: 69 (03-10-22 @ 10:00)  BP: 109/54 (03-10-22 @ 10:00)  RR: 20 (03-10-22 @ 10:00)  SpO2: 99% (03-10-22 @ 10:00)    Poorly responsive  Normal respiratory effort, lungs clear bilaterally  Heart RRR with no murmur, no peripheral edema  LUE AVF + bruit         LABS                             10.4   8.53  )-----------( 207      ( 10 Mar 2022 04:50 )             31.9          03-10    140  |  104  |  39<H>  ----------------------------<  113<H>  3.1<L>   |  26  |  6.29<H>    Ca    8.5      10 Mar 2022 04:50  Phos  3.9     03-10  Mg     2.6     03-10    TPro  7.2  /  Alb  2.5<L>  /  TBili  0.7  /  DBili  x   /  AST  59<H>  /  ALT  29  /  AlkPhos  98  03-10        Assessment   ESRD; post Vtach arrest; ACLS protocol with ROSC  Possible hypoxic brain injury    Plan  Hemodialysis today with 4K bath, no fluid removal, in lieu of tomorrow

## 2022-03-10 NOTE — DIETITIAN INITIAL EVALUATION ADULT. - PERTINENT MEDS FT
MEDICATIONS  (STANDING):  artificial  tears Solution 1 Drop(s) Both EYES every 6 hours  artificial  tears Solution 1 Drop(s) Both EYES daily  cefepime   IVPB 1000 milliGRAM(s) IV Intermittent every 24 hours  cefepime   IVPB      chlorhexidine 0.12% Liquid 15 milliLiter(s) Oral Mucosa every 12 hours  chlorhexidine 2% Cloths 1 Application(s) Topical <User Schedule>  norepinephrine Infusion 0.05 MICROgram(s)/kG/Min (7.95 mL/Hr) IV Continuous <Continuous>  pantoprazole  Injectable 40 milliGRAM(s) IV Push daily  propofol Infusion 10.692 MICROgram(s)/kG/Min (5.44 mL/Hr) IV Continuous <Continuous>    MEDICATIONS  (PRN):  acetaminophen     Tablet .. 650 milliGRAM(s) Oral every 6 hours PRN Temp greater or equal to 38.5C (101.3F)

## 2022-03-10 NOTE — DIETITIAN INITIAL EVALUATION ADULT. - ENTERAL
Recommend: Nepro @ 40 mL/hr, increase to goal rate 45mL/hr =  (1944 kristie, 87 gm pro, 785 mL free water)

## 2022-03-10 NOTE — DIETITIAN INITIAL EVALUATION ADULT. - OTHER INFO
Pt seen in ICU, sedated, on vent , intubated w/ AMS. Last HD on 3/9. Receiving Nepro @  40 ml/hr via NGT =  (1728 kristie, 78 gm pro, 701 mL free water) + additional 387 calories from propofol daily. Pt w/ PMH HTN , HLD, CVA ESRD on HD ( Tues , Thurs and Sat). He had missed his Saturday session and was BIBA. S/P Cardiac arrest. Pt w/ ventricular tachyarrhythmia ; electrolyte imbalance ; cholecystitis s/p RUQ perc cholecystostomy drain, gallstone pancreatitis. Pt w/ poor prognosis. Partial Purse String (Intermediate) Text: Given the location of the defect and the characteristics of the surrounding skin an intermediate purse string closure was deemed most appropriate.  Undermining was performed circumfirentially around the surgical defect.  A purse string suture was then placed and tightened. Wound tension only allowed a partial closure of the circular defect.

## 2022-03-11 NOTE — PROGRESS NOTE ADULT - SUBJECTIVE AND OBJECTIVE BOX
HPI:  82 year old male with h/o HTN, HLD, CVA, afib on eliquis s/p ppm, ESRD ( on dialysisi Tues/Thurs/Sat, pt's last dialysis was on Thurs, pt missed his Sat session ) and cholecystitis/gallstone pancreatitis s/p RUQ perc denae drainage presents today biba from dialysis where pt was found unresponsive. Per EMS, pt was found in v tach.Patient was given a total of one shock in the dialysis center on arrival. Pt was also intubated in the field. Patient continued to be in V tach, They administered three more shocks at 200 Joules. They also gave a total of three bicarb, amiodarone 300mg, 3 epi and one amp of calcium chloride with return of pulses. Patient was noted to be down relatively for 10 minutes. Pt was brought in with good pulses, immediately assessed, tube placement verified with chest auscultation and portable cxr, pt hypotensive given IVf and placed on the ventilator (08 Mar 2022 12:37)      24 hr events:      ## Labs:  CBC:                        9.8    10.23 )-----------( 190      ( 11 Mar 2022 03:12 )             30.5     Chem:  03-11    138  |  105  |  24<H>  ----------------------------<  130<H>  3.4<L>   |  27  |  4.70<H>    Ca    8.4<L>      11 Mar 2022 03:12  Phos  2.5     03-11  Mg     2.3     -11    TPro  7.0  /  Alb  2.3<L>  /  TBili  0.8  /  DBili  x   /  AST  51<H>  /  ALT  22  /  AlkPhos  103  03-11    Coags:  PTT - ( 10 Mar 2022 04:49 )  PTT:32.3 sec  culture blood:  --  03-10 @ 09:06            culture sputum:     No growth to date.           culture urine:  -- 03-10 @ 09:06        ## Imaging:    ## Medications:  cefepime   IVPB 1000 milliGRAM(s) IV Intermittent every 24 hours  cefepime   IVPB        norepinephrine Infusion 0.05 MICROgram(s)/kG/Min IV Continuous <Continuous>          pantoprazole  Injectable 40 milliGRAM(s) IV Push daily    acetaminophen     Tablet .. 650 milliGRAM(s) Oral every 6 hours PRN  propofol Infusion 10.692 MICROgram(s)/kG/Min IV Continuous <Continuous>  valproate sodium IVPB 500 milliGRAM(s) IV Intermittent every 8 hours      ## Vitals:  T(C): 37.8 (22 @ 12:30), Max: 39.1 (03-10-22 @ 22:30)  HR: 69 (22 @ 15:30) (69 - 71)  BP: 112/54 (22 @ 15:30) (99/47 - 158/67)  BP(mean): 67 (22 @ 15:30) (56 - 98)  RR: 20 (22 @ 15:30) (11 - 27)  SpO2: 100% (22 @ 15:30) (98% - 100%)  Wt(kg): --  Vent: Mode: AC/ CMV (Assist Control/ Continuous Mandatory Ventilation), RR (machine): 20, RR (patient): 20, TV (machine): 500, FiO2: 25, PEEP: 5, PIP: 25  AB-10 @ 07:  -   @ 07:00  --------------------------------------------------------  IN: 1854.8 mL / OUT: 665 mL / NET: 1189.8 mL     @ 07:01  -   @ 15:56  --------------------------------------------------------  IN: 756.7 mL / OUT: 0 mL / NET: 756.7 mL          ## P/E:  Gen: lying comfortably in bed in no apparent distress  Mouth: (+) ETT  Lungs: Good air entry b/l with coarse BS  Heart: Paced rhythm  Abd: Soft/+BS  Ext:  Neuro: Propofol held; eyelids twitching with upward eye gaze    CENTRAL LINE: [ ] YES [ ] NO  LOCATION:   DATE INSERTED:  REMOVE: [ ] YES [ ] NO      MONDRAGON: [ ] YES [ ] NO    DATE INSERTED:  REMOVE:  [ ] YES [ ] NO      A-LINE:  [ ] YES [ ] NO  LOCATION:   DATE INSERTED:  REMOVE:  [ ] YES [ ] NO  EXPLAIN:    GLOBAL ISSUE/BEST PRACTICE:  Analgesia:  Sedation:  HOB elevation: yes  Stress ulcer prophylaxis:  VTE prophylaxis:  Oral Care:  Glycemic control:  Nutrition:    CODE STATUS: [ ] full code  [ ] DNR  [ ] DNI  [ ] MOLST  Goals of care discussion: [ ] yes  HPI:  Pt is an 83 yo M with h/o A fib, s/p PPM, HTN, HLD, CVA, CKD stage 5, cholecystitis s/p RUQ perc cholecystostomy drain, gallstone pancreatitis presents from HD center s/p cardiac arrest. Per EMS and Nephrologist, pt was found unresponsive at HD prior to initiation of dialysis. CPR started by HD staff and first rhythm check was not a shockable rhythm. 2nd rhythm check AED advised shock and pt was defibrillated. EMS found pt in v tach. Patient was given a total of one shock in the dialysis center on arrival and intubated in the field. Patient continued to be in V tach with reported three more shocks at 200 Joules delivered by EMS. On exam pt with noted myoclonic jerking. ICU admitting dx: 1) V tach arrest with lactic acidosis and NSTEMI vs demand ischemia 2) Acute respiratory failure requiring intubation 3) Anoxic encephalopathy. Today pt noted with abnormal vEEG; EEG fellow called and pt's vEEG c/w status epilepticus.      ## Labs:  CBC:                        9.8    10.23 )-----------( 190      ( 11 Mar 2022 03:12 )             30.5     Chem:  03-    138  |  105  |  24<H>  ----------------------------<  130<H>  3.4<L>   |  27  |  4.70<H>    Ca    8.4<L>      11 Mar 2022 03:12  Phos  2.5     -11  Mg     2.3     -11    TPro  7.0  /  Alb  2.3<L>  /  TBili  0.8  /  DBili  x   /  AST  51<H>  /  ALT  22  /  AlkPhos  103  03-11    Coags:  PTT - ( 10 Mar 2022 04:49 )  PTT:32.3 sec  culture blood:  --  03-10 @ 09:06            culture sputum:     No growth to date.           culture urine:  -- 03-10 @ 09:06        ## Imaging:    ## Medications:  cefepime   IVPB 1000 milliGRAM(s) IV Intermittent every 24 hours  cefepime   IVPB        norepinephrine Infusion 0.05 MICROgram(s)/kG/Min IV Continuous <Continuous>          pantoprazole  Injectable 40 milliGRAM(s) IV Push daily    acetaminophen     Tablet .. 650 milliGRAM(s) Oral every 6 hours PRN  propofol Infusion 10.692 MICROgram(s)/kG/Min IV Continuous <Continuous>  valproate sodium IVPB 500 milliGRAM(s) IV Intermittent every 8 hours      ## Vitals:  T(C): 37.8 (22 @ 12:30), Max: 39.1 (03-10-22 @ 22:30)  HR: 69 (22 @ 15:30) (69 - 71)  BP: 112/54 (22 @ 15:30) (99/47 - 158/67)  BP(mean): 67 (22 @ 15:30) (56 - 98)  RR: 20 (22 @ 15:30) (11 - 27)  SpO2: 100% (22 @ 15:30) (98% - 100%)  Wt(kg): --  Vent: Mode: AC/ CMV (Assist Control/ Continuous Mandatory Ventilation), RR (machine): 20, RR (patient): 20, TV (machine): 500, FiO2: 25, PEEP: 5, PIP: 25  AB-10 @ 07:  -   @ 07:00  --------------------------------------------------------  IN: 1854.8 mL / OUT: 665 mL / NET: 1189.8 mL     @ 07:01  -   @ 15:56  --------------------------------------------------------  IN: 756.7 mL / OUT: 0 mL / NET: 756.7 mL          ## P/E:  Gen: lying comfortably in bed in no apparent distress  Mouth: (+) ETT  Lungs: Good air entry b/l with coarse BS  Heart: Paced rhythm  Abd: Soft/+BS  Ext: No edema  Neuro: Propofol held; eyelids twitching with upward eye gaze/ No response to verbal stimuli    CENTRAL LINE: [ ] YES [ ] NO  LOCATION:   DATE INSERTED:  REMOVE: [ ] YES [ ] NO      MONDRAGON: [ ] YES [ ] NO    DATE INSERTED:  REMOVE:  [ ] YES [ ] NO      A-LINE:  [ ] YES [ ] NO  LOCATION:   DATE INSERTED:  REMOVE:  [ ] YES [ ] NO  EXPLAIN:      CODE STATUS: [ ] full code  [x ] DNR  [ ] DNI  [ ] MOLST  Goals of care discussion: [ ] yes

## 2022-03-11 NOTE — CHART NOTE - NSCHARTNOTEFT_GEN_A_CORE
EEG reviewed until ~1315.     Continuous generalized spike and wave discharges occurring up to 3-4 hz, consistent with status epilepticus.  Suggest titration of sedation to burst suppression.    findings d/w ICU team KAYLI Parks

## 2022-03-11 NOTE — CHART NOTE - NSCHARTNOTEFT_GEN_A_CORE
[facility]    ** PRELIMINARY EEG reviewed until  21:50.    - Continuous generalized spike waves with suppressed background concerning for diffuse cortical injury. This pattern may also fall on Ictal-interictal continuum (IIC).  - Escalation of sedation advised if in line with GOC, however may not change likely dismal prognosis.    d/w ICU staff        Final report to be completed at the completion of the study tomorrow morning.    -----------------------------  Kamran Greenwood MD, CHAPO  Epilepsy Fellow    --------------------------------  EEG Reading Room: [phone]  (weekdays)  On Call Service After Hours: [phone] [facility]    ** PRELIMINARY EEG reviewed until  21:50.    - Continuous generalized spike waves with suppressed background concerning for diffuse cortical injury. This pattern may also fall on Ictal-interictal continuum (IIC).  - Escalation of sedation advised if in line with GOC, however may not change likely dismal prognosis.    d/w ICU FRANCESCA Rodney        Final report to be completed at the completion of the study tomorrow morning.    -----------------------------  Kamran Greenwood MD, CHAPO  Epilepsy Fellow    --------------------------------  EEG Reading Room: [phone]  (weekdays)  On Call Service After Hours: [phone]

## 2022-03-11 NOTE — CONSULT NOTE ADULT - SUBJECTIVE AND OBJECTIVE BOX
[facility] NEPHROLOGY SERVICES, [facility]  NEPHROLOGY AND HYPERTENSION  [address]  [address]  [place] [address]  [phone]    MD BLANCA SCHMIDT MD ANDREY GONCHARUK, MD MADHU KORRAPATI, MD YELENA ROSENBERG, MD BINNY KOSHY, MD CHRISTOPHER CAPUTO, MD EDWARD BOVER, MD      Information from chart:  "Patient is a 82y old  Male who presents with a chief complaint of Vtach, ROSC (08 Mar 2022 12:37)    HPI:  82 year old male with h/o HTN, HLD, CVA, afib on eliquis s/p ppm, ESRD ( on dialysisi Tues/Thurs/Sat, pt's last dialysis was on Thurs, pt missed his Sat session ) and cholecystitis/gallstone pancreatitis s/p RUQ perc denae drainage presents today biba from dialysis where pt was found unresponsive. Per EMS, pt was found in v tach.Patient was given a total of one shock in the dialysis center on arrival. Pt was also intubated in the field. Patient continued to be in V tach, They administered three more shocks at 200 Joules. They also gave a total of three bicarb, amiodarone 300mg, 3 epi and one amp of calcium chloride with return of pulses. Patient was noted to be down relatively for 10 minutes. Pt was brought in with good pulses, immediately assessed, tube placement verified with chest auscultation and portable cxr, pt hypotensive given IVf and placed on the ventilator (08 Mar 2022 12:37)   "  Patient under my care presented to dialysis center for treatment, alert, walking and conversive.   Witnessed change in syncope;   Pulseless; CPR; shock x2 at center 911 to hospital   Missed HD last Sat, runs normal K; 3's, no issues with electrolyte imbalances or fluid overload even though he electively misses treatments 1-2 month.   Recent hospitalization with cholecystostomy tube placement     PAST MEDICAL & SURGICAL HISTORY:  Hypertension    Hyperlipidemia, unspecified hyperlipidemia type    Renal insufficiency    Cerebrovascular accident (CVA)    History of pacemaker    S/P hip replacement  BL ,       FAMILY HISTORY:  No pertinent family history in first degree relatives      Allergies    No Known Allergies    Intolerances      Home Medications:  acetaminophen 500 mg oral tablet: 2 tab(s) orally every 6 hours, As Needed (08 Mar 2022 14:08)  amLODIPine 5 mg oral tablet: 1 tab(s) orally once a day (08 Mar 2022 14:08)  apixaban 2.5 mg oral tablet: 1 tab(s) orally 2 times a day  Please take it starting 2022.   (08 Mar 2022 14:08)  atorvastatin 10 mg oral tablet: 1 tab(s) orally once a day (at bedtime) (08 Mar 2022 14:08)  hydrALAZINE 25 mg oral tablet: 1 tab(s) orally once a day with food (08 Mar 2022 14:08)  isosorbide mononitrate 30 mg oral tablet, extended release: 1 tab(s) orally once a day (in the morning) (08 Mar 2022 14:08)  sodium bicarbonate 650 mg oral tablet: 1 tab(s) orally 2 times a day (08 Mar 2022 14:08)    MEDICATIONS  (STANDING):  cefepime   IVPB      chlorhexidine 0.12% Liquid 15 milliLiter(s) Oral Mucosa every 12 hours  chlorhexidine 2% Cloths 1 Application(s) Topical <User Schedule>  dexMEDEtomidine Infusion 0.2 MICROgram(s)/kG/Hr (4.24 mL/Hr) IV Continuous <Continuous>  pantoprazole  Injectable 40 milliGRAM(s) IV Push daily  propofol Infusion 10.692 MICROgram(s)/kG/Min (5.44 mL/Hr) IV Continuous <Continuous>    MEDICATIONS  (PRN):    Vital Signs Last 24 Hrs  T(C): 35.1 (08 Mar 2022 14:40), Max: 36.3 (08 Mar 2022 13:15)  T(F): 95.2 (08 Mar 2022 14:40), Max: 97.4 (08 Mar 2022 13:15)  HR: 70 (08 Mar 2022 17:19) (69 - 96)  BP: 111/76 (08 Mar 2022 17:00) (80/51 - 148/69)  BP(mean): 85 (08 Mar 2022 17:00) (69 - 89)  RR: 34 (08 Mar 2022 17:00) (16 - 34)  SpO2: 100% (08 Mar 2022 17:19) (73% - 100%)  Mode: AC/ CMV (Assist Control/ Continuous Mandatory Ventilation)  RR (machine): 20  TV (machine): 500  FiO2: 35  PEEP: 5  ITime: 1  MAP: 10  PIP: 26    Daily Height in cm: 185.42 (08 Mar 2022 10:55)    Daily Weight in k.8 (08 Mar 2022 13:44)    22 @ 07:01  -  22 @ 17:27  --------------------------------------------------------  IN: 392.4 mL / OUT: 0 mL / NET: 392.4 mL      CAPILLARY BLOOD GLUCOSE      POCT Blood Glucose.: 94 mg/dL (08 Mar 2022 10:55)    PHYSICAL EXAM:      T(C): 35.1 (22 @ 14:40), Max: 36.3 (22 @ 13:15)  HR: 70 (22 @ 17:19) (69 - 96)  BP: 111/76 (22 @ 17:00) (80/51 - 148/69)  RR: 34 (22 @ 17:00) (16 - 34)  SpO2: 100% (22 @ 17:19) (73% - 100%)  Wt(kg): --  Lungs clear  Heart S1S2  Abd soft NT ND  Extremities:   tr edema  left avf + bruit and thrill                 143  |  109<H>  |  57<H>  ----------------------------<  178<H>  2.9<LL>   |  17<L>  |  9.16<H>    Ca    9.0      08 Mar 2022 13:50  Phos  7.7     08  Mg     2.7     08    TPro  7.4  /  Alb  2.9<L>  /  TBili  0.7  /  DBili  x   /  AST  84<H>  /  ALT  49  /  AlkPhos  129<H>                            9.9    21.11 )-----------( 281      ( 08 Mar 2022 13:50 )             30.4     Creatinine Trend: 9.16<--, 9.26<--, 6.91<--, 8.66<--, 7.53<--, 6.36<--    ABG - ( 08 Mar 2022 17:07 )  pH, Arterial: 7.36  pH, Blood: x     /  pCO2: 25    /  pO2: 326   / HCO3: 14    / Base Excess: -10.0 /  SaO2: 100.0       Assessment   ESRD; post Vtach arrest; ACLS protocol;   ROSC; intubated; elevated WBC     Plan  Hold HD today; replete K IV 10meq x 3, repeat post; keep > 3.5  Empiric abx rx   Discussed with family and PCCM team         Abbe Jerry MD
NEUROLOGY CONSULT    HPI:  81 yo man admitted 3/8/22 from dialysis found to be unresponsive in [facility], s/p cardiac arrest for 10-15, now in post-arrest coma.  Patient reportedly had myoclonic jerking of head and eyes seen by nursing staff.    Head CT: old left temporoparietal stroke, loss of grey-white matter differentiation suggestive of diffuse cerebral edema    EE-4 hz generalized spikes, continuous, with suppressed background activity     NEUROLOGICAL EXAM:  T 99.8 F  /61  HR 69  MS: Comatose, unarousable, unresponsive  CN: Pupils 2-3mm bilaterally, poorly reactive, no oculocephalic response, weak corneal reflexes bilaterally, weak gag reflex, not breathing over ventilator  Motor: normal bulk, normal tone, no spontaneous movements  Sensation: no withdrawal or grimace with noxious stimulation, there is triple flexion responses in the lower extremities which is a spinal cord reflex  Reflexes: trace at biceps, brachioradialis, patella, ankle bilaterally.  mute plantar response bilaterally.  No clonus    Assessment:  81 yo man s/p cardiac arrest with a severe anoxic brain injury.  Head CT reported as demonstrating loss of grey-white matter differentiation suggesting diffuse cerebral edema which is seen after severe cerebral anoxia.  EEG demonstrates continuous continuous generalized spiking with suppressed background activity, which is a pattern seen with myoclonic status epilepticus after cardiac arrest, and is also indicative of a severe anoxic brain injury.  Neurological exam demonstrates weak brainstem reflexes.  There is triple flexion responses in the lower extremities with noxious stimulation which is a spinal cord reflex (not conscious withdrawal).    The above findings suggest a poor prognosis for functional neurological recovery.    Recommendations:  1. Patient loaded with Depacon and started on Propofol, although this may not improve EEG and overall prognosis.  2. Supportive care.    Jerry Foley MD  [facility] Department of Neurology  Epilepsy Center

## 2022-03-11 NOTE — PROGRESS NOTE ADULT - SUBJECTIVE AND OBJECTIVE BOX
[facility] NEPHROLOGY SERVICES, [facility]  NEPHROLOGY AND HYPERTENSION  [address]  [address]  [place] [address]  [phone]    MD BLANCA SCHMIDT, MD MARY LOU DOVER, MD LORRAINE TUTTLE, MD CHRISTIAN BELL, MD TAMY WELDON, MD KARENA RODRIGUEZ MD          Patient events noted    MEDICATIONS  (STANDING):  artificial  tears Solution 1 Drop(s) Both EYES every 6 hours  cefepime   IVPB 1000 milliGRAM(s) IV Intermittent every 24 hours  cefepime   IVPB      chlorhexidine 0.12% Liquid 15 milliLiter(s) Oral Mucosa every 12 hours  chlorhexidine 2% Cloths 1 Application(s) Topical <User Schedule>  midazolam Infusion. 0.05 mG/kG/Hr (4.24 mL/Hr) IV Continuous <Continuous>  norepinephrine Infusion 0.05 MICROgram(s)/kG/Min (7.95 mL/Hr) IV Continuous <Continuous>  pantoprazole  Injectable 40 milliGRAM(s) IV Push daily  propofol Infusion 10.692 MICROgram(s)/kG/Min (5.44 mL/Hr) IV Continuous <Continuous>  valproate sodium IVPB 500 milliGRAM(s) IV Intermittent every 8 hours    MEDICATIONS  (PRN):  acetaminophen     Tablet .. 650 milliGRAM(s) Oral every 6 hours PRN Temp greater or equal to 38.5C (101.3F)      03-10-22 @ 07:01  -  03-11-22 @ 07:00  --------------------------------------------------------  IN: 1854.8 mL / OUT: 665 mL / NET: 1189.8 mL    03-11-22 @ 07:01  -  03-11-22 @ 22:37  --------------------------------------------------------  IN: 1180.8 mL / OUT: 240 mL / NET: 940.8 mL      PHYSICAL EXAM:      T(C): 37.7 (03-11-22 @ 15:30), Max: 38.3 (03-11-22 @ 04:00)  HR: 69 (03-11-22 @ 21:12) (69 - 70)  BP: 116/54 (03-11-22 @ 21:00) (102/39 - 158/67)  RR: 20 (03-11-22 @ 21:00) (11 - 27)  SpO2: 100% (03-11-22 @ 21:12) (98% - 100%)  Wt(kg): --  Lungs clear  Heart S1S2  Abd soft NT ND  Extremities:   tr edema                                    9.8    10.23 )-----------( 190      ( 11 Mar 2022 03:12 )             30.5     03-11    138  |  105  |  24<H>  ----------------------------<  130<H>  3.4<L>   |  27  |  4.70<H>    Ca    8.4<L>      11 Mar 2022 03:12  Phos  2.5     03-11  Mg     2.3     03-11    TPro  7.0  /  Alb  2.3<L>  /  TBili  0.8  /  DBili  x   /  AST  51<H>  /  ALT  22  /  AlkPhos  103  03-11      LIVER FUNCTIONS - ( 11 Mar 2022 03:12 )  Alb: 2.3 g/dL / Pro: 7.0 gm/dL / ALK PHOS: 103 U/L / ALT: 22 U/L / AST: 51 U/L / GGT: x           Creatinine Trend: 4.70<--, 6.29<--, 8.95<--, 7.87<--, 9.34<--, 9.16<--  Mode: AC/ CMV (Assist Control/ Continuous Mandatory Ventilation)  RR (machine): 20  TV (machine): 500  FiO2: 25  PEEP: 5  ITime: 0.7  MAP: 10  PIP: 25      Assessment   ESRD; post cardiac arrest  Suspected anoxic brain injury    Plan:  EEG today  Scheduled HD tomorrow    Abbe Jerry MD

## 2022-03-11 NOTE — PROGRESS NOTE ADULT - ASSESSMENT
Pt is an 81 yo M with h/o A fib, s/p PPM, HTN, HLD, CVA, CKD stage 5, cholecystitis s/p RUQ perc cholecystostomy drain, gallstone pancreatitis presents from HD center s/p cardiac arrest. Per EMS and Nephrologist, pt was found unresponsive at HD prior to initiation of dialysis. CPR started by HD staff and first rhythm check was not a shockable rhythm. 2nd rhythm check AED advised shock and pt was defibrillated. EMS found pt in v tach. Patient was given a total of one shock in the dialysis center on arrival and intubated in the field. Patient continued to be in V tach with reported three more shocks at 200 Joules delivered by EMS. On exam pt with noted myoclonic jerking. ICU admitting dx: 1) V tach arrest with lactic acidosis and NSTEMI vs demand ischemia 2) Acute respiratory failure requiring intubation 3) Anoxic encephalopathy. Today pt noted with abnormal vEEG; EEG fellow called and pt's vEEG c/w status epilepticus.    Resp: Cont current vent settings  ID: For now cont Cefepime for ? PNA  CVS: Levophed to maintain MAP >65/ Trop down trending; would not pursue aggressive cardiac work up given poor MS  HEME: Pt with h/o fib with CVA but also for ?  NSTEMI; AC dc'd due to hematuria/ DVT prophylaxis with venodynes  FEN: Cont enteral feeds  Renal: HD as per Renal  Neuro: Start Valproate and cont Propofol as per Neuro consult  Social: Pt is DNR

## 2022-03-11 NOTE — EEG REPORT - NS EEG TEXT BOX
REPORT OF ROUTINE EEG WITHOUT VIDEO  [facility]: [address], [address] [place] [address], Phone: [phone] [facility]: [address] [place] [address], Phone: [phone] Office: [address] [place] [address], [place] [address], Phone: [phone]  Patient Name: BRITNEY ROSEN Age: 82 year : 1940 MRN #: -, Location: ICU 2 Referring Physician: -  EEG #: [id number] Study Date: 3/11/2022   Start Time: 12:16:49 PM    Study Duration: 23.9 		  Technical Information:					 On Instrument: - Placement and Labeling of Electrodes: The EEG was performed utilizing 20 channels referential EEG connections (coronal over temporal over parasagittal montage) using all standard 10-20 electrode placements with EKG.  Recording was at a sampling rate of 256 samples per second per channel.  Time synchronized digital video recording was done simultaneously with EEG recording.  A low light infrared camera was used for low light recording.  Nils and seizure detection algorithms were utilized. CSA Technical Component: Quantitative EEG analysis using a separate Compressed Spectral Array (CSA) software package was conducted in real-time and run at bedside after set up by the technician, digitally displaying the power of electrographic frequencies included in the 1-30Hz band using a graded color map.  This data was reviewed and interpreted independently, and is reported in a separate section below.  History:  ROUTINE PERFORMED AT BEDSIDED COR: VENTED NO HV DUE TO AGE/ VENTED PHOTIC PERFORMED 81 Y/O MALE P/W POST CARDIAC ARREST/ FROM DIALYSIS DUE TO UNRESPONSIVENESS Hx: HTN, CVA,HLD,AFIB, PPM ESRD, DIALYSIS R/O SEIZURE  Medication Eliquis Aspirin  Study Interpretation:  FINDINGS:  The background was continuous, invariant, and unreactive, and consisted of the ictal epileptiform activity described below. No PDR was seen.   Non-epileptiform activity: None.  Ictal Epileptiform Activity:  Continuous generalized spike and wave discharges occurring up to 3-4 hz, consistent with status epilepticus. Video was not available for review, but per EEG technician notes, patient was noted to have head twitching.  Events: No clinical events were recorded. No seizures were recorded.  Activation Procedures:  -Hyperventilation was not performed.   -Photic stimulation was not performed.  Artifacts: Intermittent myogenic and movement artifacts were noted.  ECG: The heart rate on single channel ECG was predominantly between 70-80 BPM.  EEG Classification / Summary:  -Status epilepticus, generalized, non-convulsive, characterized by continuous generalized spike and wave discharges occurring up to 3-4 hz. Video was not available for review, but per EEG technician notes, patient was noted to have head twitching.  Clinical Impression:  Nonconvulsive status epilepticus of generalized onset. Severe diffuse cerebral dysfunction.  Findings discussed with ICU team.  Preliminary Fellow Report, final report pending attending review  Anthony Ayers MD Epilepsy Fellow  Reading Room: [phone] On Call Service After Hours: [phone]      REPORT OF ROUTINE EEG WITHOUT VIDEO  [facility]: [address], [address] [place] [address], Phone: [phone] [facility]: [address] [place] [address], Phone: [phone] Office: [address] [place] [address], [place] [address], Phone: [phone]  Patient Name: BRITNEY ROSEN Age: 82 year : 1940 MRN #: -, Location: ICU 2 Referring Physician: -  EEG #: [id number] Study Date: 3/11/2022   Start Time: 12:16:49 PM    Study Duration: 23.9 		  Technical Information:					 On Instrument: - Placement and Labeling of Electrodes: The EEG was performed utilizing 20 channels referential EEG connections (coronal over temporal over parasagittal montage) using all standard 10-20 electrode placements with EKG.  Recording was at a sampling rate of 256 samples per second per channel.  Time synchronized digital video recording was done simultaneously with EEG recording.  A low light infrared camera was used for low light recording.  Nisl and seizure detection algorithms were utilized. CSA Technical Component: Quantitative EEG analysis using a separate Compressed Spectral Array (CSA) software package was conducted in real-time and run at bedside after set up by the technician, digitally displaying the power of electrographic frequencies included in the 1-30Hz band using a graded color map.  This data was reviewed and interpreted independently, and is reported in a separate section below.  History:  ROUTINE PERFORMED AT BEDSIDED COR: VENTED NO HV DUE TO AGE/ VENTED PHOTIC PERFORMED 83 Y/O MALE P/W POST CARDIAC ARREST/ FROM DIALYSIS DUE TO UNRESPONSIVENESS Hx: HTN, CVA,HLD,AFIB, PPM ESRD, DIALYSIS R/O SEIZURE  Medication Eliquis Aspirin  Study Interpretation:  FINDINGS:  The background was invariant and unreactive, and consisted of the ictal epileptiform activity described below. No PDR was seen.   Non-epileptiform activity: None.  Ictal Epileptiform Activity:  Continuous periodic generalized spike and wave discharges occurring up to 3-4 hz on an invariant and unreactive background  Events: No clinical events were recorded. No seizures were recorded.  Activation Procedures:  -Hyperventilation was not performed.   -Photic stimulation was not performed.  Artifacts: Intermittent myogenic and movement artifacts were noted.  ECG: The heart rate on single channel ECG was predominantly between 70-80 BPM.  EEG Classification / Summary: -Continuous periodic discharges, generalized, occurring up to 3-4 hz on an invariant and unreactive background  Clinical Impression:  Severe diffuse cerebral dysfunction - there is debate among epileptologists whether this condition represents potentially reversible "status epilepticus" or instead is a biomarker of severe brain injury. In either case, prognostically, this finding portends a poor outcome.  Findings discussed with ICU team.  Anthony Ayers MD Epilepsy Fellow  Reading Room: [phone] On Call Service After Hours: [phone]      REPORT OF ROUTINE EEG WITHOUT VIDEO  [facility]: [address], [address] [place] [address], Phone: [phone] [facility]: [address] [place] [address], Phone: [phone] Office: [address] [place] [address], [place] [address], Phone: [phone]  Patient Name: BRITNEY ROSEN Age: 82 year : 1940 MRN #: -, Location: ICU 2 Referring Physician: -  EEG #: [id number] Study Date: 3/11/2022   Start Time: 12:16:49 PM    Study Duration: 23.9 		  Technical Information:					 On Instrument: - Placement and Labeling of Electrodes: The EEG was performed utilizing 20 channels referential EEG connections (coronal over temporal over parasagittal montage) using all standard 10-20 electrode placements with EKG.  Recording was at a sampling rate of 256 samples per second per channel.  Time synchronized digital video recording was done simultaneously with EEG recording.  A low light infrared camera was used for low light recording.  Nils and seizure detection algorithms were utilized. CSA Technical Component: Quantitative EEG analysis using a separate Compressed Spectral Array (CSA) software package was conducted in real-time and run at bedside after set up by the technician, digitally displaying the power of electrographic frequencies included in the 1-30Hz band using a graded color map.  This data was reviewed and interpreted independently, and is reported in a separate section below.  History:  ROUTINE PERFORMED AT BEDSIDED COR: VENTED NO HV DUE TO AGE/ VENTED PHOTIC PERFORMED 83 Y/O MALE P/W POST CARDIAC ARREST/ FROM DIALYSIS DUE TO UNRESPONSIVENESS Hx: HTN, CVA,HLD,AFIB, PPM ESRD, DIALYSIS R/O SEIZURE  Medication Eliquis Aspirin  Study Interpretation:  FINDINGS:  The background was invariant and unreactive, and consisted of the ictal epileptiform activity described below. No PDR was seen.   Non-epileptiform activity: None.  Ictal Epileptiform Activity:  Continuous periodic generalized spike and wave discharges occurring up to 3-4 hz on an invariant and unreactive background  Events: No clinical events were recorded. No seizures were recorded.  Activation Procedures:  -Hyperventilation was not performed.   -Photic stimulation was not performed.  Artifacts: Intermittent myogenic and movement artifacts were noted.  ECG: The heart rate on single channel ECG was predominantly between 70-80 BPM.  EEG Classification / Summary: -Continuous periodic discharges, generalized, occurring up to 3-4 hz on an invariant and unreactive background  Clinical Impression:  Severe diffuse cerebral dysfunction - there is debate among epileptologists whether this condition represents potentially reversible "status epilepticus" or instead is a biomarker of severe brain injury. In either case, prognostically, this finding portends a poor outcome.  Findings discussed with ICU team.  Anthony Ayers MD Epilepsy Fellow  Dm Viveros MD PhD Director, Epilepsy Division, [facility] EEG Reading Room Ph#: [phone] Epilepsy Answering Service after 5PM and before 8:30AM: Ph#: [phone]

## 2022-03-12 NOTE — PROGRESS NOTE ADULT - SUBJECTIVE AND OBJECTIVE BOX
HPI:  Pt is an 83 yo M with h/o A fib, s/p PPM, HTN, HLD, CVA, CKD stage 5, cholecystitis s/p RUQ perc cholecystostomy drain, gallstone pancreatitis presents from HD center s/p cardiac arrest. Per EMS and Nephrologist, pt was found unresponsive at HD prior to initiation of dialysis. CPR started by HD staff and first rhythm check was not a shockable rhythm. 2nd rhythm check AED advised shock and pt was defibrillated. EMS found pt in v tach. Patient was given a total of one shock in the dialysis center on arrival and intubated in the field. Patient continued to be in V tach with reported three more shocks at 200 Joules delivered by EMS. On exam pt with noted myoclonic jerking. ICU admitting dx: 1) V tach arrest with lactic acidosis and NSTEMI vs demand ischemia 2) Acute respiratory failure requiring intubation 3) Anoxic encephalopathy. On 3/11 pt noted with abnormal vEEG; EEG fellow called and pt's vEEG c/w status epilepticus.      ## Labs:  CBC:                        8.8    7.58  )-----------( 145      ( 12 Mar 2022 02:45 )             27.1     Chem:      140  |  106  |  42<H>  ----------------------------<  100<H>  3.3<L>   |  23  |  5.88<H>    Ca    8.3<L>      12 Mar 2022 02:45  Phos  3.6     12  Mg     2.5         TPro  7.0  /  Alb  2.3<L>  /  TBili  0.8  /  DBili  x   /  AST  51<H>  /  ALT  22  /  AlkPhos  103  -11    Coags:    culture blood:  --   @ 09:12            culture sputum:     Normal Respiratory Daisy present           culture urine:  --  @ 09:12        ## Imaging:    ## Medications:  cefepime   IVPB 1000 milliGRAM(s) IV Intermittent every 24 hours  cefepime   IVPB        norepinephrine Infusion 0.05 MICROgram(s)/kG/Min IV Continuous <Continuous>          pantoprazole  Injectable 40 milliGRAM(s) IV Push daily    acetaminophen     Tablet .. 650 milliGRAM(s) Oral every 6 hours PRN  midazolam Infusion. 0.07 mG/kG/Hr IV Continuous <Continuous>  propofol Infusion. 60 MICROgram(s)/kG/Min IV Continuous <Continuous>  valproate sodium IVPB 500 milliGRAM(s) IV Intermittent every 8 hours      ## Vitals:  T(C): 35.9 (22 @ 14:35), Max: 37.5 (22 @ 23:00)  HR: 69 (22 @ 15:00) (69 - 70)  BP: 134/74 (22 @ 15:00) (72/46 - 162/64)  BP(mean): 89 (22 @ 15:00) (52 - 91)  RR: 20 (22 @ 15:00) (18 - 20)  SpO2: 100% (22 @ 15:00) (98% - 100%)  Wt(kg): --  Vent: Mode: AC/ CMV (Assist Control/ Continuous Mandatory Ventilation), RR (machine): 20, RR (patient): 20, TV (machine): 500, FiO2: 25, PEEP: 5, PIP: 27  AB-11 @ 07:01  -   @ 07:00  --------------------------------------------------------  IN: 1943.2 mL / OUT: 370 mL / NET: 1573.2 mL     @ 06:01  -   @ 15:40  --------------------------------------------------------  IN: 808.1 mL / OUT: 300 mL / NET: 508.1 mL          ## P/E:  Gen: lying comfortably in bed in no apparent distress  Mouth: (+) ETT  Lungs: Good air entry b/l with coarse BS  Heart: Paced rhythm  Abd: Soft/+BS  Ext: No edema/ L forearm (+) bruits  Neuro: Pupils 2mm/ Weak cough reflex/ No response to painful stimuli    CENTRAL LINE: [ ] YES [ ] NO  LOCATION:   DATE INSERTED:  REMOVE: [ ] YES [ ] NO      MONDRAGON: [ ] YES [ ] NO    DATE INSERTED:  REMOVE:  [ ] YES [ ] NO      A-LINE:  [ ] YES [ ] NO  LOCATION:   DATE INSERTED:  REMOVE:  [ ] YES [ ] NO  EXPLAIN:      CODE STATUS: [ ] full code  [x ] DNR  [ ] DNI  [ ] MOLST  Goals of care discussion: [x ] yes

## 2022-03-12 NOTE — EEG REPORT - NS EEG TEXT BOX
[facility]: [address], [address] [place] [address], Phone: [phone]  [facility]: [address], [place] [address], Phone: [phone]  Office: [address] [place] [address], [place] [address], [place] [address], Phone: [phone]    Patient Name: BRITNEY ROSEN  Age: 82 year  : 1940  MRN #: -, Location: ICU 2  Referring Physician: -    Study Date: 3/11/2022   Start Time: 12:57-08:00     Study Duration: 19hr  		    Medication  Eliquis, VPA, propofol, versed added  Aspirin    Study Interpretation:    FINDINGS:  The background was invariant and unreactive, and consisted of epileptiform activity described below. No PDR was seen.     Non-epileptiform activity: None.    Ictal Epileptiform Activity:   Continuous periodic generalized spike and wave discharges occurring up to 3-4 hz on an invariant and unreactive background  Some polyspike bursts to 7hz x <1sec    After 00:14 GPDs near 2hz with 1 sec discontinuity, occasional bursts accelerating to 3-4hz, discharges in bursts up to 7hz <1sec  After 06:30 GPDs to 1-2hz with 1-4 sec discontinuity appearing more interictal    Events:  No clinical events were recorded.    Activation Procedures:   -Hyperventilation was not performed.    -Photic stimulation was not performed.    Artifacts:  Intermittent myogenic and movement artifacts were noted.    ECG:  The heart rate on single channel ECG was predominantly between 70-80 BPM.    EEG Classification / Summary:  -Continuous periodic discharges, generalized, mostly near  3-4 hz on an invariant and unreactive background concerning for ictal continuum/status epilepticus.  -After 00:14 some improvement with addition of versed GTT  -EEG more interictal appearing with GPDs slowing near 1-2hz after 06:30    Clinical Impression:    Status epilepticus in earlier recording, with GPDs slowing in rate overnight and early morning appearing more interictal by morning with additional sedation    Cristian Romero MD FAFLOWER  Director, Continuous EEG Monitoring Program, [facility] and [facility]   and Epilepsy Fellowship ,   Department of Neurology, [facility] EEG Reading Room Ph#: [phone]  Epilepsy Answering Service after 5PM and before 8:30AM: Ph#: [phone]

## 2022-03-12 NOTE — PROGRESS NOTE ADULT - SUBJECTIVE AND OBJECTIVE BOX
[facility] NEPHROLOGY SERVICES, [facility]  NEPHROLOGY AND HYPERTENSION  [address]  [address]  [place] [address]  [phone]    MD BLANCA SCHMIDT, MD MARY LOU DOVER, MD LORRAINE TUTTLE, MD CHRISTIAN BELL, MD TAMY WELDON, MD KARENA RODRIGUEZ MD          Patient events noted    MEDICATIONS  (STANDING):  artificial  tears Solution 1 Drop(s) Both EYES every 6 hours  cefepime   IVPB 1000 milliGRAM(s) IV Intermittent every 24 hours  cefepime   IVPB      chlorhexidine 0.12% Liquid 15 milliLiter(s) Oral Mucosa every 12 hours  chlorhexidine 2% Cloths 1 Application(s) Topical <User Schedule>  midazolam Infusion. 0.07 mG/kG/Hr (5.94 mL/Hr) IV Continuous <Continuous>  norepinephrine Infusion 0.05 MICROgram(s)/kG/Min (7.95 mL/Hr) IV Continuous <Continuous>  pantoprazole  Injectable 40 milliGRAM(s) IV Push daily  propofol Infusion. 60 MICROgram(s)/kG/Min (30.5 mL/Hr) IV Continuous <Continuous>  valproate sodium IVPB 500 milliGRAM(s) IV Intermittent every 8 hours    MEDICATIONS  (PRN):  acetaminophen     Tablet .. 650 milliGRAM(s) Oral every 6 hours PRN Temp greater or equal to 38.5C (101.3F)      03-11-22 @ 07:01  -  03-12-22 @ 07:00  --------------------------------------------------------  IN: 1943.2 mL / OUT: 370 mL / NET: 1573.2 mL    03-12-22 @ 06:01  -  03-12-22 @ 22:32  --------------------------------------------------------  IN: 1523.2 mL / OUT: 585 mL / NET: 938.2 mL      PHYSICAL EXAM:      T(C): 36.7 (03-12-22 @ 19:47), Max: 37.5 (03-11-22 @ 23:00)  HR: 69 (03-12-22 @ 22:00) (69 - 85)  BP: 115/63 (03-12-22 @ 22:00) (72/46 - 162/64)  RR: 20 (03-12-22 @ 22:00) (14 - 26)  SpO2: 100% (03-12-22 @ 22:00) (98% - 100%)  Wt(kg): --  Lungs clear  Heart S1S2  Abd soft NT ND  Extremities:   tr edema                                    8.8    7.58  )-----------( 145      ( 12 Mar 2022 02:45 )             27.1     03-12    140  |  106  |  42<H>  ----------------------------<  100<H>  3.3<L>   |  23  |  5.88<H>    Ca    8.3<L>      12 Mar 2022 02:45  Phos  3.6     03-12  Mg     2.5     03-12    TPro  7.0  /  Alb  2.3<L>  /  TBili  0.8  /  DBili  x   /  AST  51<H>  /  ALT  22  /  AlkPhos  103  03-11      LIVER FUNCTIONS - ( 11 Mar 2022 03:12 )  Alb: 2.3 g/dL / Pro: 7.0 gm/dL / ALK PHOS: 103 U/L / ALT: 22 U/L / AST: 51 U/L / GGT: x           Creatinine Trend: 5.88<--, 4.70<--, 6.29<--, 8.95<--, 7.87<--, 9.34<--  Mode: AC/ CMV (Assist Control/ Continuous Mandatory Ventilation)  RR (machine): 20  TV (machine): 500  FiO2: 25  PEEP: 5  ITime: 0.9  MAP: 11  PIP: 26      Assessment   ESRD; cardiac arrest  Brain anoxia    Plan:    HD for today  UF as tolerated   Will follow   Abbe Jerry MD

## 2022-03-12 NOTE — PROGRESS NOTE ADULT - SUBJECTIVE AND OBJECTIVE BOX
HPI:  83 yo man admitted 3/8/22 from dialysis found to be unresponsive in VTach, s/p cardiac arrest for 10-15, now in post-arrest coma.  Patient reportedly had myoclonic jerking of head and eyes seen by nursing staff.    Head CT: old left temporoparietal stroke, loss of grey-white matter differentiation suggestive of diffuse cerebral edema    EE-4 hz generalized spikes, continuous, with suppressed background activity  EEG Classification / Summary:  -Continuous periodic discharges, generalized, mostly near  3-4 hz on an invariant and unreactive background concerning for ictal continuum/status epilepticus.  -After 00:14 some improvement with addition of versed GTT  -EEG more interictal appearing with GPDs slowing near 1-2hz after 06:30    Clinical Impression:    Status epilepticus in earlier recording, with GPDs slowing in rate overnight and early morning appearing more interictal by morning with additional sedation       MEDICATIONS:    acetaminophen     Tablet .. 650 milliGRAM(s) Oral every 6 hours PRN  artificial  tears Solution 1 Drop(s) Both EYES every 6 hours  cefepime   IVPB 1000 milliGRAM(s) IV Intermittent every 24 hours  cefepime   IVPB      chlorhexidine 0.12% Liquid 15 milliLiter(s) Oral Mucosa every 12 hours  chlorhexidine 2% Cloths 1 Application(s) Topical <User Schedule>  midazolam Infusion. 0.07 mG/kG/Hr IV Continuous <Continuous>  norepinephrine Infusion 0.05 MICROgram(s)/kG/Min IV Continuous <Continuous>  pantoprazole  Injectable 40 milliGRAM(s) IV Push daily  propofol Infusion. 60 MICROgram(s)/kG/Min IV Continuous <Continuous>  valproate sodium IVPB 500 milliGRAM(s) IV Intermittent every 8 hours      LABS:                          8.8    7.58  )-----------( 145      ( 12 Mar 2022 02:45 )             27.1     03-12    140  |  106  |  42<H>  ----------------------------<  100<H>  3.3<L>   |  23  |  5.88<H>    Ca    8.3<L>      12 Mar 2022 02:45  Phos  3.6     03-12  Mg     2.5     03-12    TPro  7.0  /  Alb  2.3<L>  /  TBili  0.8  /  DBili  x   /  AST  51<H>  /  ALT  22  /  AlkPhos  103  03-11    CAPILLARY BLOOD GLUCOSE          Urinalysis Basic - ( 11 Mar 2022 04:23 )    Color: Red / Appearance: bloody / S.010 / pH: x  Gluc: x / Ketone: Trace  / Bili: Negative / Urobili: Negative mg/dL   Blood: x / Protein: 500 mg/dL / Nitrite: Positive   Leuk Esterase: Small / RBC: >50 /HPF / WBC 6-10   Sq Epi: x / Non Sq Epi: Few / Bacteria: Many      I&O's Summary    11 Mar 2022 07:01  -  12 Mar 2022 07:00  --------------------------------------------------------  IN: 1943.2 mL / OUT: 370 mL / NET: 1573.2 mL    12 Mar 2022 06:01  -  12 Mar 2022 22:21  --------------------------------------------------------  IN: 1523.2 mL / OUT: 585 mL / NET: 938.2 mL      Vital Signs Last 24 Hrs  T(C): 36.7 (12 Mar 2022 19:47), Max: 37.5 (11 Mar 2022 23:00)  T(F): 98 (12 Mar 2022 19:47), Max: 99.5 (11 Mar 2022 23:00)  HR: 69 (12 Mar 2022 22:00) (69 - 85)  BP: 115/63 (12 Mar 2022 22:00) (72/46 - 162/64)  BP(mean): 76 (12 Mar 2022 22:00) (52 - 91)  RR: 20 (12 Mar 2022 22:00) (14 - 26)  SpO2: 100% (12 Mar 2022 22:00) (98% - 100%)    NEUROLOGICAL EXAM:    MS: Comatose, unarousable, unresponsive  CN: Pupils 2-3mm bilaterally, poorly reactive, no oculocephalic response, no corneal reflexes bilaterally, no gag reflex, not breathing over ventilator  Motor: normal bulk, normal tone, no spontaneous movements  Sensation: no withdrawal or grimace with noxious stimulation, there is triple flexion responses in the lower extremities which is a spinal cord reflex  Reflexes: none

## 2022-03-12 NOTE — PROGRESS NOTE ADULT - ASSESSMENT
Pt is an 83 yo M with h/o A fib, s/p PPM, HTN, HLD, CVA, CKD stage 5, cholecystitis s/p RUQ perc cholecystostomy drain, gallstone pancreatitis presents from HD center s/p cardiac arrest. Per EMS and Nephrologist, pt was found unresponsive at HD prior to initiation of dialysis. CPR started by HD staff and first rhythm check was not a shockable rhythm. 2nd rhythm check AED advised shock and pt was defibrillated. EMS found pt in v tach. Patient was given a total of one shock in the dialysis center on arrival and intubated in the field. Patient continued to be in V tach with reported three more shocks at 200 Joules delivered by EMS. On exam pt with noted myoclonic jerking. ICU admitting dx: 1) V tach arrest with lactic acidosis and NSTEMI vs demand ischemia 2) Acute respiratory failure requiring intubation 3) Anoxic encephalopathy. On 3/11 pt noted with abnormal vEEG; EEG fellow called and pt's vEEG c/w status epilepticus.    Resp: Cont current vent settings  ID: Cefepime for ? PNA 5-7 days  CVS: Levophed to maintain MAP >65/ Trop down trending; would not pursue aggressive cardiac work up given poor MS  HEME: Pt with h/o fib with CVA but also for ? NSTEMI; AC dc'd due to hematuria/ DVT prophylaxis with Venodynes  FEN: Cont enteral feeds  Renal: HD as per Renal  Social/Neuro: CT head on 3/8/2022: Slight global reduction in gray-white matter differentiation, concerning for developing/early cerebral edema. Redemonstration of chronic left parietotemporal lobe infarct and pt's vEEG c/w poor neurologic outcome/ Cont Valproate, Propofol and Versed/ F/u as per Neuro/ Family at bedside mother, daughter and . Daughter has stated father has been debilitated since prior stroke. I explained pt with poor neurologic outcome and they may begin to consider palliative extubation vs trach/ Pt is DNR

## 2022-03-12 NOTE — PROGRESS NOTE ADULT - ASSESSMENT
83 yo man s/p cardiac arrest with a severe anoxic brain injury.  Head CT reported as demonstrating loss of grey-white matter differentiation suggesting diffuse cerebral edema which is seen after severe cerebral anoxia.  EEG as above now interictal with suppression  Neurological exam demonstrates weak brainstem reflexes.  There is triple flexion responses in the lower extremities with noxious stimulation which is a spinal cord reflex (not conscious withdrawal).    The above findings suggest a poor prognosis for functional neurological recovery.    Recommendations:  1. Patient loaded with Depacon and started on Propofol and Versed, although this may not improve EEG and overall prognosis.  2. GOCs

## 2022-03-13 NOTE — PROGRESS NOTE ADULT - SUBJECTIVE AND OBJECTIVE BOX
[facility] NEPHROLOGY SERVICES, [facility]  NEPHROLOGY AND HYPERTENSION  [address]  [address]  [place] [address]  [phone]    MD BLANCA SCHMIDT, MD MARY LOU DOVER, MD LORRAINE TUTTLE, MD CHRISTIAN BELL, MD TAMY WELDON, MD KARENA RODRIGUEZ MD          Patient events noted    MEDICATIONS  (STANDING):  artificial  tears Solution 1 Drop(s) Both EYES every 6 hours  cefepime   IVPB 1000 milliGRAM(s) IV Intermittent every 24 hours  cefepime   IVPB      chlorhexidine 0.12% Liquid 15 milliLiter(s) Oral Mucosa every 12 hours  chlorhexidine 2% Cloths 1 Application(s) Topical <User Schedule>  midazolam Infusion. 0.07 mG/kG/Hr (5.94 mL/Hr) IV Continuous <Continuous>  midodrine 10 milliGRAM(s) Oral every 8 hours  norepinephrine Infusion 0.05 MICROgram(s)/kG/Min (7.95 mL/Hr) IV Continuous <Continuous>  pantoprazole  Injectable 40 milliGRAM(s) IV Push daily  propofol Infusion. 60 MICROgram(s)/kG/Min (30.5 mL/Hr) IV Continuous <Continuous>  valproate sodium IVPB 500 milliGRAM(s) IV Intermittent every 8 hours    MEDICATIONS  (PRN):  acetaminophen     Tablet .. 650 milliGRAM(s) Oral every 6 hours PRN Temp greater or equal to 38.5C (101.3F)      03-12-22 @ 06:01 - 03-13-22 @ 07:00  --------------------------------------------------------  IN: 2187 mL / OUT: 910 mL / NET: 1277 mL    03-13-22 @ 07:01 - 03-13-22 @ 20:15  --------------------------------------------------------  IN: 1091.5 mL / OUT: 500 mL / NET: 591.5 mL      PHYSICAL EXAM:      T(C): 37.6 (03-13-22 @ 19:33), Max: 37.8 (03-13-22 @ 12:30)  HR: 69 (03-13-22 @ 19:30) (69 - 75)  BP: 96/55 (03-13-22 @ 19:30) (77/63 - 136/62)  RR: 20 (03-13-22 @ 19:30) (15 - 23)  SpO2: 100% (03-13-22 @ 19:30) (91% - 100%)  Wt(kg): --  Lungs clear  Heart S1S2  Abd soft NT ND  Extremities:   tr edema                                    10.5   9.65  )-----------( 188      ( 13 Mar 2022 08:49 )             32.6     03-13    141  |  108  |  34<H>  ----------------------------<  102<H>  3.8   |  23  |  4.12<H>    Ca    7.6<L>      13 Mar 2022 06:44  Phos  3.6     03-13  Mg     2.5     03-13    TPro  6.3  /  Alb  2.0<L>  /  TBili  0.5  /  DBili  x   /  AST  38<H>  /  ALT  10<L>  /  AlkPhos  75  03-13      LIVER FUNCTIONS - ( 13 Mar 2022 06:44 )  Alb: 2.0 g/dL / Pro: 6.3 gm/dL / ALK PHOS: 75 U/L / ALT: 10 U/L / AST: 38 U/L / GGT: x           Creatinine Trend: 4.12<--, 5.88<--, 4.70<--, 6.29<--, 8.95<--, 7.87<--  Mode: AC/ CMV (Assist Control/ Continuous Mandatory Ventilation)  RR (machine): 20  TV (machine): 500  FiO2: 25  PEEP: 5  ITime: 0.9  MAP: 10  PIP: 26      Assessment   ESRD, post cardiac arrest  Suspected brain anoxia    Plan:  Considering terminal weaning 2-3 days  Discussed with family at bedside      Abbe Jerry MD

## 2022-03-13 NOTE — PROGRESS NOTE ADULT - SUBJECTIVE AND OBJECTIVE BOX
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  EEG without seizures but GPDs  remains intubated    ## ROS:  [x ] unable to obtain due to anoxia, intubation      ## Labs:  CBC:                       10.5   9.65  )-----------( 188      ( 13 Mar 2022 08:49 )             32.6     03-13    141  |  108  |  34<H>  ----------------------------<  102<H>  3.8   |  23  |  4.12<H>    Ca    7.6<L>      13 Mar 2022 06:44  Phos  3.6     03-13  Mg     2.5     03-13    TPro  6.3  /  Alb  2.0<L>  /  TBili  0.5  /  DBili  x   /  AST  38<H>  /  ALT  10<L>  /  AlkPhos  75  03-13        ## Imaging:  EEG   -Continuous GPDs near 1-2hz, discontinuity  GPDs more prominent after 17:43 near 2-2.5hz, slightly improved again in regards to frequency after 22:00    Clinical Impression:  The pattern of semi-rhythmic or periodic generalized sharply contoured slowing is typically seen in the setting of a severe encephalopathy, and may represent a highly epileptogenic pattern.      ## Medications:  cefepime   IVPB 1000 milliGRAM(s) IV Intermittent every 24 hours    midodrine 10 milliGRAM(s) Oral every 8 hours  norepinephrine Infusion 0.05 MICROgram(s)/kG/Min IV Continuous <Continuous>      pantoprazole  Injectable 40 milliGRAM(s) IV Push daily    acetaminophen     Tablet .. 650 milliGRAM(s) Oral every 6 hours PRN  midazolam Infusion. 0.07 mG/kG/Hr IV Continuous <Continuous>  propofol Infusion. 60 MICROgram(s)/kG/Min IV Continuous <Continuous>  valproate sodium IVPB 500 milliGRAM(s) IV Intermittent every 8 hours      ## Vitals:  T(C): 37.6 (03-13-22 @ 19:33), Max: 37.8 (03-13-22 @ 12:30)  HR: 69 (03-13-22 @ 19:30) (69 - 75)  BP: 96/55 (03-13-22 @ 19:30) (77/63 - 136/62)  RR: 20 (03-13-22 @ 19:30) (15 - 23)  SpO2: 100% (03-13-22 @ 19:30) (91% - 100%)      Vent: Mode: AC/ CMV (Assist Control/ Continuous Mandatory Ventilation), RR (machine): 20, RR (patient): 20, TV (machine): 500, FiO2: 21, PEEP: 5, PIP: 22        ## P/E:  Gen: unresponsive, less twitching and jerking (myoclonus)  HEENT: ETT in place  Resp: CTA B/L , mechanical breath sounds  CVS: RRR  Abd: soft ND +BS  Ext: no c/c/e  Neuro: unresponsive, sedated      GLOBAL ISSUE/BEST PRACTICE:  Analgesia: n/a  Sedation: versed/propofol  HOB elevation: yes  Stress ulcer prophylaxis: protonix  VTE prophylaxis: bilateral compression devices  Oral Care: yes  Glycemic control: yes  Nutrition: tube feeds    CODE STATUS: [ ] full code  [x ] DNR  [ ] DNI  [x ] [facility] [place]  Goals of care discussion: [x ] yes

## 2022-03-13 NOTE — EEG REPORT - NS EEG TEXT BOX
[facility]: [address] [place] [address], Phone: [phone]  [facility]: [address] [address], Phone: [phone]  Office: [address] [place] [address], [place] [address], [place] [address], Phone: [phone]    Patient Name: BRITNEY ROSEN  Age: 82 year  : 1940  MRN #: -, Location: ICU 2  Referring Physician: -    Study Date: 3/12/2022   Start Time: 08:00-08:00  to 3/13/2022     Study Duration: 24hr  		  MEDICATIONS  (STANDING):  artificial  tears Solution 1 Drop(s) Both EYES every 6 hours  cefepime   IVPB 1000 milliGRAM(s) IV Intermittent every 24 hours  cefepime   IVPB      chlorhexidine 0.12% Liquid 15 milliLiter(s) Oral Mucosa every 12 hours  chlorhexidine 2% Cloths 1 Application(s) Topical <User Schedule>  midazolam Infusion. 0.07 mG/kG/Hr (5.94 mL/Hr) IV Continuous <Continuous>  norepinephrine Infusion 0.05 MICROgram(s)/kG/Min (7.95 mL/Hr) IV Continuous <Continuous>  pantoprazole  Injectable 40 milliGRAM(s) IV Push daily  propofol Infusion. 60 MICROgram(s)/kG/Min (30.5 mL/Hr) IV Continuous <Continuous>  valproate sodium IVPB 500 milliGRAM(s) IV Intermittent every 8 hours      Study Interpretation:    FINDINGS:  The background was invariant and unreactive, discontinuous, and consisted of epileptiform activity described below. Some bursts of nonsustained theta/delta x 1-3 sec  No PDR was seen.  No sleep transients.    Non-epileptiform activity: None.    Ictal Epileptiform Activity:   Continuous periodic generalized spike and wave discharges occurring near to 1-2hz with 1-6 sec discontinuity appearing more interictal  GPDs more prominent after 17:43 near 2-2.5hz, slightly improved again in regards to frequency after 22:00    Events:  No clinical events were recorded.    Activation Procedures:   -Hyperventilation was not performed.    -Photic stimulation was not performed.    Artifacts:  Intermittent myogenic and movement artifacts were noted.    ECG:  The heart rate on single channel ECG was predominantly between 70-80 BPM.    EEG Classification / Summary:  -Continuous GPDs near 1-2hz, discontinuity  GPDs more prominent after 17:43 near 2-2.5hz, slightly improved again in regards to frequency after 22:00    Clinical Impression:  The pattern of semi-rhythmic or periodic generalized sharply contoured slowing is typically seen in the setting of a severe encephalopathy, and may represent a highly epileptogenic pattern.    MD MADISON Collier  Director, Continuous EEG Monitoring Program, [facility]   and Epilepsy Fellowship ,   Department of Neurology, [facility]    [facility] EEG Reading Room Ph#: [phone]  Epilepsy Answering Service after 5PM and before 8:30AM: Ph#: [phone]

## 2022-03-13 NOTE — PROGRESS NOTE ADULT - CONVERSATION DETAILS
Pt is s/p cardiac arrest    Has had poor mental status since arrest    GPDs on EEG    on levophed    had been failing weaning trials prior to being placed on propofol and versed for suppression of myoclonus status epilepticus    wife states pt would not have wanted to be supported on vent indefinitely and that he had made his wishes known to them. she personally does not want to see him suffer and "be a vegetable"    wife states she knows what she needs to do and he will have to be taken off mechanical vent support in the upcoming days. she would like to talk to the rest of the family in terms of when they want to transition care for comfort

## 2022-03-13 NOTE — PROGRESS NOTE ADULT - ASSESSMENT
82M PMH HTN, HLD, CVA, afib on eliquis, s/p ppm, ESRD with L arm AVF (on HD Tues/Thurs/Sat, last dialysis was on Thurs, pt missed his Sat session ), cholecystitis s/p RUQ perc cholecystostomy drain, gallstone pancreatitis presents from HD center s/p cardiac arrest. Per EMS and nephrologist, pt was found unresponsive at HD prior to initiation of dialysis. CPR started by HD staff and first rhythm check was not a shockable rhythm. 2nd rhythm check AED advised shock and pt was defibrillated. EMS found pt in v tach. Patient was given a total of one shock in the dialysis center on arrival and intubated in the field. Patient continued to be in V tach with reported three more shocks at 200 Joules delivered by EMS. Pt also received three bicarb, amiodarone 300mg, 3 epi and one amp of calcium chloride with return of pulses. Unknown exact down time. L tibial IO inserted. Arrived to ED with good pulses and stable vitals. Labs with hypokalemia, Cr 9, BUN 50s, lactate 6.7, trop: 890. On exam with noted myoclonic jerking. Initiated on heparin drip for NSTEMI. Developed hematuria    DX: cardiac arrest with ROSC, VT arrest, NSTEMI, respiratory arrest, acute respiratory failure requiring intubation, PNA, ESRD on HD, hematuria, anoxic encephalopathy with myoclonus, hypokalemia, metabolic / lactic acidosis    NEURO  mental status has been poor overall  EEGs without significant changes on/off propofol/versed  will d/c 24 hr EEG  cont with valproate, propofol, versed for now  post arrest myoclonus with GPDs overall with poor neurologic prognosis    CV  s/p defibrillation x4 in the field  s/p amio in the field  currently on monitor pt in a paced rhythm  heparin drip was started for underlying hx of a-fib with CVA but also for NSTEMI; however AC d/jesus due to hematuria  ASA not initiated due to hematuria  still requiring levophed for BP support  POCUS with mildly reduced EF  official 2D echo with EF 45-50%, grade III diastolic dysfunction, LA/RA enlargement, mod TR, mild AS  on midodrine in attempt to wean off levo    PULM  mechanical vent support  failed weaning trials  now sedated with propofol/versed  on cefepime for PNA      RENAL  HD per renal  appreciate renal follow up    ID  on empiric cefepime for PNA, opacities noted at bases on CXR  s/p vanco x1  all cultures negative to date   can d/c antibiotics after today's dose      GI  tube feeds  perc denae drain in place: bilious output draining    GEN  prognosis poor  spoke to wife today and pt's condition discussed with her  she is aware neurologic recovery is poor  she states that she does not want him to suffer and would not want prolonged ventilation/trach as those would not be his wishes  family likely to pursue comfort measures with elective extubation in the upcoming days  she wants to go home and discuss with rest of family when they should proceed with transition of care  would not escalate care if pt decompensates  DNR

## 2022-03-14 NOTE — PROGRESS NOTE ADULT - ASSESSMENT
Pt is an 83 yo M with h/o A fib, s/p PPM, HTN, HLD, CVA, CKD stage 5, p/w cardiac arrest from V tach, AHRF and anoxic damage. COurse noted for SE from anoxic damage.    Overall prognosis remains very poor given severity of anoxic damage. Pt DNR. Will d/w family regarding palliative extubation      Resp:   Cont current vent settings        CVS:   Levophed to maintain MAP >65  nstemi in setting of cardiac arrest    HEME:   AF w/ cva hx but AC dc'd due to hematuria   DVT prophylaxis with Venodynes      Renal:   HD as per Renal     Neuro:   CTH w/ developing/early cerebral edema.  EEG today w/ no active sz- will d/c eeg  Cont Valproate  if no further sz will d/w Neuro  regarding  Propofol and Versed gtt

## 2022-03-14 NOTE — PROGRESS NOTE ADULT - SUBJECTIVE AND OBJECTIVE BOX
HPI:  83 yo man admitted 3/8/22 from dialysis found to be unresponsive in VTach, s/p cardiac arrest for 10-15, now in post-arrest coma.  Patient reportedly had myoclonic jerking of head and eyes seen by nursing staff.    Head CT: old left temporoparietal stroke, loss of grey-white matter differentiation suggestive of diffuse cerebral edema    EE-4 hz generalized spikes, continuous, with suppressed background activity  EEG Classification / Summary:  -Continuous periodic discharges, generalized, mostly near  3-4 hz on an invariant and unreactive background concerning for ictal continuum/status epilepticus.  -After 00:14 some improvement with addition of versed GTT  -EEG more interictal appearing with GPDs slowing near 1-2hz after 06:30    Clinical Impression:    Status epilepticus in earlier recording, with GPDs slowing in rate overnight and early morning appearing more interictal by morning with additional sedation   MEDICATIONS:    acetaminophen     Tablet .. 650 milliGRAM(s) Oral every 6 hours PRN  artificial  tears Solution 1 Drop(s) Both EYES every 6 hours  chlorhexidine 0.12% Liquid 15 milliLiter(s) Oral Mucosa every 12 hours  chlorhexidine 2% Cloths 1 Application(s) Topical <User Schedule>  metoclopramide Injectable 5 milliGRAM(s) IV Push every 8 hours  midazolam Infusion. 0.07 mG/kG/Hr IV Continuous <Continuous>  midodrine 10 milliGRAM(s) Oral every 8 hours  norepinephrine Infusion 0.05 MICROgram(s)/kG/Min IV Continuous <Continuous>  pantoprazole  Injectable 40 milliGRAM(s) IV Push daily  polyethylene glycol 3350 17 Gram(s) Oral daily  propofol Infusion. 60 MICROgram(s)/kG/Min IV Continuous <Continuous>  senna 2 Tablet(s) Oral at bedtime  valproate sodium IVPB 500 milliGRAM(s) IV Intermittent every 8 hours      LABS:                          10.4   9.66  )-----------( 187      ( 14 Mar 2022 03:06 )             32.1     03-14    137  |  105  |  55<H>  ----------------------------<  99  4.1   |  21<L>  |  5.52<H>    Ca    8.3<L>      14 Mar 2022 03:06  Phos  6.8     03-14  Mg     2.8     03-14    TPro  6.9  /  Alb  2.1<L>  /  TBili  0.8  /  DBili  x   /  AST  34  /  ALT  10<L>  /  AlkPhos  83      CAPILLARY BLOOD GLUCOSE            I&O's Summary    13 Mar 2022 07:01  -  14 Mar 2022 07:00  --------------------------------------------------------  IN: 2314.6 mL / OUT: 1000 mL / NET: 1314.6 mL    14 Mar 2022 07:01  -  14 Mar 2022 11:08  --------------------------------------------------------  IN: 225.9 mL / OUT: 0 mL / NET: 225.9 mL      Vital Signs Last 24 Hrs  T(C): 36.8 (14 Mar 2022 08:00), Max: 37.9 (13 Mar 2022 23:00)  T(F): 98.3 (14 Mar 2022 08:00), Max: 100.2 (13 Mar 2022 23:00)  HR: 69 (14 Mar 2022 10:00) (69 - 76)  BP: 109/64 (14 Mar 2022 10:00) (73/43 - 132/64)  BP(mean): 75 (14 Mar 2022 10:00) (49 - 81)  RR: 20 (14 Mar 2022 10:00) (16 - 20)  SpO2: 99% (14 Mar 2022 10:00) (92% - 100%)    NEUROLOGICAL EXAM:    MS: Comatose, unarousable, unresponsive  CN: Pupils 2-3mm bilaterally, poorly reactive, no oculocephalic response, no corneal reflexes bilaterally, no gag reflex, not breathing over ventilator  Motor: normal bulk, normal tone, no spontaneous movements  Sensation: no withdrawal or grimace with noxious stimulation, there is triple flexion responses in the lower extremities which is a spinal cord reflex  Reflexes: none

## 2022-03-14 NOTE — PROGRESS NOTE ADULT - ASSESSMENT
83 yo man s/p cardiac arrest with a severe anoxic brain injury.  Head CT reported as demonstrating loss of grey-white matter differentiation suggesting diffuse cerebral edema which is seen after severe cerebral anoxia.  EEG as above now interictal with suppression  Neurological exam demonstrates weak brainstem reflexes.  There is triple flexion responses in the lower extremities with noxious stimulation which is a spinal cord reflex (not conscious withdrawal).    The above findings suggest a very poor prognosis for functional neurological recovery.    Recommendations:  1. Patient loaded with Depacon and started on Propofol and Versed, although this may not improve EEG and overall prognosis.  2. GOCs. Family wishes to pursue comfort measures

## 2022-03-14 NOTE — EEG REPORT - NS EEG TEXT BOX
[facility]: [address] [place] [address], Phone: [phone]  [facility]: [address] [place] [address], Phone: [phone]  Office: [address] [place] [address], [place] [address], [place] [address], Phone: [phone]    Patient Name: BRITNEY ROSEN  Age: 82 year  : 1940  MRN #: -, Location: ICU 2  Referring Physician: -    Study Date: 3/13/2022   Start Time: 08:00-08:00  to 3/14/2022     Study Duration: 24hr  		  MEDICATIONS  (STANDING):  artificial  tears Solution 1 Drop(s) Both EYES every 6 hours  cefepime   IVPB 1000 milliGRAM(s) IV Intermittent every 24 hours  cefepime   IVPB      chlorhexidine 0.12% Liquid 15 milliLiter(s) Oral Mucosa every 12 hours  chlorhexidine 2% Cloths 1 Application(s) Topical <User Schedule>  midazolam Infusion. 0.07 mG/kG/Hr (5.94 mL/Hr) IV Continuous <Continuous>  norepinephrine Infusion 0.05 MICROgram(s)/kG/Min (7.95 mL/Hr) IV Continuous <Continuous>  pantoprazole  Injectable 40 milliGRAM(s) IV Push daily  propofol Infusion. 60 MICROgram(s)/kG/Min (30.5 mL/Hr) IV Continuous <Continuous>  valproate sodium IVPB 500 milliGRAM(s) IV Intermittent every 8 hours      Study Interpretation:    FINDINGS:  The background was invariant and unreactive, discontinuous, and consisted of epileptiform activity described below. Some bursts of nonsustained theta/delta x 1-5 sec  No PDR was seen.  No sleep transients.    Non-epileptiform activity: None.    Ictal Epileptiform Activity:   Continuous periodic generalized spike and wave discharges occurring near to 1-2hz, at times close to 2-2.5 hz, with 3-20 sec discontinuity appearing more interictal. Between ~0345 to ~0445,  frequency of GPDs increased to up to 4 hz    Events:  No clinical events were recorded.    Activation Procedures:   -Hyperventilation was not performed.    -Photic stimulation was not performed.    Artifacts:  Intermittent myogenic and movement artifacts were noted.    ECG:  The heart rate on single channel ECG was predominantly between 70-80 BPM.    EEG Classification / Summary:  -Continuous GPDs near 1-2hz, at times close to 2-2.5 hz, discontinuity    Clinical Impression:  The pattern of semi-rhythmic or periodic generalized sharply contoured slowing is typically seen in the setting of a severe encephalopathy, and may represent a highly epileptogenic pattern.    Preliminary Fellow Report, final report pending attending review    Anthony Ayers MD  Epilepsy Fellow    Reading Room: [phone]  On Call Service After Hours: [phone]    [facility]: [address] [facility] [place] [address], Phone: [phone]  [facility]: [address] [address], Phone: [phone]  Office: [address] [place] [address], [place] [address], [place] [address], Phone: [phone]    Patient Name: BRITNEY ROSEN  Age: 82 year  : 1940  MRN #: -, Location: ICU 2  Referring Physician: -    Study Date: 3/13/2022   Start Time: 08:00-08:00  to 3/14/2022     Study Duration: 24hr  		  MEDICATIONS  (STANDING):  artificial  tears Solution 1 Drop(s) Both EYES every 6 hours  cefepime   IVPB 1000 milliGRAM(s) IV Intermittent every 24 hours  midazolam Infusion. 0.07 mG/kG/Hr (5.94 mL/Hr) IV Continuous <Continuous>  norepinephrine Infusion 0.05 MICROgram(s)/kG/Min (7.95 mL/Hr) IV Continuous <Continuous>  pantoprazole  Injectable 40 milliGRAM(s) IV Push daily  propofol Infusion. 60 MICROgram(s)/kG/Min (30.5 mL/Hr) IV Continuous <Continuous>  valproate sodium IVPB 500 milliGRAM(s) IV Intermittent every 8 hours      Study Interpretation:    FINDINGS:  The background was invariant and unreactive, discontinuous, and consisted of epileptiform activity described below. Some bursts of nonsustained theta/delta x 1-5 sec  No PDR was seen.  No sleep transients.    Non-epileptiform activity: None.    Ictal Epileptiform Activity:   Continuous periodic generalized spike and wave discharges occurring near to 1-2hz, at times close to 2-2.5 hz, with 3-20 sec discontinuity appearing more interictal. Between ~0345 to ~0445,  frequency of GPDs increased to up to 4 hz    Events:  No clinical events were recorded.    Activation Procedures:   -Hyperventilation was not performed.    -Photic stimulation was not performed.    Artifacts:  Intermittent myogenic and movement artifacts were noted.    ECG:  The heart rate on single channel ECG was predominantly between 70-80 BPM.    EEG Classification / Summary:  -Continuous GPDs near 1-2hz, at times close to 2-2.5 hz, discontinuity    Clinical Impression:  The pattern of semi-rhythmic or periodic generalized sharply contoured slowing is typically seen in the setting of a severe encephalopathy, and may represent an increased risk for seizure      Anthony Ayers MD  Epilepsy Fellow    Reading Room: [phone]  On Call Service After Hours: [phone]

## 2022-03-14 NOTE — PROGRESS NOTE ADULT - SUBJECTIVE AND OBJECTIVE BOX
HPI:  Pt is an 81 yo M with h/o A fib, s/p PPM, HTN, HLD, CVA, CKD stage 5, cholecystitis s/p RUQ perc cholecystostomy drain, gallstone pancreatitis presents from HD center s/p cardiac arrest. Per EMS and Nephrologist, pt was found unresponsive at HD prior to initiation of dialysis. CPR started by HD staff and first rhythm check was not a shockable rhythm. 2nd rhythm check AED advised shock and pt was defibrillated. EMS found pt in v tach. Patient was given a total of one shock in the dialysis center on arrival and intubated in the field. Patient continued to be in V tach with reported three more shocks at 200 Joules delivered by EMS. On exam pt with noted myoclonic jerking. ICU admitting dx: 1) V tach arrest with lactic acidosis and NSTEMI vs demand ischemia 2) Acute respiratory failure requiring intubation 3) Anoxic encephalopathy. On 3/11 pt noted with abnormal vEEG; EEG fellow called and pt's vEEG c/w status epilepticus.        24h events;  no sz - eeg d/c      ROS: unable due to encephalopathy    ## Labs:                         10.4   9.66  )-----------( 187      ( 14 Mar 2022 03:06 )             32.1     03-14    137  |  105  |  55<H>  ----------------------------<  99  4.1   |  21<L>  |  5.52<H>    Ca    8.3<L>      14 Mar 2022 03:06  Phos  6.8     03-14  Mg     2.8     03-14    TPro  6.9  /  Alb  2.1<L>  /  TBili  0.8  /  DBili  x   /  AST  34  /  ALT  10<L>  /  AlkPhos  83  03-14            ICU Vital Signs Last 24 Hrs  T(C): 36.9 (14 Mar 2022 16:00), Max: 37.9 (13 Mar 2022 23:00)  T(F): 98.4 (14 Mar 2022 16:00), Max: 100.2 (13 Mar 2022 23:00)  HR: 69 (14 Mar 2022 20:00) (69 - 76)  BP: 101/58 (14 Mar 2022 20:00) (73/43 - 132/64)  BP(mean): 69 (14 Mar 2022 20:00) (49 - 83)  ABP: --  ABP(mean): --  RR: 20 (14 Mar 2022 20:00) (16 - 21)  SpO2: 96% (14 Mar 2022 20:00) (92% - 100%)      ## P/E:  Gen: intubated, nad  Mouth: (+) ETT  Lungs: Good air entry b/l with coarse BS  Heart: Paced rhythm no murmur  Abd: Soft/+BS  Ext: No edema/ L forearm (+) bruits  Neuro: Pupils 2mm/ no gag/cough reflex/ No response to painful stimuli, +corneal     MEDICATIONS  (STANDING):  artificial  tears Solution 1 Drop(s) Both EYES every 6 hours  chlorhexidine 0.12% Liquid 15 milliLiter(s) Oral Mucosa every 12 hours  chlorhexidine 2% Cloths 1 Application(s) Topical <User Schedule>  metoclopramide Injectable 5 milliGRAM(s) IV Push every 8 hours  midazolam Infusion. 0.07 mG/kG/Hr (5.94 mL/Hr) IV Continuous <Continuous>  midodrine 10 milliGRAM(s) Oral every 8 hours  norepinephrine Infusion 0.05 MICROgram(s)/kG/Min (7.95 mL/Hr) IV Continuous <Continuous>  pantoprazole  Injectable 40 milliGRAM(s) IV Push daily  polyethylene glycol 3350 17 Gram(s) Oral daily  propofol Infusion. 60 MICROgram(s)/kG/Min (30.5 mL/Hr) IV Continuous <Continuous>  senna 2 Tablet(s) Oral at bedtime  valproate sodium IVPB 500 milliGRAM(s) IV Intermittent every 8 hours

## 2022-03-14 NOTE — PROGRESS NOTE ADULT - SUBJECTIVE AND OBJECTIVE BOX
[facility] NEPHROLOGY SERVICES, [facility]  NEPHROLOGY AND HYPERTENSION  [address]  [address]  [place] [address]  [phone]    MD BLANCA SCHMIDT, MD LORRAINE WILLINGHAM MD YELENA ROSENBERG, MD TAMY WELDON, MD KARENA RODRIGUEZ MD          Patient events noted    MEDICATIONS  (STANDING):  artificial  tears Solution 1 Drop(s) Both EYES every 6 hours  chlorhexidine 0.12% Liquid 15 milliLiter(s) Oral Mucosa every 12 hours  chlorhexidine 2% Cloths 1 Application(s) Topical <User Schedule>  metoclopramide Injectable 5 milliGRAM(s) IV Push every 8 hours  midazolam Infusion. 0.07 mG/kG/Hr (5.94 mL/Hr) IV Continuous <Continuous>  midodrine 10 milliGRAM(s) Oral every 8 hours  norepinephrine Infusion 0.05 MICROgram(s)/kG/Min (7.95 mL/Hr) IV Continuous <Continuous>  pantoprazole  Injectable 40 milliGRAM(s) IV Push daily  polyethylene glycol 3350 17 Gram(s) Oral daily  propofol Infusion. 60 MICROgram(s)/kG/Min (30.5 mL/Hr) IV Continuous <Continuous>  senna 2 Tablet(s) Oral at bedtime  valproate sodium IVPB 500 milliGRAM(s) IV Intermittent every 8 hours    MEDICATIONS  (PRN):  acetaminophen     Tablet .. 650 milliGRAM(s) Oral every 6 hours PRN Temp greater or equal to 38.5C (101.3F)      03-13-22 @ 07:01  -  03-14-22 @ 07:00  --------------------------------------------------------  IN: 2314.6 mL / OUT: 1000 mL / NET: 1314.6 mL    03-14-22 @ 07:01  -  03-14-22 @ 22:07  --------------------------------------------------------  IN: 1661.6 mL / OUT: 410 mL / NET: 1251.6 mL      PHYSICAL EXAM:      T(C): 36.9 (03-14-22 @ 16:00), Max: 37.9 (03-13-22 @ 23:00)  HR: 69 (03-14-22 @ 22:00) (69 - 76)  BP: 112/63 (03-14-22 @ 22:00) (73/43 - 132/64)  RR: 20 (03-14-22 @ 22:00) (16 - 23)  SpO2: 98% (03-14-22 @ 22:00) (92% - 100%)  Wt(kg): --  Lungs clear  Heart S1S2  Abd soft NT ND  Extremities:   tr edema                                    10.4   9.66  )-----------( 187      ( 14 Mar 2022 03:06 )             32.1     03-14    137  |  105  |  55<H>  ----------------------------<  99  4.1   |  21<L>  |  5.52<H>    Ca    8.3<L>      14 Mar 2022 03:06  Phos  6.8     03-14  Mg     2.8     03-14    TPro  6.9  /  Alb  2.1<L>  /  TBili  0.8  /  DBili  x   /  AST  34  /  ALT  10<L>  /  AlkPhos  83  03-14      LIVER FUNCTIONS - ( 14 Mar 2022 03:06 )  Alb: 2.1 g/dL / Pro: 6.9 gm/dL / ALK PHOS: 83 U/L / ALT: 10 U/L / AST: 34 U/L / GGT: x           Creatinine Trend: 5.52<--, 4.12<--, 5.88<--, 4.70<--, 6.29<--, 8.95<--  Mode: AC/ CMV (Assist Control/ Continuous Mandatory Ventilation)  RR (machine): 20  TV (machine): 500  FiO2: 21  PEEP: 5  ITime: 0.9  MAP: 10  PIP: 23      Assessment   ESRD; post cardiac arrest   Suspected anoxic brain injury    Plan:  Supportive care;       Abbe Jerry MD

## 2022-03-15 NOTE — PROGRESS NOTE ADULT - SUBJECTIVE AND OBJECTIVE BOX
[facility] NEPHROLOGY SERVICES, [facility]  NEPHROLOGY AND HYPERTENSION  [address]  [address]  [place] [address]  [phone]    MD BLANCA SCHMIDT, MD LORRAINE WILLINGHAM MD YELENA ROSENBERG, MD TAMY WELDON, MD KARENA RODRIGUEZ MD          Patient events noted    MEDICATIONS  (STANDING):  artificial  tears Solution 1 Drop(s) Both EYES every 6 hours  chlorhexidine 0.12% Liquid 15 milliLiter(s) Oral Mucosa every 12 hours  chlorhexidine 2% Cloths 1 Application(s) Topical <User Schedule>  metoclopramide Injectable 5 milliGRAM(s) IV Push every 8 hours  midazolam Infusion. 0.07 mG/kG/Hr (5.94 mL/Hr) IV Continuous <Continuous>  midodrine 10 milliGRAM(s) Oral every 8 hours  norepinephrine Infusion 0.05 MICROgram(s)/kG/Min (7.95 mL/Hr) IV Continuous <Continuous>  pantoprazole  Injectable 40 milliGRAM(s) IV Push daily  polyethylene glycol 3350 17 Gram(s) Oral daily  propofol Infusion. 60 MICROgram(s)/kG/Min (30.5 mL/Hr) IV Continuous <Continuous>  senna 2 Tablet(s) Oral at bedtime  valproate sodium IVPB 500 milliGRAM(s) IV Intermittent every 8 hours    MEDICATIONS  (PRN):  acetaminophen     Tablet .. 650 milliGRAM(s) Oral every 6 hours PRN Temp greater or equal to 38.5C (101.3F)      03-14-22 @ 07:01  -  03-15-22 @ 07:00  --------------------------------------------------------  IN: 2324.2 mL / OUT: 1220 mL / NET: 1104.2 mL    03-15-22 @ 07:01  -  03-15-22 @ 21:19  --------------------------------------------------------  IN: 822.6 mL / OUT: 1120 mL / NET: -297.4 mL      PHYSICAL EXAM:      T(C): 37.1 (03-15-22 @ 19:30), Max: 37.1 (03-15-22 @ 19:30)  HR: 69 (03-15-22 @ 20:00) (69 - 70)  BP: 103/53 (03-15-22 @ 20:00) (79/48 - 128/63)  RR: 20 (03-15-22 @ 20:00) (17 - 23)  SpO2: 97% (03-15-22 @ 20:00) (94% - 100%)  Wt(kg): --  Lungs clear  Heart S1S2  Abd soft NT ND  Extremities:   tr edema                                    10.5   9.99  )-----------( 174      ( 15 Mar 2022 02:54 )             32.9     03-15    136  |  105  |  72<H>  ----------------------------<  97  4.5   |  16<L>  |  6.36<H>    Ca    8.2<L>      15 Mar 2022 02:54  Phos  8.7     03-15  Mg     2.9     03-15    TPro  6.9  /  Alb  2.0<L>  /  TBili  1.0  /  DBili  x   /  AST  50<H>  /  ALT  16  /  AlkPhos  90  03-15      LIVER FUNCTIONS - ( 15 Mar 2022 02:54 )  Alb: 2.0 g/dL / Pro: 6.9 gm/dL / ALK PHOS: 90 U/L / ALT: 16 U/L / AST: 50 U/L / GGT: x           Creatinine Trend: 6.36<--, 5.52<--, 4.12<--, 5.88<--, 4.70<--, 6.29<--  Mode: AC/ CMV (Assist Control/ Continuous Mandatory Ventilation)  RR (machine): 20  TV (machine): 500  FiO2: 25  PEEP: 5  ITime: 1  MAP: 10  PIP: 22      Assessment   ESRD; post cardiac arrest  Anoxic brain injury    Plan:  Withdraw hemodialysis   Terminal wean tomorrow    Abbe Jerry MD

## 2022-03-15 NOTE — PROGRESS NOTE ADULT - ASSESSMENT
Pt is an 83 yo M with h/o A fib, s/p PPM, HTN, HLD, CVA, CKD stage 5, p/w cardiac arrest from V tach, AHRF and anoxic damage. COurse noted for SE from anoxic damage.    Overall prognosis remains very poor given severity of anoxic damage. Pt DNR. Will d/w family regarding palliative extubation      Resp:   Cont current vent settings        CVS:   Levophed to maintain MAP >65  nstemi in setting of cardiac arrest      HEME:   AF w/ cva hx but AC dc'd due to hematuria   DVT prophylaxis with Venodynes      Renal:   HD as per Renal      Neuro  CTH w/ developing/early cerebral edema.  had prior status epilepticus but most recent EEG no active sz   Cont Valproate  will start decreasing Propofol and Versed gtt as tolerated

## 2022-03-15 NOTE — PROGRESS NOTE ADULT - SUBJECTIVE AND OBJECTIVE BOX
3 HPI:  Pt is an 83 yo M with h/o A fib, s/p PPM, HTN, HLD, CVA, CKD stage 5, cholecystitis s/p RUQ perc cholecystostomy drain, gallstone pancreatitis presents from HD center s/p cardiac arrest. Per EMS and Nephrologist, pt was found unresponsive at HD prior to initiation of dialysis. CPR started by HD staff and first rhythm check was not a shockable rhythm. 2nd rhythm check AED advised shock and pt was defibrillated. EMS found pt in v tach. Patient was given a total of one shock in the dialysis center on arrival and intubated in the field. Patient continued to be in V tach with reported three more shocks at 200 Joules delivered by EMS. On exam pt with noted myoclonic jerking. ICU admitting dx: 1) V tach arrest with lactic acidosis and NSTEMI vs demand ischemia 2) Acute respiratory failure requiring intubation 3) Anoxic encephalopathy. On 3/11 pt noted with abnormal vEEG; EEG fellow called and pt's vEEG c/w status epilepticus.        24h events;  no sz on last EEG      ROS: unable due to encephalopathy    ## Labs:                                10.5   9.99  )-----------( 174      ( 15 Mar 2022 02:54 )             32.9     03-15    136  |  105  |  72<H>  ----------------------------<  97  4.5   |  16<L>  |  6.36<H>    Ca    8.2<L>      15 Mar 2022 02:54  Phos  8.7     03-15  Mg     2.9     03-15    TPro  6.9  /  Alb  2.0<L>  /  TBili  1.0  /  DBili  x   /  AST  50<H>  /  ALT  16  /  AlkPhos  90  03-15       ICU Vital Signs Last 24 Hrs  T(C): 36.7 (15 Mar 2022 07:24), Max: 36.9 (14 Mar 2022 16:00)  T(F): 98.1 (15 Mar 2022 07:24), Max: 98.4 (14 Mar 2022 16:00)  HR: 70 (15 Mar 2022 12:05) (69 - 70)  BP: 92/55 (15 Mar 2022 12:00) (83/53 - 123/70)  BP(mean): 64 (15 Mar 2022 12:00) (58 - 83)  ABP: --  ABP(mean): --  RR: 20 (15 Mar 2022 12:00) (16 - 23)  SpO2: 100% (15 Mar 2022 12:05) (94% - 100%)        ## P/E:  Gen: intubated, nad  Mouth: (+) ETT  Lungs: Good air entry b/l with coarse BS  Heart: Paced rhythm no murmur  Abd: Soft/+BS  Ext: No edema/ L forearm (+) bruits  Neuro: Pupils 2mm/ no gag/cough reflex/ No response to painful stimuli, +corneal    MEDICATIONS  (STANDING):  artificial  tears Solution 1 Drop(s) Both EYES every 6 hours  chlorhexidine 0.12% Liquid 15 milliLiter(s) Oral Mucosa every 12 hours  chlorhexidine 2% Cloths 1 Application(s) Topical <User Schedule>  metoclopramide Injectable 5 milliGRAM(s) IV Push every 8 hours  midazolam Infusion. 0.07 mG/kG/Hr (5.94 mL/Hr) IV Continuous <Continuous>  midodrine 10 milliGRAM(s) Oral every 8 hours  norepinephrine Infusion 0.05 MICROgram(s)/kG/Min (7.95 mL/Hr) IV Continuous <Continuous>  pantoprazole  Injectable 40 milliGRAM(s) IV Push daily  polyethylene glycol 3350 17 Gram(s) Oral daily  propofol Infusion. 60 MICROgram(s)/kG/Min (30.5 mL/Hr) IV Continuous <Continuous>  senna 2 Tablet(s) Oral at bedtime  valproate sodium IVPB 500 milliGRAM(s) IV Intermittent every 8 hours

## 2022-03-16 NOTE — PROVIDER CONTACT NOTE (EICU) - ACTION/TREATMENT ORDERED:
dilaudid 1mg IVP q3 hrs as needed for SOB, pain  ativan 1mg IVP q3 hours as needed for agitation/anxiety  robinol as needed for secretions dilaudid 1mg IVP q3 hrs as needed for SOB, pain  ativan 2mg IVP q3 hours as needed for agitation/anxiety  robinol as needed for secretions

## 2022-03-16 NOTE — PROVIDER CONTACT NOTE (EICU) - SITUATION
pt extubated. on comfort measures
pt admitted s/p cardiac arrest 3/8. family has decided on elective extubation with transition to comfort care

## 2022-03-16 NOTE — PROGRESS NOTE ADULT - REASON FOR ADMISSION
Vtach, cardiac arrest, ROSC

## 2022-03-16 NOTE — PROGRESS NOTE ADULT - ASSESSMENT
Pt is an 81 yo M with h/o A fib, s/p PPM, HTN, HLD, CVA, CKD stage 5, p/w cardiac arrest from V tach, AHRF and anoxic damage. COurse noted for SE from anoxic damage.      Overall prognosis remains very poor given severity of anoxic damage. Pt DNR and now for terminal extubation          extubate to NC  prn opiod for pain and SOB control  cont AED to prevent sz    family at beside and agree with plan

## 2022-03-16 NOTE — PROGRESS NOTE ADULT - SUBJECTIVE AND OBJECTIVE BOX
HPI:  Pt is an 81 yo M with h/o A fib, s/p PPM, HTN, HLD, CVA, CKD stage 5, cholecystitis s/p RUQ perc cholecystostomy drain, gallstone pancreatitis presents from HD center s/p cardiac arrest. Per EMS and Nephrologist, pt was found unresponsive at HD prior to initiation of dialysis. CPR started by HD staff and first rhythm check was not a shockable rhythm. 2nd rhythm check AED advised shock and pt was defibrillated. EMS found pt in v tach. Patient was given a total of one shock in the dialysis center on arrival and intubated in the field. Patient continued to be in V tach with reported three more shocks at 200 Joules delivered by EMS. On exam pt with noted myoclonic jerking. ICU admitting dx: 1) V tach arrest with lactic acidosis and NSTEMI vs demand ischemia 2) Acute respiratory failure requiring intubation 3) Anoxic encephalopathy. On 3/11 pt noted with abnormal vEEG; EEG fellow called and pt's vEEG c/w status epilepticus.        24h events;  Pt planned for terminal extubation this AM      ROS: unable due to encephalopathy    ## Labs:                          10.5   9.99  )-----------( 174      ( 15 Mar 2022 02:54 )             32.9     03-15    136  |  105  |  72<H>  ----------------------------<  97  4.5   |  16<L>  |  6.36<H>    Ca    8.2<L>      15 Mar 2022 02:54  Phos  8.7     03-15  Mg     2.9     03-15    TPro  6.9  /  Alb  2.0<L>  /  TBili  1.0  /  DBili  x   /  AST  50<H>  /  ALT  16  /  AlkPhos  90  03-15         ICU Vital Signs Last 24 Hrs  T(C): 37.1 (16 Mar 2022 07:30), Max: 37.7 (16 Mar 2022 04:00)  T(F): 98.8 (16 Mar 2022 07:30), Max: 99.9 (16 Mar 2022 04:00)  HR: 69 (16 Mar 2022 11:25) (69 - 70)  BP: 85/47 (16 Mar 2022 11:25) (79/48 - 128/63)  BP(mean): 56 (16 Mar 2022 11:25) (56 - 79)  ABP: --  ABP(mean): --  RR: 22 (16 Mar 2022 11:25) (13 - 27)  SpO2: 100% (16 Mar 2022 11:25) (94% - 100%)      ## P/E:  Gen: intubated, nad  Mouth: (+) ETT  Lungs: Good air entry b/l with coarse BS  Heart: Paced rhythm no murmur  Abd: Soft/+BS  Ext: No edema/ L forearm (+) bruits  Neuro: Pupils 2mm/ no gag/cough reflex/ No response to painful stimuli, +corneal    MEDICATIONS  (STANDING):  artificial  tears Solution 1 Drop(s) Both EYES every 6 hours  chlorhexidine 0.12% Liquid 15 milliLiter(s) Oral Mucosa every 12 hours  chlorhexidine 2% Cloths 1 Application(s) Topical <User Schedule>  metoclopramide Injectable 5 milliGRAM(s) IV Push every 8 hours  midazolam Infusion. 0.07 mG/kG/Hr (5.94 mL/Hr) IV Continuous <Continuous>  midodrine 10 milliGRAM(s) Oral every 8 hours  norepinephrine Infusion 0.05 MICROgram(s)/kG/Min (7.95 mL/Hr) IV Continuous <Continuous>  pantoprazole  Injectable 40 milliGRAM(s) IV Push daily  polyethylene glycol 3350 17 Gram(s) Oral daily  propofol Infusion. 60 MICROgram(s)/kG/Min (30.5 mL/Hr) IV Continuous <Continuous>  senna 2 Tablet(s) Oral at bedtime  valproate sodium IVPB 500 milliGRAM(s) IV Intermittent every 8 hours

## 2022-03-16 NOTE — PROGRESS NOTE ADULT - SUBJECTIVE AND OBJECTIVE BOX
HPI:  81 yo man admitted 3/8/22 from dialysis found to be unresponsive in VTach, s/p cardiac arrest for 10-15, now in post-arrest coma.  Patient reportedly had myoclonic jerking of head and eyes seen by nursing staff.    Head CT: old left temporoparietal stroke, loss of grey-white matter differentiation suggestive of diffuse cerebral edema    EE-4 hz generalized spikes, continuous, with suppressed background activity  EEG Classification / Summary:  -Continuous periodic discharges, generalized, mostly near  3-4 hz on an invariant and unreactive background concerning for ictal continuum/status epilepticus.  -After 00:14 some improvement with addition of versed GTT  -EEG more interictal appearing with GPDs slowing near 1-2hz after 06:30    Clinical Impression:    Status epilepticus in earlier recording, with GPDs slowing in rate overnight and early morning appearing more interictal by morning with additional sedation       MEDICATIONS:    acetaminophen     Tablet .. 650 milliGRAM(s) Oral every 6 hours PRN  artificial  tears Solution 1 Drop(s) Both EYES every 6 hours  chlorhexidine 0.12% Liquid 15 milliLiter(s) Oral Mucosa every 12 hours  chlorhexidine 2% Cloths 1 Application(s) Topical <User Schedule>  metoclopramide Injectable 5 milliGRAM(s) IV Push every 8 hours  midazolam Infusion. 0.07 mG/kG/Hr IV Continuous <Continuous>  midodrine 10 milliGRAM(s) Oral every 8 hours  norepinephrine Infusion 0.05 MICROgram(s)/kG/Min IV Continuous <Continuous>  pantoprazole  Injectable 40 milliGRAM(s) IV Push daily  polyethylene glycol 3350 17 Gram(s) Oral daily  propofol Infusion. 60 MICROgram(s)/kG/Min IV Continuous <Continuous>  senna 2 Tablet(s) Oral at bedtime  valproate sodium IVPB 500 milliGRAM(s) IV Intermittent every 8 hours      LABS:                          10.5   9.99  )-----------( 174      ( 15 Mar 2022 02:54 )             32.9     03-15    136  |  105  |  72<H>  ----------------------------<  97  4.5   |  16<L>  |  6.36<H>    Ca    8.2<L>      15 Mar 2022 02:54  Phos  8.7     03-15  Mg     2.9     03-15    TPro  6.9  /  Alb  2.0<L>  /  TBili  1.0  /  DBili  x   /  AST  50<H>  /  ALT  16  /  AlkPhos  90  03-15    CAPILLARY BLOOD GLUCOSE      POCT Blood Glucose.: 92 mg/dL (16 Mar 2022 05:53)  POCT Blood Glucose.: 92 mg/dL (15 Mar 2022 23:19)        I&O's Summary    15 Mar 2022 07:01  -  16 Mar 2022 07:00  --------------------------------------------------------  IN: 1393.6 mL / OUT: 1410 mL / NET: -16.4 mL    16 Mar 2022 07:01  -  16 Mar 2022 11:44  --------------------------------------------------------  IN: 141.3 mL / OUT: 0 mL / NET: 141.3 mL      Vital Signs Last 24 Hrs  T(C): 37.7 (16 Mar 2022 04:00), Max: 37.7 (16 Mar 2022 04:00)  T(F): 99.9 (16 Mar 2022 04:00), Max: 99.9 (16 Mar 2022 04:00)  HR: 69 (16 Mar 2022 11:00) (69 - 70)  BP: 107/55 (16 Mar 2022 11:00) (79/48 - 128/63)  BP(mean): 68 (16 Mar 2022 11:00) (56 - 79)  RR: 20 (16 Mar 2022 11:00) (13 - 27)  SpO2: 98% (16 Mar 2022 11:00) (94% - 100%)      NEUROLOGICAL EXAM:    MS: Comatose, unarousable, unresponsive  CN: Pupils 2-3mm bilaterally, poorly reactive, no oculocephalic response, no corneal reflexes bilaterally, no gag reflex, not breathing over ventilator  Motor: normal bulk, normal tone, no spontaneous movements  Sensation: no withdrawal or grimace with noxious stimulation, there is triple flexion responses in the lower extremities which is a spinal cord reflex  Reflexes: none

## 2022-03-16 NOTE — DISCHARGE NOTE FOR THE EXPIRED PATIENT - HOSPITAL COURSE
80 y/o male PMHx ESRD on HD, AV fistula, pacemaker, CVA, HLD, HTN, polycystic kidney disease, afib, b/l JUSTIN, diastolic heart failure c/o nausea and RUQ pain yesterday. Acute cholecystitis with gallstone in the gallbladder neck.  Perc denae by IR on 2/[birth year]82 year old male with h/o HTN, HLD, CVA, afib on eliquis s/p ppm, ESRD ( on dialysisi Tues/Thurs/Sat, pt's last dialysis was on Thurs, pt missed his Sat session ) and cholecystitis/gallstone pancreatitis s/p RUQ perc denae drainage presents today biba from dialysis where pt was found unresponsive. Per EMS, pt was found in v tach.Patient was given a total of one shock in the dialysis center on arrival. Pt was also intubated in the field. Patient continued to be in V tach, They administered three more shocks at 200 Joules. They also gave a total of three bicarb, amiodarone 300mg, 3 epi and one amp of calcium chloride with return of pulses. Patient was noted to be down relatively for 10 minutes. Pt was brought in with good pulses, immediately assessed, tube placement verified with chest auscultation and portable cxr, pt hypotensive given IVf and placed on the ventilatorPatient continued to be in V tach with reported three more shocks at 200 Joules delivered by EMS. On exam pt with noted myoclonic jerking. ICU admitting dx: 1) V tach arrest with lactic acidosis and NSTEMI vs demand ischemia 2) Acute respiratory failure requiring intubation 3) Anoxic encephalopathy. Today pt noted with abnormal vEEG; EEG fellow called and pt's vEEG c/w status epilepticus.spoke to wife today and pt's condition discussed with herhe is Family aware neurologic recovery is poor and wife states that she does not want him to suffer and would not want prolonged ventilation/trach as those would not be his wishes GOC discussion. Family likely to pursue comfort measures with elective extubation. They would not escalate care if pt decompensates DNR. Patient and family at bedside and proceeded with compassionate wean today. Weaned at 1125 this date. At 1230 Called by chucky tp pronounce Mr. Reny bucio. Patient unresponsive to verbal or tactile stimuli, No heart sound heard, no pulse felt. Not breathing, no air entry heard. Pupils fixed and dilated Patient pronounced dead at 1238 hours, march 16, 2022. Family remained at bedside. ICU attending made aware.

## 2022-03-16 NOTE — PROGRESS NOTE ADULT - PROVIDER SPECIALTY LIST ADULT
Nephrology
Critical Care
Nephrology
Critical Care
Nephrology
Neurology
Critical Care

## 2022-03-16 NOTE — PROGRESS NOTE ADULT - ASSESSMENT
81 yo man s/p cardiac arrest with a severe anoxic brain injury.  Head CT reported as demonstrating loss of grey-white matter differentiation suggesting diffuse cerebral edema which is seen after severe cerebral anoxia.  EEG as above now interictal with suppression  Neurological exam demonstrates weak brainstem reflexes.  There is triple flexion responses in the lower extremities with noxious stimulation which is a spinal cord reflex (not conscious withdrawal).    The above findings suggest a very poor prognosis for functional neurological recovery.    -terminal extubation as per family wishes  -No further inpatient Neurologic workup at this time

## 2022-03-29 DIAGNOSIS — G40.901 EPILEPSY, UNSPECIFIED, NOT INTRACTABLE, WITH STATUS EPILEPTICUS: ICD-10-CM

## 2022-03-29 DIAGNOSIS — G93.6 CEREBRAL EDEMA: ICD-10-CM

## 2022-03-29 DIAGNOSIS — Z86.73 PERSONAL HISTORY OF TRANSIENT ISCHEMIC ATTACK (TIA), AND CEREBRAL INFARCTION WITHOUT RESIDUAL DEFICITS: ICD-10-CM

## 2022-03-29 DIAGNOSIS — I48.91 UNSPECIFIED ATRIAL FIBRILLATION: ICD-10-CM

## 2022-03-29 DIAGNOSIS — Z66 DO NOT RESUSCITATE: ICD-10-CM

## 2022-03-29 DIAGNOSIS — Z95.0 PRESENCE OF CARDIAC PACEMAKER: ICD-10-CM

## 2022-03-29 DIAGNOSIS — Z51.5 ENCOUNTER FOR PALLIATIVE CARE: ICD-10-CM

## 2022-03-29 DIAGNOSIS — G93.1 ANOXIC BRAIN DAMAGE, NOT ELSEWHERE CLASSIFIED: ICD-10-CM

## 2022-03-29 DIAGNOSIS — I47.2 VENTRICULAR TACHYCARDIA: ICD-10-CM

## 2022-03-29 DIAGNOSIS — Z99.2 DEPENDENCE ON RENAL DIALYSIS: ICD-10-CM

## 2022-03-29 DIAGNOSIS — Z96.643 PRESENCE OF ARTIFICIAL HIP JOINT, BILATERAL: ICD-10-CM

## 2022-03-29 DIAGNOSIS — Q61.3 POLYCYSTIC KIDNEY, UNSPECIFIED: ICD-10-CM

## 2022-03-29 DIAGNOSIS — Z79.82 LONG TERM (CURRENT) USE OF ASPIRIN: ICD-10-CM

## 2022-03-29 DIAGNOSIS — I50.32 CHRONIC DIASTOLIC (CONGESTIVE) HEART FAILURE: ICD-10-CM

## 2022-03-29 DIAGNOSIS — K85.10 BILIARY ACUTE PANCREATITIS WITHOUT NECROSIS OR INFECTION: ICD-10-CM

## 2022-03-29 DIAGNOSIS — Z79.01 LONG TERM (CURRENT) USE OF ANTICOAGULANTS: ICD-10-CM

## 2022-03-29 DIAGNOSIS — N18.6 END STAGE RENAL DISEASE: ICD-10-CM

## 2022-03-29 DIAGNOSIS — J18.9 PNEUMONIA, UNSPECIFIED ORGANISM: ICD-10-CM

## 2022-03-29 DIAGNOSIS — I13.2 HYPERTENSIVE HEART AND CHRONIC KIDNEY DISEASE WITH HEART FAILURE AND WITH STAGE 5 CHRONIC KIDNEY DISEASE, OR END STAGE RENAL DISEASE: ICD-10-CM

## 2022-03-29 DIAGNOSIS — R40.20 UNSPECIFIED COMA: ICD-10-CM

## 2022-03-29 DIAGNOSIS — I21.4 NON-ST ELEVATION (NSTEMI) MYOCARDIAL INFARCTION: ICD-10-CM

## 2022-03-29 DIAGNOSIS — K81.0 ACUTE CHOLECYSTITIS: ICD-10-CM

## 2022-03-29 DIAGNOSIS — I07.1 RHEUMATIC TRICUSPID INSUFFICIENCY: ICD-10-CM

## 2022-03-29 DIAGNOSIS — I35.0 NONRHEUMATIC AORTIC (VALVE) STENOSIS: ICD-10-CM

## 2022-03-29 DIAGNOSIS — R31.9 HEMATURIA, UNSPECIFIED: ICD-10-CM

## 2022-03-29 DIAGNOSIS — J96.01 ACUTE RESPIRATORY FAILURE WITH HYPOXIA: ICD-10-CM

## 2022-03-29 DIAGNOSIS — E87.6 HYPOKALEMIA: ICD-10-CM

## 2022-03-29 DIAGNOSIS — Z86.74 PERSONAL HISTORY OF SUDDEN CARDIAC ARREST: ICD-10-CM

## 2022-03-29 DIAGNOSIS — E87.2 ACIDOSIS: ICD-10-CM

## 2022-03-29 DIAGNOSIS — E78.5 HYPERLIPIDEMIA, UNSPECIFIED: ICD-10-CM

## 2022-11-06 NOTE — PATIENT PROFILE ADULT - FUNCTIONAL ASSESSMENT - DAILY ACTIVITY SECTION LABEL
[Nutrition/ Exercise/ Weight Management] : nutrition, exercise, weight management [Drugs/Alcohol] : drugs, alcohol [Contraception/ Emergency Contraception/ Safe Sexual Practices] : contraception, emergency contraception, safe sexual practices [STD (testing, results, tx)] : STD (testing, results, tx) .

## 2022-12-16 NOTE — ED PROVIDER NOTE - SKIN, MLM
Cardiac risk stratification faxed to [facility] for colonoscopy.   
Skin normal color for race, warm, dry and intact. No evidence of rash.

## 2023-07-31 NOTE — PROGRESS NOTE ADULT - PROBLEM SELECTOR PLAN 2
c/w home meds
cont w/ same mgmt
cont w/ same mgmt
c/w home meds
Adult

## 2023-12-26 NOTE — DISCHARGE NOTE NURSING/CASE MANAGEMENT/SOCIAL WORK - NSDCPEPTSTRK_GEN_ALL_CORE
Patient reports he tested positive for covid on Friday 12/22 and has had diarrhea for 5 days. Patient denies any blood in stool but states he is going 10 times a day. Denies nausea or vomiting. Reports abdominal cramping before he has to go to restroom but no consistent abdominal pain.     Triage Assessment (Adult)       Row Name 12/26/23 0903          Triage Assessment    Airway WDL WDL        Respiratory WDL    Respiratory WDL WDL        Skin Circulation/Temperature WDL    Skin Circulation/Temperature WDL WDL        Cardiac WDL    Cardiac WDL WDL        Peripheral/Neurovascular WDL    Peripheral Neurovascular WDL WDL        Cognitive/Neuro/Behavioral WDL    Cognitive/Neuro/Behavioral WDL WDL                     
Stroke warning signs and symptoms/Stroke education booklet/Stroke support groups for patients, families, and friends/Call 911 for stroke/Risk factors for stroke/Prescribed medications/Signs and symptoms of stroke/Need for follow up after discharge

## 2024-01-04 NOTE — ED ADULT NURSE NOTE - CHIEF COMPLAINT
The patient is a 81y Male complaining of weakness. History, exam, labs and imaging reviewed. I recommend oral antibiotics given leukocytosis and increased drainage from setons, follow up with CRS and GI establishment of care for medication to manage his severe crohns disease

## 2024-01-14 NOTE — DIETITIAN INITIAL EVALUATION ADULT. - FEEDING SKILL
Pt presents with increased SOB and cough along with HA. Pt was diagnosed with a lung infection this past week and has been on 4 days of abx. Pt has hx of asthma. Audible wheezing. Attempted neb at home-coughed up yellow sputum. Pt was 90% after ambulation to triage. Recovered to 96%. Dyspnea on exertion.      Triage Assessment (Adult)       Row Name 01/13/24 2042          Triage Assessment    Airway WDL WDL        Respiratory WDL    Respiratory WDL X;rhythm/pattern;cough     Rhythm/Pattern, Respiratory dyspnea upon exertion     Cough Frequency infrequent     Cough Type productive        Skin Circulation/Temperature WDL    Skin Circulation/Temperature WDL WDL        Cardiac WDL    Cardiac WDL WDL        Peripheral/Neurovascular WDL    Peripheral Neurovascular WDL WDL        Cognitive/Neuro/Behavioral WDL    Cognitive/Neuro/Behavioral WDL WDL                     
total assistance

## 2024-02-09 NOTE — ED ADULT NURSE NOTE - PAIN: PRESENCE, MLM
Complex abdominal wall surgery Progress Note      SUBJECTIVE    Pt S+E at bedside. Pain not well controlled this am. Is tearful. No ostomy output.    Physical Exam    Vital Signs   Last Value 24 Hour Range   Temperature 98.4 °F (36.9 °C) (02/09/24 0802) Temp  Min: 97.5 °F (36.4 °C)  Max: 98.8 °F (37.1 °C)   Pulse 62 (02/09/24 0802) Pulse  Min: 61  Max: 86   Respiratory 18 (02/09/24 0500) Resp  Min: 13  Max: 24   Blood Pressure 106/69 (02/09/24 0802) BP  Min: 101/57  Max: 146/80   Pulse Oximetry 96 % (02/09/24 0802) SpO2  Min: 93 %  Max: 100 %   CVP   No data recorded     General: Alert and oriented ×3,  no acute distressed.   Cardiovascular:  Normal heart rate. Normal rhythm.  Respiratory:  Normal breath sounds. No respiratory distress. No wheezing.   Abdomen:  Abd soft, appropriately TTP, prevena intact. Plastics ARCADIO is serosang. Ostomy pink with sweat in the bag.  Extremities:  No edema      Intake & Output    This Shift 24 Hour Range   No intake/output data recorded. I/O last 3 completed shifts:  In: 2200 [I.V.:2200]  Out: 1485 [Urine:1180; Drains:305]     Labs    Recent Labs   Lab 02/09/24  0438   WBC 9.3   HCT 32.3*   HGB 10.7*      SODIUM 136   POTASSIUM 3.9   CHLORIDE 108   CO2 23   CALCIUM 8.5   GLUCOSE 123*   BUN 19   CREATININE 0.73       Assessment    S/p APR, ileostomy reversal, perineal wound debridement, ALEKS-BSO & posterior vaginectomy (Dr. Bowen), VRAM flap (Dr. Oquendo), incisional hernia repair with Strattice mesh (Dr. Amaya), bilateral ureteral stents (Dr. Castillo) on 2/8/24    Plan    -Prevena intact, plan to remove on POD#7  -Plastics managing ARCADIO drain  -Remaining management per colorectal surgery    Rufina Amaya, DO  Complex Abdominal Wall Surgery       denies pain/discomfort

## 2024-04-26 NOTE — ED ADULT TRIAGE NOTE - MODE OF ARRIVAL
Ambulance Normal vision: sees adequately in most situations; can see medication labels, newsprint EMS

## 2024-06-20 NOTE — OCCUPATIONAL THERAPY INITIAL EVALUATION ADULT - PERSONAL SAFETY AND JUDGMENT, REHAB EVAL
LVM To call back to schedule a Preop Px  Ira Contreras  [facility] Unit Coordinator     requires verbal cues

## 2025-01-06 NOTE — PATIENT PROFILE ADULT - NSPROMUTINFOINDIVIDFT_GEN_A_NUR
Exacerbation of ulcerative colitis Exacerbation of ulcerative colitis Exacerbation of ulcerative colitis n/a

## 2025-06-20 NOTE — CHART NOTE - NSCHARTNOTEFT_GEN_A_CORE
HPI    79 years old man was reported to be last known well and around NOON when he was shopping with his wife at [employer]. He was reported to have a normal conversation with his wife at that time. Soon after that, he was reported to have language disturbance in the form of inability to answer any questions or follow any commands. He was brought to ER  further evaluation. Family denies that he had  any episodes of focal neurological symptoms in the past even transient. Patient seen and examined in ER a discission to give tpa ws made with neuro EICU . After TPA completed mri / mra done 2/2 elevated creatine. MR head - Small completed subacute left-sided infarctas above. 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 Moderate stenosis involving the distal left vertebral artery intracranial V4 segment. No large vessel occlusion. Patient admitted to ICU for observation. Neurotology consulted Dr. Patricio on case. Patient speech improved remains with mild expressive aphasia.     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 No micro hemorrhage  cannot be excluded, there is no obvious hematoma. No midline shift. 2)  the infarct is somewhat more prominent, when compared with the prior  MR dated 5/16/2019. < end of copied text >    Patient started on antihypertensive, to maintain sbp around 140's for now. Started on aspirin and Zocor. 2decho done.  Patient seen and examined by ICU attending and stable for transfer to medicine service. HPI    79 years old man was reported to be last known well and around NO[place] when he was shopping with his wife at [employer]. He was reported to have a normal conversation with his wife at that time. Soon after that, he was reported to have language disturbance in the form of inability to answer any questions or follow any commands. He was brought to ER  further evaluation. Family denies that he had  any episodes of focal neurological symptoms in the past even transient. Patient seen and examined in ER a discission to give tpa ws made with neuro EICU . After TPA completed mri / mra done 2/2 elevated creatine. MR head - Small completed subacute left-sided infarctas above. 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 Moderate stenosis involving the distal left vertebral artery intracranial V4 segment. No large vessel occlusion. Patient admitted to ICU for observation. Neurotology consulted Dr. Patricio on case. Patient speech improved remains with mild expressive aphasia.     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 No micro hemorrhage  cannot be excluded, there is no obvious hematoma. No midline shift. 2)  the infarct is somewhat more prominent, when compared with the prior  MR dated 5/16/2019. < end of copied text >    Patient started on antihypertensive, to maintain sbp around 140's for now. Started on aspirin and Zocor. 2decho done.  Patient seen and examined by ICU attending and stable for transfer to medicine service.    Report given to Dr. Miranda, Hospitalist service     Neeta Parks, KAYLI-C  critical care Treatment Goal Met?: no Treatment Goal Explanation (Does Not Render In The Note): Stable for the purposes of categorizing medical decision making is defined by the specific treatment goals for an individual patient. A patient that is not at their treatment goal is not stable, even if the condition has not changed and there is no short- term threat to life or function.